# Patient Record
Sex: FEMALE | Race: WHITE | Employment: UNEMPLOYED | URBAN - METROPOLITAN AREA
[De-identification: names, ages, dates, MRNs, and addresses within clinical notes are randomized per-mention and may not be internally consistent; named-entity substitution may affect disease eponyms.]

---

## 2018-03-01 LAB
ALBUMIN SERPL-MCNC: NORMAL G/DL
ALP BLD-CCNC: NORMAL U/L
ALT SERPL-CCNC: NORMAL U/L
ANION GAP SERPL CALCULATED.3IONS-SCNC: NORMAL MMOL/L
AST SERPL-CCNC: NORMAL U/L
AVERAGE GLUCOSE: NORMAL
BILIRUB SERPL-MCNC: NORMAL MG/DL
BUN BLDV-MCNC: NORMAL MG/DL
CALCIUM SERPL-MCNC: NORMAL MG/DL
CHLORIDE BLD-SCNC: NORMAL MMOL/L
CHOLESTEROL, TOTAL: NORMAL
CHOLESTEROL/HDL RATIO: NORMAL
CO2: NORMAL
CREAT SERPL-MCNC: NORMAL MG/DL
GFR CALCULATED: NORMAL
GLUCOSE BLD-MCNC: NORMAL MG/DL
HBA1C MFR BLD: 5 %
HDLC SERPL-MCNC: NORMAL MG/DL
LDL CHOLESTEROL CALCULATED: NORMAL
POTASSIUM SERPL-SCNC: NORMAL MMOL/L
SODIUM BLD-SCNC: NORMAL MMOL/L
TOTAL PROTEIN: NORMAL
TRIGL SERPL-MCNC: NORMAL MG/DL
VLDLC SERPL CALC-MCNC: NORMAL MG/DL

## 2018-08-04 ENCOUNTER — HOSPITAL ENCOUNTER (EMERGENCY)
Age: 58
Discharge: HOME | End: 2018-08-04
Payer: COMMERCIAL

## 2018-08-04 VITALS
TEMPERATURE: 98 F | HEART RATE: 100 BPM | HEIGHT: 67 IN | WEIGHT: 140 LBS | OXYGEN SATURATION: 95 % | SYSTOLIC BLOOD PRESSURE: 182 MMHG | RESPIRATION RATE: 16 BRPM | DIASTOLIC BLOOD PRESSURE: 94 MMHG | BODY MASS INDEX: 21.97 KG/M2

## 2018-08-04 DIAGNOSIS — K13.70 ORAL MUCOSAL LESION: ICD-10-CM

## 2018-08-04 DIAGNOSIS — R21 RASH: Primary | ICD-10-CM

## 2018-08-04 PROCEDURE — 99282 EMERGENCY DEPT VISIT SF MDM: CPT

## 2018-08-04 PROCEDURE — 6370000000 HC RX 637 (ALT 250 FOR IP): Performed by: PHYSICIAN ASSISTANT

## 2018-08-04 RX ORDER — PERMETHRIN 50 MG/G
CREAM TOPICAL
Qty: 60 G | Refills: 1 | Status: SHIPPED | OUTPATIENT
Start: 2018-08-04 | End: 2021-09-03

## 2018-08-04 RX ORDER — LORATADINE 10 MG/1
10 TABLET ORAL DAILY
Qty: 10 TABLET | Refills: 0 | Status: SHIPPED | OUTPATIENT
Start: 2018-08-04 | End: 2018-08-14

## 2018-08-04 RX ORDER — PREDNISONE 10 MG/1
40 TABLET ORAL DAILY
Qty: 16 TABLET | Refills: 0 | Status: SHIPPED | OUTPATIENT
Start: 2018-08-05 | End: 2018-08-09

## 2018-08-04 RX ORDER — PREDNISONE 20 MG/1
60 TABLET ORAL ONCE
Status: COMPLETED | OUTPATIENT
Start: 2018-08-04 | End: 2018-08-04

## 2018-08-04 RX ORDER — DIPHENHYDRAMINE HCL 25 MG
25 TABLET ORAL ONCE
Status: COMPLETED | OUTPATIENT
Start: 2018-08-04 | End: 2018-08-04

## 2018-08-04 RX ADMIN — PREDNISONE 60 MG: 20 TABLET ORAL at 11:30

## 2018-08-04 RX ADMIN — DIPHENHYDRAMINE HCL 25 MG: 25 TABLET ORAL at 11:30

## 2018-08-04 NOTE — ED NOTES
Pt verbalizes understanding of discharge instructions and states she will follow up with her pcp-dr eduardo.      Adán Lopez RN  08/04/18 8036

## 2018-08-04 NOTE — ED PROVIDER NOTES
[facility]     Department of Emergency Medicine   ED  Provider Note  Admit Date/RoomTime: 8/4/2018 10:47 AM  ED Room: 10/10  Chief Complaint   Rash (abdomen, back and upper thighs onset a couple weeks ago and worse over past few days, dog recently diagnosed with mange-sarcoptic)    History of Present Illness   Source of history provided by:  patient. History/Exam Limitations: none. Reginaldo Amaya is a 59 y.o. old female with has a past medical history of:   Past Medical History:   Diagnosis Date    Anxiety     Depression     ETOH abuse     vodka    Hypertension     presents to the emergency department by private vehicle, for complaint of still present red and itchy area on  Back, abdomen, and legs which began several week(s) prior to arrival. States her dog was just diagnosed with scabies. Pt notes she lives at home alone and so there are no other people with similar symptoms. She denies any recent travel. The symptoms were caused by unknown cause. Since onset the symptoms have been persistent. Prior history of similar episodes: No.   Her symptoms are associated with rash and relieved by nothing. She denies any fever, chill, pain at rash site, change in laundry detergent or body wash. ROS    Pertinent positives and negatives are stated within HPI, all other systems reviewed and are negative. Past Surgical History:   Procedure Laterality Date    WRIST SURGERY     Social History:  reports that she has quit smoking. She has never used smokeless tobacco. She reports that she drinks alcohol. She reports that she does not use drugs. Family History: family history is not on file. Allergies: Patient has no known allergies.     Physical Exam           ED Triage Vitals   BP Temp Temp src Pulse Resp SpO2 Height Weight   08/04/18 1050 08/04/18 1050 -- 08/04/18 1050 08/04/18 1050 08/04/18 1050 08/04/18 1103 08/04/18 1103   (!) 182/94 98 °F (36.7 °C)  100 16 95 % 5' 7\" (1.702 m) 140 lb (63.5 kg)

## 2019-05-15 ENCOUNTER — TELEPHONE (OUTPATIENT)
Dept: PRIMARY CARE CLINIC | Age: 59
End: 2019-05-15

## 2019-05-15 RX ORDER — OMEPRAZOLE 40 MG/1
40 CAPSULE, DELAYED RELEASE ORAL DAILY
Qty: 30 CAPSULE | Refills: 0 | Status: SHIPPED | OUTPATIENT
Start: 2019-05-15 | End: 2019-07-17 | Stop reason: SDUPTHER

## 2019-05-17 RX ORDER — LISINOPRIL AND HYDROCHLOROTHIAZIDE 20; 12.5 MG/1; MG/1
1 TABLET ORAL DAILY
Qty: 30 TABLET | Refills: 0 | Status: SHIPPED | OUTPATIENT
Start: 2019-05-17 | End: 2019-07-17 | Stop reason: SDUPTHER

## 2019-07-17 RX ORDER — LISINOPRIL AND HYDROCHLOROTHIAZIDE 20; 12.5 MG/1; MG/1
1 TABLET ORAL DAILY
Qty: 30 TABLET | Refills: 1 | Status: SHIPPED | OUTPATIENT
Start: 2019-07-17 | End: 2019-11-19 | Stop reason: SDUPTHER

## 2019-07-17 RX ORDER — OMEPRAZOLE 40 MG/1
40 CAPSULE, DELAYED RELEASE ORAL DAILY
Qty: 30 CAPSULE | Refills: 1 | Status: SHIPPED | OUTPATIENT
Start: 2019-07-17 | End: 2019-11-19 | Stop reason: SDUPTHER

## 2019-11-19 RX ORDER — LISINOPRIL AND HYDROCHLOROTHIAZIDE 20; 12.5 MG/1; MG/1
1 TABLET ORAL DAILY
Qty: 30 TABLET | Refills: 0 | Status: SHIPPED | OUTPATIENT
Start: 2019-11-19 | End: 2020-01-06 | Stop reason: SDUPTHER

## 2019-11-19 RX ORDER — OMEPRAZOLE 40 MG/1
40 CAPSULE, DELAYED RELEASE ORAL DAILY
Qty: 30 CAPSULE | Refills: 0 | Status: SHIPPED
Start: 2019-11-19 | End: 2021-09-03

## 2020-01-06 RX ORDER — LISINOPRIL AND HYDROCHLOROTHIAZIDE 20; 12.5 MG/1; MG/1
1 TABLET ORAL DAILY
Qty: 30 TABLET | Refills: 0 | Status: SHIPPED
Start: 2020-01-06 | End: 2020-02-27 | Stop reason: SDUPTHER

## 2020-01-21 RX ORDER — TRAMADOL HYDROCHLORIDE 50 MG/1
TABLET ORAL
COMMUNITY
End: 2021-09-03

## 2020-01-21 RX ORDER — ESCITALOPRAM OXALATE 5 MG/1
TABLET ORAL
COMMUNITY
Start: 2018-03-01 | End: 2021-09-03

## 2020-02-27 RX ORDER — LISINOPRIL AND HYDROCHLOROTHIAZIDE 20; 12.5 MG/1; MG/1
1 TABLET ORAL DAILY
Qty: 30 TABLET | Refills: 1 | Status: SHIPPED
Start: 2020-02-27 | End: 2020-05-15 | Stop reason: SDUPTHER

## 2020-05-15 RX ORDER — LISINOPRIL AND HYDROCHLOROTHIAZIDE 20; 12.5 MG/1; MG/1
1 TABLET ORAL DAILY
Qty: 30 TABLET | Refills: 0 | Status: SHIPPED
Start: 2020-05-15 | End: 2020-06-30 | Stop reason: SDUPTHER

## 2020-05-15 RX ORDER — LISINOPRIL AND HYDROCHLOROTHIAZIDE 20; 12.5 MG/1; MG/1
1 TABLET ORAL DAILY
Qty: 30 TABLET | Refills: 1 | OUTPATIENT
Start: 2020-05-15

## 2020-06-26 RX ORDER — LISINOPRIL AND HYDROCHLOROTHIAZIDE 20; 12.5 MG/1; MG/1
1 TABLET ORAL DAILY
Qty: 30 TABLET | Refills: 0 | OUTPATIENT
Start: 2020-06-26

## 2020-06-30 RX ORDER — LISINOPRIL AND HYDROCHLOROTHIAZIDE 20; 12.5 MG/1; MG/1
1 TABLET ORAL DAILY
Qty: 30 TABLET | Refills: 5 | Status: SHIPPED
Start: 2020-06-30 | End: 2021-02-25 | Stop reason: SDUPTHER

## 2020-06-30 NOTE — TELEPHONE ENCOUNTER
Last Appointment:  Visit date not found  Future Appointments   Date Time Provider Irma Anne   7/2/2020  9:15 AM Coty Amaya  [address]

## 2021-02-25 RX ORDER — LISINOPRIL AND HYDROCHLOROTHIAZIDE 20; 12.5 MG/1; MG/1
1 TABLET ORAL DAILY
Qty: 30 TABLET | Refills: 1 | Status: SHIPPED
Start: 2021-02-25 | End: 2021-06-01 | Stop reason: SDUPTHER

## 2021-02-25 NOTE — TELEPHONE ENCOUNTER
Last Appointment:  5/26/2020  Future Appointments   Date Time Provider Irma Anne   4/9/2021  3:30 PM MD SAIRA Bosch [facility]      Pharmacy is requesting a refill. I called and scheduled a f/u for April in [place]. Stressed the importance of keeping the appt.

## 2021-06-01 RX ORDER — LISINOPRIL AND HYDROCHLOROTHIAZIDE 20; 12.5 MG/1; MG/1
1 TABLET ORAL DAILY
Qty: 30 TABLET | Refills: 1 | Status: SHIPPED
Start: 2021-06-01 | End: 2021-09-01 | Stop reason: SDUPTHER

## 2021-06-01 NOTE — TELEPHONE ENCOUNTER
El [facility] is requesting a refill on Lisinopril Hctz. Pt has not been seen in over two years. I called her in Feb and stressed the importance of keeping her April appt. However, she has cancelled her April appt and 2 appts that she made in May. I called and spoke w/ Tanvi Crow, she said that she relies on others for transportation and it is difficult for her to get a ride. I explained that we will send in a 2 month supply but if she fails to be seen before that supply runs out we will not be able to send more refills until she comes into the office.      Last Appointment:  Visit date not found  Future Appointments   Date Time Provider Irma Anne   7/16/2021  2:30 PM Ilsa Landau, MD [facility]

## 2021-09-01 RX ORDER — LISINOPRIL AND HYDROCHLOROTHIAZIDE 20; 12.5 MG/1; MG/1
1 TABLET ORAL DAILY
Qty: 30 TABLET | Refills: 1 | Status: SHIPPED
Start: 2021-09-01 | End: 2021-09-03 | Stop reason: SDUPTHER

## 2021-09-03 ENCOUNTER — OFFICE VISIT (OUTPATIENT)
Dept: PRIMARY CARE CLINIC | Age: 61
End: 2021-09-03
Payer: MEDICARE

## 2021-09-03 VITALS
SYSTOLIC BLOOD PRESSURE: 122 MMHG | BODY MASS INDEX: 21.56 KG/M2 | OXYGEN SATURATION: 96 % | HEART RATE: 108 BPM | DIASTOLIC BLOOD PRESSURE: 70 MMHG | TEMPERATURE: 97.3 F | WEIGHT: 137.4 LBS | HEIGHT: 67 IN

## 2021-09-03 DIAGNOSIS — K21.9 GASTROESOPHAGEAL REFLUX DISEASE WITHOUT ESOPHAGITIS: ICD-10-CM

## 2021-09-03 DIAGNOSIS — Z12.11 SCREENING FOR MALIGNANT NEOPLASM OF COLON: ICD-10-CM

## 2021-09-03 DIAGNOSIS — N87.9 CERVICAL DYSPLASIA: ICD-10-CM

## 2021-09-03 DIAGNOSIS — Z82.49 FAMILY HX OF ISCHEM HEART DIS AND OTH DIS OF THE CIRC SYS: ICD-10-CM

## 2021-09-03 DIAGNOSIS — R53.83 OTHER FATIGUE: ICD-10-CM

## 2021-09-03 DIAGNOSIS — F10.11 H/O ALCOHOL ABUSE: ICD-10-CM

## 2021-09-03 DIAGNOSIS — I10 ESSENTIAL HYPERTENSION: Primary | ICD-10-CM

## 2021-09-03 DIAGNOSIS — R73.9 HYPERGLYCEMIA: ICD-10-CM

## 2021-09-03 DIAGNOSIS — Z12.31 ENCOUNTER FOR SCREENING MAMMOGRAM FOR MALIGNANT NEOPLASM OF BREAST: ICD-10-CM

## 2021-09-03 DIAGNOSIS — F41.1 GENERALIZED ANXIETY DISORDER: ICD-10-CM

## 2021-09-03 PROCEDURE — G8420 CALC BMI NORM PARAMETERS: HCPCS | Performed by: INTERNAL MEDICINE

## 2021-09-03 PROCEDURE — 99214 OFFICE O/P EST MOD 30 MIN: CPT | Performed by: INTERNAL MEDICINE

## 2021-09-03 PROCEDURE — 3017F COLORECTAL CA SCREEN DOC REV: CPT | Performed by: INTERNAL MEDICINE

## 2021-09-03 PROCEDURE — G8427 DOCREV CUR MEDS BY ELIG CLIN: HCPCS | Performed by: INTERNAL MEDICINE

## 2021-09-03 PROCEDURE — 4004F PT TOBACCO SCREEN RCVD TLK: CPT | Performed by: INTERNAL MEDICINE

## 2021-09-03 RX ORDER — ESCITALOPRAM OXALATE 10 MG/1
10 TABLET ORAL DAILY
Qty: 30 TABLET | Refills: 5 | Status: SHIPPED
Start: 2021-09-03 | End: 2022-03-22 | Stop reason: SDUPTHER

## 2021-09-03 RX ORDER — LISINOPRIL AND HYDROCHLOROTHIAZIDE 20; 12.5 MG/1; MG/1
1 TABLET ORAL DAILY
Qty: 30 TABLET | Refills: 5 | Status: SHIPPED
Start: 2021-09-03 | End: 2022-03-24 | Stop reason: SDUPTHER

## 2021-09-03 SDOH — ECONOMIC STABILITY: FOOD INSECURITY: WITHIN THE PAST 12 MONTHS, THE FOOD YOU BOUGHT JUST DIDN'T LAST AND YOU DIDN'T HAVE MONEY TO GET MORE.: NEVER TRUE

## 2021-09-03 SDOH — ECONOMIC STABILITY: FOOD INSECURITY: WITHIN THE PAST 12 MONTHS, YOU WORRIED THAT YOUR FOOD WOULD RUN OUT BEFORE YOU GOT MONEY TO BUY MORE.: NEVER TRUE

## 2021-09-03 ASSESSMENT — ENCOUNTER SYMPTOMS
NAUSEA: 0
RHINORRHEA: 0
SORE THROAT: 0
VOMITING: 0
CHEST TIGHTNESS: 0
COUGH: 0
ABDOMINAL PAIN: 0
BLOOD IN STOOL: 0
CONSTIPATION: 0
EYE PAIN: 0
DIARRHEA: 0
SHORTNESS OF BREATH: 0

## 2021-09-03 ASSESSMENT — PATIENT HEALTH QUESTIONNAIRE - PHQ9
SUM OF ALL RESPONSES TO PHQ QUESTIONS 1-9: 0
2. FEELING DOWN, DEPRESSED OR HOPELESS: 0
1. LITTLE INTEREST OR PLEASURE IN DOING THINGS: 0
SUM OF ALL RESPONSES TO PHQ9 QUESTIONS 1 & 2: 0
SUM OF ALL RESPONSES TO PHQ QUESTIONS 1-9: 0
SUM OF ALL RESPONSES TO PHQ QUESTIONS 1-9: 0

## 2021-09-03 ASSESSMENT — SOCIAL DETERMINANTS OF HEALTH (SDOH): HOW HARD IS IT FOR YOU TO PAY FOR THE VERY BASICS LIKE FOOD, HOUSING, MEDICAL CARE, AND HEATING?: NOT HARD AT ALL

## 2021-09-03 NOTE — PROGRESS NOTES
[facility]) Physicians   Internal Medicine     9/3/2021  Mauricio Amaya : 1960 Sex: female  Age:59 y.o. Chief Complaint   Patient presents with    Medication Refill     re-establish        HPI:   Patient presents to office for evaluation of the following medical concerns. -  has anxiety and Depression. States has been worse recently. States having panic attacks. States was treated with fluoxetine, duloxetine and lexapro in the past. Discussed psychology and psychiatry, declines at present. No suicidal thoughts.      - States has Hypertension. Not checking blood pressure at home. On lisinopri/hctz and metoprolol. No reporte side effects.     - Gastroesophageal Reflux Disease (GERD). States tried to watch diet. Off PPI. States treating with Maalox prn.     - States has h/o alcohol abuse. States has been to recovery in the past. States drinking 1-2 glasses of wine per night. Advised to stop alcohol.     - h/o Cervical Dysplasia    Health Maintenance   - immunizations:   Influenza Vaccination - declines  Pneumonia Vaccination  Zoster/Shingles Vaccine  Tetanus Vaccination  covid - (3/6/2021) #1, (4/3/2021) #2 - moderna    - Screenings:   Bone Density Scan   Pelvic/Pap Exam  Mammogram     Colonoscopy - () - hemorrhoids, sessile polyp - repeat 5yrs    ROS:  Review of Systems   Constitutional: Negative for appetite change, chills, fever and unexpected weight change. HENT: Negative for congestion, rhinorrhea and sore throat. Eyes: Negative for pain and visual disturbance. Respiratory: Negative for cough, chest tightness and shortness of breath. Cardiovascular: Negative for chest pain and palpitations. Gastrointestinal: Negative for abdominal pain, blood in stool, constipation, diarrhea, nausea and vomiting. Genitourinary: Negative for difficulty urinating, dysuria, frequency, pelvic pain, urgency and vaginal bleeding. Musculoskeletal: Negative for arthralgias and myalgias.    Skin: Negative for rash. Neurological: Negative for dizziness, syncope, weakness, light-headedness, numbness and headaches. Hematological: Negative for adenopathy. Psychiatric/Behavioral: Negative for suicidal ideas. The patient is nervous/anxious. Current Outpatient Medications:     escitalopram (LEXAPRO) 10 MG tablet, Take 1 tablet by mouth daily, Disp: 30 tablet, Rfl: 5    lisinopril-hydroCHLOROthiazide (ZESTORETIC) 20-12.5 MG per tablet, Take 1 tablet by mouth daily, Disp: 30 tablet, Rfl: 5    metoprolol tartrate (LOPRESSOR) 25 MG tablet, Take 1 tablet by mouth 2 times daily, Disp: 60 tablet, Rfl: 5    No Known Allergies    Past Medical History:   Diagnosis Date    Anxiety     Cervical dysplasia     Depression     ETOH abuse     vodka    GERD (gastroesophageal reflux disease)     Hypertension        Past Surgical History:   Procedure Laterality Date    BREAST CYST EXCISION      COLONOSCOPY  10/17/2014    repeat 5 years [place] - hemorrhoids sessile polyp    WRIST SURGERY         Family History   Problem Relation Age of Onset    Arthritis Mother     Cancer Father         colon?     High Blood Pressure Father     Hypertension Brother        Social History     Socioeconomic History    Marital status:      Spouse name: None    Number of children: None    Years of education: None    Highest education level: None   Occupational History    None   Tobacco Use    Smoking status: Former Smoker    Smokeless tobacco: Never Used   Substance and Sexual Activity    Alcohol use: Not Currently     Comment: In recovery Since May - went to [facility]    Drug use: No    Sexual activity: None   Other Topics Concern    None   Social History Narrative    None     Social Determinants of Health     Financial Resource Strain: Low Risk     Difficulty of Paying Living Expenses: Not hard at all   Food Insecurity: No Food Insecurity    Worried About [address] in the Last Year: Never true    Jessica of Food in the Last Year: Never true   Transportation Needs:     Lack of Transportation (Medical):  Lack of Transportation (Non-Medical):    Physical Activity:     Days of Exercise per Week:     Minutes of Exercise per Session:    Stress:     Feeling of Stress :    Social Connections:     Frequency of Communication with Friends and Family:     Frequency of Social Gatherings with Friends and Family:     Attends Voodoo Services:     Active Member of Clubs or Organizations:     Attends Club or Organization Meetings:     Marital Status:    Intimate Partner Violence:     Fear of Current or Ex-Partner:     Emotionally Abused:     Physically Abused:     Sexually Abused:         Vitals:    09/03/21 1530   BP: 122/70   Site: Right Upper Arm   Position: Sitting   Pulse: 108   Temp: 97.3 °F (36.3 °C)   TempSrc: Temporal   SpO2: 96%   Weight: 137 lb 6.4 oz (62.3 kg)   Height: 5' 7\" (1.702 m)       Exam:  Physical Exam  Vitals reviewed. Constitutional:       Appearance: She is well-developed. HENT:      Head: Normocephalic and atraumatic. Right Ear: External ear normal.      Left Ear: External ear normal.   Eyes:      Pupils: Pupils are equal, round, and reactive to light. Neck:      Thyroid: No thyromegaly. Cardiovascular:      Rate and Rhythm: Normal rate and regular rhythm. Heart sounds: Normal heart sounds. No murmur heard. Pulmonary:      Effort: Pulmonary effort is normal.      Breath sounds: Normal breath sounds. No wheezing. Abdominal:      General: Bowel sounds are normal. There is no distension. Palpations: Abdomen is soft. Tenderness: There is no abdominal tenderness. There is no guarding or rebound. Musculoskeletal:         General: Normal range of motion. Cervical back: Normal range of motion and neck supple. Lymphadenopathy:      Cervical: No cervical adenopathy. Skin:     General: Skin is warm and dry.    Neurological:      Mental Status: She is alert and oriented to person, place, and time. Cranial Nerves: No cranial nerve deficit. Psychiatric:         Judgment: Judgment normal.         Assessment and Plan:    Diagnoses and all orders for this visit:    Essential hypertension  - watch diet   - monitor blood pressure at home   - on lisinopril/hctz   - on metrololol   - stable today (9/2021)     Generalized anxiety disorder  - was previously treated with duloxetine, fluoxetine and lexapro  - off meds at present   - worsening symptms   - no suiicdal thoughst   - declines psychology and psychiatry   - restart lexapro     Gastroesophageal reflux disease without esophagitis  - watch doet   - off mediations   - on prn maalox   - refer to GI     H/O alcohol abuse  - was previously [facility]ate [facility]   - advised to abstain from alcohol     Cervical dysplasia  - advise gyn eval     Screening for malignant neoplasm of colon  - refer for updated colonoscopy     Encounter for screening mammogram for malignant neoplasm of breast  - order mammogram        Return in about 3 months (around 12/3/2021) for check up and review.     Orders Placed This Encounter   Procedures    [facility]AN DIGITAL SCREEN BILATERAL     PER [address] PROTOCOL     Standing Status:   Future     Standing Expiration Date:   11/3/2022     Order Specific Question:   Reason for exam:     Answer:   screening mammogram    Comprehensive Metabolic Panel     Standing Status:   Future     Standing Expiration Date:   9/3/2022    Magnesium     Standing Status:   Future     Standing Expiration Date:   9/3/2022    Lipid, Fasting     Standing Status:   Future     Standing Expiration Date:   9/3/2022    Hemoglobin A1C     Standing Status:   Future     Standing Expiration Date:   9/3/2022    TSH without Reflex     Standing Status:   Future     Standing Expiration Date:   9/3/2022    Urinalysis     Standing Status:   Future     Standing Expiration Date:   9/3/2022    CBC Auto Differential     Standing Status:   Future     Standing Expiration Date:   9/3/2022   Jocelin Rankin MD, Gastroenterology, [place] [facility]     Referral Priority:   Routine     Referral Type:   Eval and Treat     Referral Reason:   Specialty Services Required     Referred to Provider:   Kathleen Paula MD     Requested Specialty:   Gastroenterology     Number of Visits Requested:   1       Requested Prescriptions     Signed Prescriptions Disp Refills    escitalopram (LEXAPRO) 10 MG tablet 30 tablet 5     Sig: Take 1 tablet by mouth daily    lisinopril-hydroCHLOROthiazide (ZESTORETIC) 20-12.5 MG per tablet 30 tablet 5     Sig: Take 1 tablet by mouth daily    metoprolol tartrate (LOPRESSOR) 25 MG tablet 60 tablet 5     Sig: Take 1 tablet by mouth 2 times daily        Sara Ahumada, MD  9/3/2021  4:20 PM

## 2021-09-08 ENCOUNTER — TELEPHONE (OUTPATIENT)
Dept: GASTROENTEROLOGY | Age: 61
End: 2021-09-08

## 2021-09-08 NOTE — TELEPHONE ENCOUNTER
Called and spoke with the patient on the phone. Scheduled her on 10/13/21 at [address] [place] [address] office. Bring ID, medication list, insurance card and co-pay. Gave the directions to the office. The patient verbalized understanding.   Electronically signed by Martin Bob on 9/8/2021 at 11:17 AM

## 2021-10-14 ENCOUNTER — TELEPHONE (OUTPATIENT)
Dept: GASTROENTEROLOGY | Age: 61
End: 2021-10-14

## 2021-10-14 NOTE — LETTER
[address] [address]  Phone: [phone]  Fax: [phone]    Arelis Watters MD        October 14, 2021     Niharika Amaya  [address]      Dear Justin Puri:    We are sorry that you missed your appointment with Dr. Ruben Perdue on 10/13/2021. Your health and follow-up medical care are important to us. Please call our office as soon as possible so that we may reschedule your appointment. If you have already rescheduled your appointment, please disregard this letter.     Sincerely,        An Terry CMA

## 2021-10-14 NOTE — TELEPHONE ENCOUNTER
Tried to reach patient regarding missed appt on 10/13/2021. LVM for patient to call back at their earliest convenience to be rescheduled. No show letter sent.   Electronically signed by Aldean Ahumada, MA on 10/14/2021 at 1:12 PM

## 2021-10-19 ENCOUNTER — HOSPITAL ENCOUNTER (OUTPATIENT)
Dept: MAMMOGRAPHY | Age: 61
Discharge: HOME | End: 2021-10-21
Payer: MEDICARE

## 2021-10-19 DIAGNOSIS — Z12.31 ENCOUNTER FOR SCREENING MAMMOGRAM FOR MALIGNANT NEOPLASM OF BREAST: ICD-10-CM

## 2022-01-25 ENCOUNTER — TELEPHONE (OUTPATIENT)
Dept: FAMILY MEDICINE CLINIC | Age: 62
End: 2022-01-25

## 2022-01-25 DIAGNOSIS — S42.212S: Primary | ICD-10-CM

## 2022-01-25 NOTE — TELEPHONE ENCOUNTER
Orders Placed This Encounter   Procedures    Sumanth Storey MD, Orthopaedics, [place] ([facility])     Referral Priority:   Routine     Referral Type:   Eval and Treat     Referral Reason:   Specialty Services Required     Referred to Provider:   Tutu Wright MD     Requested Specialty:   Orthopedic Surgery     Number of Visits Requested:   1     Referral placed

## 2022-01-31 DIAGNOSIS — S42.216S: Primary | ICD-10-CM

## 2022-01-31 RX ORDER — HYDROCODONE BITARTRATE AND ACETAMINOPHEN 5; 325 MG/1; MG/1
TABLET ORAL
COMMUNITY
Start: 2022-01-21 | End: 2022-01-31 | Stop reason: SDUPTHER

## 2022-01-31 RX ORDER — HYDROCODONE BITARTRATE AND ACETAMINOPHEN 5; 325 MG/1; MG/1
1 TABLET ORAL DAILY PRN
Qty: 8 TABLET | Refills: 0 | Status: SHIPPED
Start: 2022-01-31 | End: 2022-02-07 | Stop reason: SDUPTHER

## 2022-01-31 NOTE — TELEPHONE ENCOUNTER
Pt is taking 1 tab a day. She has not heard from Dr Lidia Pittman office yet. Phone number was provided so she can call to schedule an appt.

## 2022-01-31 NOTE — TELEPHONE ENCOUNTER
Please ask how often patient is taking medication    We will need to discuss treatment going forward

## 2022-02-07 ENCOUNTER — OFFICE VISIT (OUTPATIENT)
Dept: FAMILY MEDICINE CLINIC | Age: 62
End: 2022-02-07

## 2022-02-07 VITALS
HEIGHT: 67 IN | HEART RATE: 82 BPM | DIASTOLIC BLOOD PRESSURE: 70 MMHG | OXYGEN SATURATION: 96 % | BODY MASS INDEX: 23.07 KG/M2 | SYSTOLIC BLOOD PRESSURE: 110 MMHG | WEIGHT: 147 LBS | TEMPERATURE: 98.6 F

## 2022-02-07 DIAGNOSIS — R73.01 IMPAIRED FASTING GLUCOSE: ICD-10-CM

## 2022-02-07 DIAGNOSIS — F10.10 ALCOHOL ABUSE: ICD-10-CM

## 2022-02-07 DIAGNOSIS — R41.3 MEMORY DEFICITS: ICD-10-CM

## 2022-02-07 DIAGNOSIS — I10 ESSENTIAL HYPERTENSION: ICD-10-CM

## 2022-02-07 DIAGNOSIS — S42.292G CLOSED FRACTURE OF HEAD OF LEFT HUMERUS WITH DELAYED HEALING, SUBSEQUENT ENCOUNTER: Primary | ICD-10-CM

## 2022-02-07 DIAGNOSIS — Z79.899 LONG TERM CURRENT USE OF THERAPEUTIC DRUG: ICD-10-CM

## 2022-02-07 DIAGNOSIS — Z82.49 FAMILY HX OF ISCHEM HEART DIS AND OTH DIS OF THE CIRC SYS: ICD-10-CM

## 2022-02-07 DIAGNOSIS — R53.83 OTHER FATIGUE: ICD-10-CM

## 2022-02-07 DIAGNOSIS — K21.9 GASTROESOPHAGEAL REFLUX DISEASE WITHOUT ESOPHAGITIS: ICD-10-CM

## 2022-02-07 DIAGNOSIS — F41.1 GENERALIZED ANXIETY DISORDER: ICD-10-CM

## 2022-02-07 LAB
ANISOCYTOSIS: ABNORMAL
BASOPHILS ABSOLUTE: 0.02 E9/L (ref 0–0.2)
BASOPHILS RELATIVE PERCENT: 0.3 % (ref 0–2)
BILIRUBIN URINE: ABNORMAL
BLOOD, URINE: NEGATIVE
CLARITY: CLEAR
COLOR: YELLOW
EOSINOPHILS ABSOLUTE: 0.04 E9/L (ref 0.05–0.5)
EOSINOPHILS RELATIVE PERCENT: 0.6 % (ref 0–6)
FOLATE: 8.2 NG/ML (ref 4.8–24.2)
GLUCOSE URINE: NEGATIVE MG/DL
HBA1C MFR BLD: 4.6 % (ref 4–5.6)
HCT VFR BLD CALC: 37.6 % (ref 34–48)
HEMOGLOBIN: 12.9 G/DL (ref 11.5–15.5)
IMMATURE GRANULOCYTES #: 0.03 E9/L
IMMATURE GRANULOCYTES %: 0.5 % (ref 0–5)
KETONES, URINE: NEGATIVE MG/DL
LEUKOCYTE ESTERASE, URINE: NEGATIVE
LYMPHOCYTES ABSOLUTE: 0.84 E9/L (ref 1.5–4)
LYMPHOCYTES RELATIVE PERCENT: 13.4 % (ref 20–42)
MCH RBC QN AUTO: 37.8 PG (ref 26–35)
MCHC RBC AUTO-ENTMCNC: 34.3 % (ref 32–34.5)
MCV RBC AUTO: 110.3 FL (ref 80–99.9)
MONOCYTES ABSOLUTE: 0.54 E9/L (ref 0.1–0.95)
MONOCYTES RELATIVE PERCENT: 8.6 % (ref 2–12)
NEUTROPHILS ABSOLUTE: 4.82 E9/L (ref 1.8–7.3)
NEUTROPHILS RELATIVE PERCENT: 76.6 % (ref 43–80)
NITRITE, URINE: NEGATIVE
OVALOCYTES: ABNORMAL
PDW BLD-RTO: 14.6 FL (ref 11.5–15)
PH UA: 6 (ref 5–9)
PLATELET # BLD: 354 E9/L (ref 130–450)
PMV BLD AUTO: 9.1 FL (ref 7–12)
POIKILOCYTES: ABNORMAL
PROTEIN UA: NEGATIVE MG/DL
RBC # BLD: 3.41 E12/L (ref 3.5–5.5)
SPECIFIC GRAVITY UA: 1.02 (ref 1–1.03)
UROBILINOGEN, URINE: 0.2 E.U./DL
VITAMIN B-12: 180 PG/ML (ref 211–946)
WBC # BLD: 6.3 E9/L (ref 4.5–11.5)

## 2022-02-07 PROCEDURE — 99214 OFFICE O/P EST MOD 30 MIN: CPT | Performed by: INTERNAL MEDICINE

## 2022-02-07 RX ORDER — HYDROCODONE BITARTRATE AND ACETAMINOPHEN 5; 325 MG/1; MG/1
1 TABLET ORAL DAILY PRN
Qty: 30 TABLET | Refills: 0 | Status: SHIPPED | OUTPATIENT
Start: 2022-02-07 | End: 2022-03-09

## 2022-02-07 RX ORDER — LANOLIN ALCOHOL/MO/W.PET/CERES
100 CREAM (GRAM) TOPICAL DAILY
Qty: 30 TABLET | Refills: 3 | Status: SHIPPED | OUTPATIENT
Start: 2022-02-07 | End: 2022-03-24 | Stop reason: SDUPTHER

## 2022-02-07 ASSESSMENT — ENCOUNTER SYMPTOMS
BLOOD IN STOOL: 0
NAUSEA: 0
SHORTNESS OF BREATH: 0
VOMITING: 0
SORE THROAT: 0
COUGH: 0
DIARRHEA: 0
ABDOMINAL PAIN: 0
CONSTIPATION: 0
RHINORRHEA: 0
CHEST TIGHTNESS: 0
EYE PAIN: 0

## 2022-02-07 NOTE — PROGRESS NOTES
[facility]) Physicians   Internal Medicine     2022  Machelle Amaya : 1960 Sex: female  Age:56 y.o. Chief Complaint   Patient presents with    Fall     Neck/Shoulder injury when she fell  and went to [facility] and was told that she has a displaced left humeralhead Fx        HPI:   Patient presents to office for evaluation of the following medical concerns. - States was ER () - left displaced humeral neck fracture. States slipped on ice. States was given referral in urgent care, did not schedule. Out office provided referral to closer location - di not schedule. Has been asking for pain medication. Patient arrived without her sling    -  has anxiety and Depression. States has been worse recently. States having panic attacks. States was treated with fluoxetine, duloxetine and lexapro in the past. Discussed psychology and psychiatry, declines at present. No suicidal thoughts.      - Patient complains of having issues with memory. Discussed testing and referral and other work-up with potential MRI. Concern with alcohol use. Declined all testing and referrals    - States has Hypertension. Not checking blood pressure at home. On lisinopri/hctz and metoprolol. No reporte side effects.     - Gastroesophageal Reflux Disease (GERD). States tried to watch diet. Off PPI. States treating with Maalox prn.     - States has h/o alcohol abuse. States has been to recovery in the past. States drinking 1-2 glasses of wine per night. Advised to stop alcohol. Discussed with the patient at length referral to psychiatry and substance abuse.   Patient declined adamantly    - h/o Cervical Dysplasia    Health Maintenance   - immunizations:   Influenza Vaccination - declines  Pneumonia Vaccination  Zoster/Shingles Vaccine  Tetanus Vaccination  covid - (3/6/2021) #1, (4/3/2021) #2 - moderna    - Screenings:   Bone Density Scan   Pelvic/Pap Exam  Mammogram     Colonoscopy - () - hemorrhoids, sessile polyp - repeat 5yrs    ROS:  Review of Systems   Constitutional: Negative for appetite change, chills, fever and unexpected weight change. HENT: Negative for congestion, rhinorrhea and sore throat. Eyes: Negative for pain and visual disturbance. Respiratory: Negative for cough, chest tightness and shortness of breath. Cardiovascular: Negative for chest pain and palpitations. Gastrointestinal: Negative for abdominal pain, blood in stool, constipation, diarrhea, nausea and vomiting. Genitourinary: Negative for difficulty urinating, dysuria, frequency, pelvic pain, urgency and vaginal bleeding. Musculoskeletal: Negative for arthralgias and myalgias. Skin: Negative for rash. Neurological: Negative for dizziness, syncope, weakness, light-headedness, numbness and headaches. Hematological: Negative for adenopathy. Psychiatric/Behavioral: Negative for suicidal ideas. The patient is nervous/anxious. Current Outpatient Medications:     HYDROcodone-acetaminophen (NORCO) 5-325 MG per tablet, Take 1 tablet by mouth daily as needed for Pain for up to 30 days. , Disp: 30 tablet, Rfl: 0    thiamine 100 MG tablet, Take 1 tablet by mouth daily, Disp: 30 tablet, Rfl: 3    escitalopram (LEXAPRO) 10 MG tablet, Take 1 tablet by mouth daily, Disp: 30 tablet, Rfl: 5    lisinopril-hydroCHLOROthiazide (ZESTORETIC) 20-12.5 MG per tablet, Take 1 tablet by mouth daily, Disp: 30 tablet, Rfl: 5    metoprolol tartrate (LOPRESSOR) 25 MG tablet, Take 1 tablet by mouth 2 times daily, Disp: 60 tablet, Rfl: 5    No Known Allergies    Past Medical History:   Diagnosis Date    Anxiety     Cervical dysplasia     Depression     ETOH abuse     vodka    GERD (gastroesophageal reflux disease)     Hypertension        Past Surgical History:   Procedure Laterality Date    BREAST CYST EXCISION      COLONOSCOPY  10/17/2014    repeat 5 years [place] - hemorrhoids sessile polyp    WRIST SURGERY Family History   Problem Relation Age of Onset    Arthritis Mother     Cancer Father         colon?  High Blood Pressure Father     Hypertension Brother        Social History     Socioeconomic History    Marital status:      Spouse name: None    Number of children: None    Years of education: None    Highest education level: None   Occupational History    None   Tobacco Use    Smoking status: Former Smoker     Packs/day: 0.50     Years: 8.00     Pack years: 4.00     Quit date:      Years since quittin.1    Smokeless tobacco: Never Used    Tobacco comment: Smoked for 8-10 years in her 25s. Smoked less than 1/2 pk a day. Substance and Sexual Activity    Alcohol use: Not Currently     Comment: In recovery Since May - went to [facility]    Drug use: No    Sexual activity: None   Other Topics Concern    None   Social History Narrative    None     Social Determinants of Health     Financial Resource Strain: Low Risk     Difficulty of Paying Living Expenses: Not hard at all   Food Insecurity: No Food Insecurity    Worried About Running Out of Food in the Last Year: Never true    Jessica of Food in the Last Year: Never true   Transportation Needs:     Lack of Transportation (Medical): Not on file    Lack of Transportation (Non-Medical):  Not on file   Physical Activity:     Days of Exercise per Week: Not on file    Minutes of Exercise per Session: Not on file   Stress:     Feeling of Stress : Not on file   Social Connections:     Frequency of Communication with Friends and Family: Not on file    Frequency of Social Gatherings with Friends and Family: Not on file    Attends Mormon Services: Not on file    Active Member of Clubs or Organizations: Not on file    Attends Club or Organization Meetings: Not on file    Marital Status: Not on file   Intimate Partner Violence:     Fear of Current or Ex-Partner: Not on file    Emotionally Abused: Not on file   Lin new referral   - did discuss with NP and spoke to ortho - to stay in sling at all times  - home exercises -Ortho suggesting pendulum exercises  - needs follow up in 1 month for further recommendations with ortho   - states has been taking ibuprofen   - will order norco - advised against use with alcohol     Controlled Substance Monitoring:    Acute and Chronic Pain Monitoring:   RX Monitoring 2/7/2022   Periodic Controlled Substance Monitoring Possible medication side effects, risk of tolerance/dependence & alternative treatments discussed. ;No signs of potential drug abuse or diversion identified.;Obtaining appropriate analgesic effect of treatment. Essential hypertension  - watch diet   - monitor blood pressure at home   - on lisinopril/hctz   - on metrololol   - stable today (9/2021)     Generalized anxiety disorder  - was previously treated with duloxetine, fluoxetine and lexapro  - off meds at present   - worsening symptms   - no suicidal thoughst   - declines psychology and psychiatry   - restart lexapro - self stopped - did not feel was benefiting     Memory deficits  - possible due to alcohol   - check b vitamins   - declines work up or referral at present given shoulder issues (2/2022)   -Check [place] at next office visit    Gastroesophageal reflux disease without esophagitis  - watch doet   - off mediations   - on prn maalox   - refer to GI     Alcohol abuse  - was previously treated in recovery center   - advised to abstain from alcohol   - declines referral     Cervical dysplasia  - advise gyn eval     Screening for malignant neoplasm of colon  - refer for updated colonoscopy     Encounter for screening mammogram for malignant neoplasm of breast  - order mammogram        Return in about 1 month (around 3/7/2022) for check up and review.     Orders Placed This Encounter   Procedures    Comprehensive Metabolic Panel     Standing Status:   Future     Standing Expiration Date:   2/7/2023    Magnesium Standing Status:   Future     Standing Expiration Date:   2/7/2023    Lipid, Fasting     Standing Status:   Future     Standing Expiration Date:   2/7/2023    Hemoglobin A1C     Standing Status:   Future     Standing Expiration Date:   2/7/2023    TSH without Reflex     Standing Status:   Future     Standing Expiration Date:   2/7/2023    Urinalysis     Standing Status:   Future     Standing Expiration Date:   2/7/2023    CBC Auto Differential     Standing Status:   Future     Standing Expiration Date:   2/7/2023    Vitamin B12 & Folate     Standing Status:   Future     Standing Expiration Date:   2/7/2023    VITAMIN B1     Standing Status:   Future     Standing Expiration Date:   2/7/2023    Eugene Youngblood MD, Orthopaedics and Rehabilitation, [place]     Referral Priority:   Routine     Referral Type:   Eval and Treat     Referral Reason:   Specialty Services Required     Referred to Provider:   Georgiana Babinski, MD     Requested Specialty:   Orthopedic Surgery     Number of Visits Requested:   1       Requested Prescriptions     Signed Prescriptions Disp Refills    HYDROcodone-acetaminophen (NORCO) 5-325 MG per tablet 30 tablet 0     Sig: Take 1 tablet by mouth daily as needed for Pain for up to 30 days.     thiamine 100 MG tablet 30 tablet 3     Sig: Take 1 tablet by mouth daily        María Johnston MD  2/7/2022  2:47 PM

## 2022-02-08 ENCOUNTER — TELEPHONE (OUTPATIENT)
Dept: FAMILY MEDICINE CLINIC | Age: 62
End: 2022-02-08

## 2022-02-08 LAB
ALBUMIN SERPL-MCNC: 4.3 G/DL (ref 3.5–5.2)
ALP BLD-CCNC: 103 U/L (ref 35–104)
ALT SERPL-CCNC: 20 U/L (ref 0–32)
ANION GAP SERPL CALCULATED.3IONS-SCNC: 18 MMOL/L (ref 7–16)
AST SERPL-CCNC: 56 U/L (ref 0–31)
BILIRUB SERPL-MCNC: 2 MG/DL (ref 0–1.2)
BUN BLDV-MCNC: 12 MG/DL (ref 6–23)
CALCIUM SERPL-MCNC: 9.6 MG/DL (ref 8.6–10.2)
CHLORIDE BLD-SCNC: 94 MMOL/L (ref 98–107)
CHOLESTEROL, FASTING: 275 MG/DL (ref 0–199)
CO2: 24 MMOL/L (ref 22–29)
CREAT SERPL-MCNC: 0.6 MG/DL (ref 0.5–1)
GFR AFRICAN AMERICAN: >60
GFR NON-AFRICAN AMERICAN: >60 ML/MIN/1.73
GLUCOSE BLD-MCNC: 95 MG/DL (ref 74–99)
HDLC SERPL-MCNC: 83 MG/DL
LDL CHOLESTEROL CALCULATED: 163 MG/DL (ref 0–99)
MAGNESIUM: 1.3 MG/DL (ref 1.6–2.6)
POTASSIUM SERPL-SCNC: 3.1 MMOL/L (ref 3.5–5)
SODIUM BLD-SCNC: 136 MMOL/L (ref 132–146)
TOTAL PROTEIN: 7.3 G/DL (ref 6.4–8.3)
TRIGLYCERIDE, FASTING: 146 MG/DL (ref 0–149)
TSH SERPL DL<=0.05 MIU/L-ACNC: 0.33 UIU/ML (ref 0.27–4.2)
VLDLC SERPL CALC-MCNC: 29 MG/DL

## 2022-02-08 NOTE — TELEPHONE ENCOUNTER
Please let the patient know that blood work results showed    Vitamin B 1 level was pending at the time of this note    Vitamin B12 level was low and would recommend vitamin B12 1000 mcg supplement, would also have to consider monthly injections    Folic acid level was normal but borderline low I would recommend at least a multivitamin daily    Magnesium level was low, this could be related to blood pressure medicine hydrochlorothiazide and may need to adjust this medication and would recommend magnesium oxide 400 mg twice a day    Potassium level was low. This also may be related to hydrochlorothiazide medication and may need to adjust blood pressure medications would recommend potassium 10 mEq once a day if continue and recheck of electrolytes    Chloride level was also mildly low    Bilirubin, which is a byproduct of the liver was slightly elevated and would recommend ultrasound of the liver    1 liver function test was also mildly elevated and could be related to alcohol    Blood counts were normal however red cells were enlarged which could be related to both alcohol and vitamin B12    Cholesterol levels were very elevated, HDL or good cholesterol was elevated and beneficial range.   The 10-year ASCVD risk score (Mohan Escalante, et al., 2013) is: 4.4%, which is considered lower risk for heart vascular complications in the next 10 years however levels were significantly elevated and may need to consider medications specifically statins    Hemoglobin A1c is a measure 3-month sugar control was normal no evidence for prediabetes or diabetes    Urine analysis showed bilirubin otherwise appeared normal    Thyroid levels were normal    Other electrolytes and kidney function were normal    Thanks

## 2022-02-14 LAB — VITAMIN B1 WHOLE BLOOD: 125 NMOL/L (ref 70–180)

## 2022-02-18 ENCOUNTER — OFFICE VISIT (OUTPATIENT)
Dept: ORTHOPEDIC SURGERY | Age: 62
End: 2022-02-18
Payer: COMMERCIAL

## 2022-02-18 VITALS — WEIGHT: 145 LBS | BODY MASS INDEX: 22.76 KG/M2 | HEIGHT: 67 IN

## 2022-02-18 DIAGNOSIS — S42.202A CLOSED FRACTURE OF PROXIMAL END OF LEFT HUMERUS, UNSPECIFIED FRACTURE MORPHOLOGY, INITIAL ENCOUNTER: Primary | ICD-10-CM

## 2022-02-18 PROCEDURE — 99204 OFFICE O/P NEW MOD 45 MIN: CPT | Performed by: ORTHOPAEDIC SURGERY

## 2022-02-18 RX ORDER — HYDROCODONE BITARTRATE AND ACETAMINOPHEN 5; 325 MG/1; MG/1
1 TABLET ORAL EVERY 6 HOURS PRN
Qty: 28 TABLET | Refills: 0 | Status: SHIPPED
Start: 2022-02-18 | End: 2022-03-24 | Stop reason: SDUPTHER

## 2022-02-18 NOTE — PROGRESS NOTES
New Shoulder Patient Visit     Referring Provider:   MD Arthur Meier,  [id number]    CHIEF COMPLAINT:   Chief Complaint   Patient presents with    Shoulder Pain     DOI 1/16/2021 went to get checked on 1/21/2021: slipped on ice landing straight onto the shoulder , patient went to [facility] facility for treatment, placed her in a sling     Results     brought disc        HPI:      Selina Amaya is a 59y.o. year old female who is seen today  for evaluation of left shoulder pain. Patient reports onset of symptoms 1 month ago after she slipped on ice shoveling her driveway. She was initially seen at [address] [place] [address] urgent care in [facility] and was told after having x-rays taken that she had a broken shoulder. She was recommended to follow-up with an orthopedic provider at that time. Patient states however that after a few weeks she reached out to her primary care provider who placed a referral to us. She reports that she has been wearing the sling most of the time she follows up today 1 month out from injury. She reports pain at night. Denies feelings of instability. Denies previous treatments. She is not currently working. PAST MEDICAL HISTORY  Past Medical History:   Diagnosis Date    Anxiety     Cervical dysplasia     Depression     ETOH abuse     vodka    GERD (gastroesophageal reflux disease)     Hypertension        PAST SURGICAL HISTORY  Past Surgical History:   Procedure Laterality Date    BREAST CYST EXCISION      COLONOSCOPY  10/17/2014    repeat 5 years [place] - hemorrhoids sessile polyp    WRIST SURGERY         FAMILY HISTORY   Family History   Problem Relation Age of Onset    Arthritis Mother     Cancer Father         colon?     High Blood Pressure Father     Hypertension Brother        SOCIAL HISTORY  Social History     Occupational History    Not on file   Tobacco Use    Smoking status: Former Smoker     Packs/day: 0.50     Years: 8.00 Pack years: 4.00     Quit date: 46     Years since quittin.1    Smokeless tobacco: Never Used    Tobacco comment: Smoked for 8-10 years in her 25s. Smoked less than 1/2 pk a day. 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 , Disp: 30 tablet, Rfl: 0    thiamine 100 MG tablet, Take 1 tablet by mouth daily, Disp: 30 tablet, Rfl: 3    lisinopril-hydroCHLOROthiazide (ZESTORETIC) 20-12.5 MG per tablet, Take 1 tablet by mouth daily, Disp: 30 tablet, Rfl: 5    metoprolol tartrate (LOPRESSOR) 25 MG tablet, Take 1 tablet by mouth 2 times daily, Disp: 60 tablet, Rfl: 5    escitalopram (LEXAPRO) 10 MG tablet, Take 1 tablet by mouth daily (Patient not taking: Reported on 2022), Disp: 30 tablet, Rfl: 5    ALLERGIES  No Known Allergies    Controlled Substances Monitoring:        REVIEW OF SYSTEMS:     Constitutional:  Negative for weight loss, fevers, chills, fatigue  Cardiovascular: Negative for chest pain, palpitations  Pulmonary: Negative for shortness of breath, labored breathing, cough  GI: negative for abdominal pain, nausea, vomitting   MSK: per HPI  Skin: negative for rash, open wounds    All other systems reviewed and are negative           PHYSICAL EXAM     Vitals:    22 0943   Weight: 145 lb (65.8 kg)   Height: 5' 7\" (1.702 m)       Height: 5' 7\" (1.702 m)  Weight: [unfilled]  BMI:  Body mass index is 22.71 kg/m². General: The patient is alert and oriented x 3, appears to be stated age and in no distress. HEENT: head is normocephalic, atraumatic. EOMI. Neck: supple, trachea midline, no thyromegaly   Cardiovascular: peripheral pulses palpable.   Normal Capillary refill Respiratory: breathing unlabored, chest expansion symmetric   Skin: no rash, no open wounds, no erythema  Psych: normal affect; mood stable  Neurologic: gait normal, sensation grossly intact in extremities  MSK:    Cervical Spine: There is no tenderness to palpation along the cervical spine. Range of motion is normal.  Spurling's is negative    Shoulder Exam:   Left shoulder exam patient's arm remained at side during exam today. There is no obvious deformity or swelling on inspection. Mild generalized tenderness with palpation. No pain in the hand wrist or elbow. Neurovascular sensation grossly intact. IMAGING:     Previous x-rays from [facility] were reviewed on physical disc that showed minimally displaced left proximal humerus fracture. X-ray including two views of the left shoulder obtained today shows worsening alignment with posterior shift of the humeral head off of the humeral neck. There is evidence of new bone callus formation present. Radiographic findings reviewed with patient    ASSESSMENT   Displaced comminuted left proximal humerus fracture x1 month      PLAN  Today we discussed her left shoulder. Patient is 1 month out from injury resulting in a minimally displaced fracture of her left proximal humerus. New imaging obtained today showed loss of alignment with humeral head falling off posterior to the humeral neck. There is evidence of new callus formation present. We discussed continuing with nonoperative management at this time. However did discuss potential need down the road for surgical intervention likely in the form of a reverse total shoulder arthroplasty. She is to remain nonweightbearing avoiding lifting pushing or pulling to the left upper extremity. She can wear sling for comfort purposes. Recommended obtaining a CT scan to further evaluate extent of fracture. Patient was agreeable with plan of care. She will follow-up after CT scan is obtained.       Rodrigo Sauer NANCY Solorio  Orthopedic Surgery   02/18/22  12:21 PM    Staff Addendum    I have seen and evaluated the patient and agree with the assessment and plan as documented by Bill Jenkins CNP. I have performed the key components of the history and physical examination and concur with the findings and plan, and have made changes where appropriate/necessary. Agree with above. She is a month out from her injury. Already noted to have callus on x-ray, significant comminution and displacement with apex anterior position of the shaft. She can continue the sling for no 2 weeks coming out to do passive motion exercises. I would like to attain a CT scan to better assess. We discussed that if she fails conservative treatment, arthroplasty would be the best surgical option.   Follow-up after CT scan to review      Estefani Wild MD  [address] [place] [address]

## 2022-02-22 RX ORDER — MAGNESIUM OXIDE 400 MG/1
400 TABLET ORAL DAILY
Qty: 30 TABLET | Refills: 5 | Status: SHIPPED | OUTPATIENT
Start: 2022-02-22

## 2022-02-22 RX ORDER — POTASSIUM CHLORIDE 750 MG/1
10 TABLET, EXTENDED RELEASE ORAL DAILY
Qty: 30 TABLET | Refills: 5 | Status: SHIPPED | OUTPATIENT
Start: 2022-02-22

## 2022-02-22 RX ORDER — ATORVASTATIN CALCIUM 20 MG/1
20 TABLET, FILM COATED ORAL DAILY
Qty: 30 TABLET | Refills: 5 | Status: SHIPPED
Start: 2022-02-22 | End: 2022-03-24 | Stop reason: SDUPTHER

## 2022-02-22 NOTE — TELEPHONE ENCOUNTER
Requested Prescriptions     Signed Prescriptions Disp Refills    potassium chloride (KLOR-CON M) 10 MEQ extended release tablet 30 tablet 5     Sig: Take 1 tablet by mouth daily     Authorizing Provider: Geovany MICHELLE    atorvastatin (LIPITOR) 20 MG tablet 30 tablet 5     Sig: Take 1 tablet by mouth daily     Authorizing Provider: Geovany MICHELLE    magnesium oxide (MAG-OX) 400 MG tablet 30 tablet 5     Sig: Take 1 tablet by mouth daily     Authorizing Provider: Cindy Lee     Medications sent

## 2022-03-10 ENCOUNTER — HOSPITAL ENCOUNTER (OUTPATIENT)
Dept: CT IMAGING | Age: 62
Discharge: HOME | End: 2022-03-12
Payer: COMMERCIAL

## 2022-03-10 DIAGNOSIS — S42.202A CLOSED FRACTURE OF PROXIMAL END OF LEFT HUMERUS, UNSPECIFIED FRACTURE MORPHOLOGY, INITIAL ENCOUNTER: ICD-10-CM

## 2022-03-10 PROCEDURE — 76376 3D RENDER W/INTRP POSTPROCES: CPT

## 2022-03-10 PROCEDURE — 73200 CT UPPER EXTREMITY W/O DYE: CPT

## 2022-03-11 ENCOUNTER — TELEPHONE (OUTPATIENT)
Dept: ORTHOPEDIC SURGERY | Age: 62
End: 2022-03-11

## 2022-03-11 NOTE — TELEPHONE ENCOUNTER
I called Renita Milesbryant to give her her CT scan results, which show comminuted fracture of the humerus. I would like to see her in the office the week of 3/21 to go over findings and determine treatment moving forward. I left this on a voicemail as she was not available.   I instructed her to call the office to schedule follow-up    Susanne Guevara MD  [address] [place] [address]

## 2022-03-11 NOTE — RESULT ENCOUNTER NOTE
I called and left a voicemail to discuss results with Tri Angulo. I would like to see her in the office week of 3/21 to go over findings and determine treatment moving forward.   She should remain in the sling for the time being

## 2022-03-21 DIAGNOSIS — S42.202A CLOSED FRACTURE OF PROXIMAL END OF LEFT HUMERUS, UNSPECIFIED FRACTURE MORPHOLOGY, INITIAL ENCOUNTER: ICD-10-CM

## 2022-03-21 DIAGNOSIS — F41.1 GENERALIZED ANXIETY DISORDER: ICD-10-CM

## 2022-03-21 RX ORDER — ESCITALOPRAM OXALATE 10 MG/1
10 TABLET ORAL DAILY
Qty: 30 TABLET | Refills: 5 | OUTPATIENT
Start: 2022-03-21

## 2022-03-21 RX ORDER — HYDROCODONE BITARTRATE AND ACETAMINOPHEN 5; 325 MG/1; MG/1
1 TABLET ORAL EVERY 6 HOURS PRN
Qty: 28 TABLET | Refills: 0 | OUTPATIENT
Start: 2022-03-21 | End: 2022-03-28

## 2022-03-21 NOTE — TELEPHONE ENCOUNTER
LM for patient to return call.  Will need to schedule appointment for [place] refill- will also need to send back to [facility] for refill on Lexapro when patient calls back to schedule follow up

## 2022-03-21 NOTE — TELEPHONE ENCOUNTER
I am okay with refilling the Lexapro, I have not done so as of this note    However I am not okay with refilling Norco over the phone without any appointments and especially with history of alcohol use

## 2022-03-22 RX ORDER — ESCITALOPRAM OXALATE 10 MG/1
10 TABLET ORAL DAILY
Qty: 30 TABLET | Refills: 5 | Status: SHIPPED | OUTPATIENT
Start: 2022-03-22

## 2022-03-22 NOTE — TELEPHONE ENCOUNTER
Scheduled for Thursday for pain med refill. Will queue the Lexapro.  Pt aware the appointment is in [place]

## 2022-03-24 ENCOUNTER — OFFICE VISIT (OUTPATIENT)
Dept: FAMILY MEDICINE CLINIC | Age: 62
End: 2022-03-24
Payer: COMMERCIAL

## 2022-03-24 VITALS
WEIGHT: 139 LBS | TEMPERATURE: 98 F | HEART RATE: 83 BPM | DIASTOLIC BLOOD PRESSURE: 80 MMHG | SYSTOLIC BLOOD PRESSURE: 120 MMHG | OXYGEN SATURATION: 95 % | BODY MASS INDEX: 21.77 KG/M2

## 2022-03-24 DIAGNOSIS — R17 ELEVATED BILIRUBIN: ICD-10-CM

## 2022-03-24 DIAGNOSIS — F41.1 GENERALIZED ANXIETY DISORDER: ICD-10-CM

## 2022-03-24 DIAGNOSIS — R41.3 MEMORY DEFICITS: ICD-10-CM

## 2022-03-24 DIAGNOSIS — R73.01 IMPAIRED FASTING GLUCOSE: ICD-10-CM

## 2022-03-24 DIAGNOSIS — S42.202A CLOSED FRACTURE OF PROXIMAL END OF LEFT HUMERUS, UNSPECIFIED FRACTURE MORPHOLOGY, INITIAL ENCOUNTER: ICD-10-CM

## 2022-03-24 DIAGNOSIS — E87.6 HYPOKALEMIA: ICD-10-CM

## 2022-03-24 DIAGNOSIS — F10.10 ALCOHOL ABUSE: ICD-10-CM

## 2022-03-24 DIAGNOSIS — S42.292G CLOSED FRACTURE OF HEAD OF LEFT HUMERUS WITH DELAYED HEALING, SUBSEQUENT ENCOUNTER: Primary | ICD-10-CM

## 2022-03-24 DIAGNOSIS — I10 ESSENTIAL HYPERTENSION: ICD-10-CM

## 2022-03-24 DIAGNOSIS — K21.9 GASTROESOPHAGEAL REFLUX DISEASE WITHOUT ESOPHAGITIS: ICD-10-CM

## 2022-03-24 DIAGNOSIS — S42.292G CLOSED FRACTURE OF HEAD OF LEFT HUMERUS WITH DELAYED HEALING, SUBSEQUENT ENCOUNTER: ICD-10-CM

## 2022-03-24 DIAGNOSIS — E83.42 HYPOMAGNESEMIA: ICD-10-CM

## 2022-03-24 DIAGNOSIS — E53.8 VITAMIN B12 DEFICIENCY: ICD-10-CM

## 2022-03-24 PROCEDURE — 96372 THER/PROPH/DIAG INJ SC/IM: CPT | Performed by: INTERNAL MEDICINE

## 2022-03-24 PROCEDURE — 99214 OFFICE O/P EST MOD 30 MIN: CPT | Performed by: INTERNAL MEDICINE

## 2022-03-24 RX ORDER — HYDROCODONE BITARTRATE AND ACETAMINOPHEN 5; 325 MG/1; MG/1
1 TABLET ORAL DAILY PRN
Qty: 30 TABLET | Refills: 0 | OUTPATIENT
Start: 2022-03-24 | End: 2022-04-23

## 2022-03-24 RX ORDER — CYANOCOBALAMIN 1000 UG/ML
1000 INJECTION INTRAMUSCULAR; SUBCUTANEOUS ONCE
Status: COMPLETED | OUTPATIENT
Start: 2022-03-24 | End: 2022-03-24

## 2022-03-24 RX ORDER — LANOLIN ALCOHOL/MO/W.PET/CERES
100 CREAM (GRAM) TOPICAL DAILY
Qty: 30 TABLET | Refills: 5 | Status: SHIPPED | OUTPATIENT
Start: 2022-03-24

## 2022-03-24 RX ORDER — HYDROCODONE BITARTRATE AND ACETAMINOPHEN 5; 325 MG/1; MG/1
1 TABLET ORAL DAILY PRN
Qty: 7 TABLET | Refills: 0 | Status: SHIPPED | OUTPATIENT
Start: 2022-03-24 | End: 2022-03-31

## 2022-03-24 RX ORDER — LISINOPRIL AND HYDROCHLOROTHIAZIDE 20; 12.5 MG/1; MG/1
1 TABLET ORAL DAILY
Qty: 30 TABLET | Refills: 5 | Status: SHIPPED
Start: 2022-03-24 | End: 2022-09-30 | Stop reason: SDUPTHER

## 2022-03-24 RX ORDER — ATORVASTATIN CALCIUM 20 MG/1
20 TABLET, FILM COATED ORAL DAILY
Qty: 30 TABLET | Refills: 5 | Status: SHIPPED | OUTPATIENT
Start: 2022-03-24

## 2022-03-24 RX ADMIN — CYANOCOBALAMIN 1000 MCG: 1000 INJECTION INTRAMUSCULAR; SUBCUTANEOUS at 12:10

## 2022-03-24 ASSESSMENT — ENCOUNTER SYMPTOMS
SHORTNESS OF BREATH: 0
DIARRHEA: 0
BLOOD IN STOOL: 0
NAUSEA: 0
CHEST TIGHTNESS: 0
SORE THROAT: 0
VOMITING: 0
EYE PAIN: 0
COUGH: 0
CONSTIPATION: 0
RHINORRHEA: 0
ABDOMINAL PAIN: 0

## 2022-03-24 NOTE — PROGRESS NOTES
[facility]) Physicians   Internal Medicine     3/24/2022  Ross Dose Monique : 1960 Sex: female  Age:56 y.o. Chief Complaint   Patient presents with    Pain    Discuss Labs    Anxiety    Hypertension        HPI:   Patient presents to office for evaluation of the following medical concerns. - States left displaced humeral neck fracture. States slipped on ice.  was given referral in urgent care, did not schedule. Ouroffice provided referral to closer location - did not schedule. Has been asking for pain medication. Patient arrived again without her sling.  does wear sling at home most of the time. States pain in manageable. Has seen ortho. Approaching non operative at present. To see in 1 week for follow up. Asking for refill of pain medication. [place] reviewed showed 2 fills in February by me and ortho. - labs showed slight hypokalemia. Started potassium supplement. Also showed low magnesium. Started on magnesium. Possible related to hctz and alcohol.     - labs showed elevated bilirubin. Has chronic elevated lft - h/o alcohol abuse     - lasb showed hyperlipidemia. The 10-year ASCVD risk score (Cindy Davila., et al., 2013) is: 5.2% (3/2022)     - States has vitamin b12 def. On otc vitamin b12     - States has anxiety and Depression. States has been worse recently. States having panic attacks. States was treated with fluoxetine, duloxetine and lexapro in the past. Discussed psychology and psychiatry, declines at present. No suicidal thoughts.      - Patient complains of having issues with memory. Discussed testing and referral and other work-up with potential MRI. Concern with alcohol use. Declined all testing and referrals    - States has Hypertension. Not checking blood pressure at home. On lisinopri/hctz and metoprolol. No reporte side effects.     - Gastroesophageal Reflux Disease (GERD). States tried to watch diet. Off PPI.  States treating with Maalox prn.     - States has h/o alcohol abuse. States has been to recovery in the past. States drinking 1-2 glasses of wine per night. Advised to stop alcohol. Discussed with the patient at length referral to psychiatry and substance abuse. Patient declined adamantly    - h/o Cervical Dysplasia    - labs from 2/7/2022 reviewed with patient 3/24/2022    Health Maintenance   - immunizations:   Influenza Vaccination - declines  Pneumonia Vaccination  Zoster/Shingles Vaccine  Tetanus Vaccination  covid - (3/6/2021) #1, (4/3/2021) #2 - moderna    - Screenings:   Bone Density Scan   Pelvic/Pap Exam  Mammogram     Colonoscopy - (2014) - hemorrhoids, sessile polyp - repeat 5yrs    ROS:  Review of Systems   Constitutional: Negative for appetite change, chills, fever and unexpected weight change. HENT: Negative for congestion, rhinorrhea and sore throat. Eyes: Negative for pain and visual disturbance. Respiratory: Negative for cough, chest tightness and shortness of breath. Cardiovascular: Negative for chest pain and palpitations. Gastrointestinal: Negative for abdominal pain, blood in stool, constipation, diarrhea, nausea and vomiting. Genitourinary: Negative for difficulty urinating, dysuria, frequency, pelvic pain, urgency and vaginal bleeding. Musculoskeletal: Negative for arthralgias and myalgias. Skin: Negative for rash. Neurological: Negative for dizziness, syncope, weakness, light-headedness, numbness and headaches. Hematological: Negative for adenopathy. Psychiatric/Behavioral: Negative for suicidal ideas. The patient is nervous/anxious.           Current Outpatient Medications:     lisinopril-hydroCHLOROthiazide (ZESTORETIC) 20-12.5 MG per tablet, Take 1 tablet by mouth daily, Disp: 30 tablet, Rfl: 5    metoprolol tartrate (LOPRESSOR) 25 MG tablet, Take 1 tablet by mouth 2 times daily, Disp: 60 tablet, Rfl: 5    HYDROcodone-acetaminophen (NORCO) 5-325 MG per tablet, Take 1 tablet by mouth daily as needed for Pain for up Determinants of Health     Financial Resource Strain: Low Risk     Difficulty of Paying Living Expenses: Not hard at all   Food Insecurity: No Food Insecurity    Worried About Running Out of Food in the Last Year: Never true    Jessica of Food in the Last Year: Never true   Transportation Needs:     Lack of Transportation (Medical): Not on file    Lack of Transportation (Non-Medical): Not on file   Physical Activity:     Days of Exercise per Week: Not on file    Minutes of Exercise per Session: Not on file   Stress:     Feeling of Stress : Not on file   Social Connections:     Frequency of Communication with Friends and Family: Not on file    Frequency of Social Gatherings with Friends and Family: Not on file    Attends Anglican Services: Not on file    Active Member of [address] [place] [address] or Organizations: Not on file    Attends Club or Organization Meetings: Not on file    Marital Status: Not on file   Intimate Partner Violence:     Fear of Current or Ex-Partner: Not on file    Emotionally Abused: Not on file    Physically Abused: Not on file    Sexually Abused: Not on file   Housing Stability:     Unable to Pay for Housing in the Last Year: Not on file    Number of [place] in the Last Year: Not on file    Unstable Housing in the Last Year: Not on file        Vitals:    03/24/22 1135   BP: 120/80   Pulse: 83   Temp: 98 °F (36.7 °C)   SpO2: 95%   Weight: 139 lb (63 kg)       Exam:  Physical Exam  Vitals reviewed. Constitutional:       Appearance: She is well-developed. HENT:      Head: Normocephalic and atraumatic. Right Ear: External ear normal.      Left Ear: External ear normal.   Eyes:      Pupils: Pupils are equal, round, and reactive to light. Neck:      Thyroid: No thyromegaly. Cardiovascular:      Rate and Rhythm: Normal rate and regular rhythm. Heart sounds: Normal heart sounds. No murmur heard.       Pulmonary:      Effort: Pulmonary effort is normal.      Breath sounds: Normal breath sounds. No wheezing. Abdominal:      General: Bowel sounds are normal. There is no distension. Palpations: Abdomen is soft. Tenderness: There is no abdominal tenderness. There is no guarding or rebound. Musculoskeletal:         General: Normal range of motion. Left upper arm: Deformity present. Cervical back: Normal range of motion and neck supple. Comments: Swelling to left arm, limited use, was not wearing sling    Lymphadenopathy:      Cervical: No cervical adenopathy. Skin:     General: Skin is warm and dry. Neurological:      Mental Status: She is alert and oriented to person, place, and time. Cranial Nerves: No cranial nerve deficit. Psychiatric:         Judgment: Judgment normal.         Assessment and Plan:    Diagnoses and all orders for this visit:    Closed fracture of head of left humerus with delayed healing, subsequent encounter  - has been noncompliant   - has had 2 referrals to ortho - has not followed through   - will place new referral   - did discuss with NP and spoke to ortho - to stay in sling at all times  - home exercises -Ortho suggesting pendulum exercises  - needs follow up in 1 month for further recommendations with ortho   - states has been taking ibuprofen   - will order norco - advised against use with alcohol     Controlled Substance Monitoring:    Acute and Chronic Pain Monitoring:   RX Monitoring 3/24/2022   Periodic Controlled Substance Monitoring Possible medication side effects, risk of tolerance/dependence & alternative treatments discussed. ;No signs of potential drug abuse or diversion identified.      Essential hypertension  - watch diet   - monitor blood pressure at home   - on lisinopril/hctz   - on metrololol   - stable today 3/24/2022    Hypomagnesemia  - possible multifactorial - alcohol and meds (HCTZ   - started on supplement   - follow labs   - may need to change blood pressure meds if persists     Hypokalemia  - possible multifactorial - alcohol and meds (HCTZ   - started on supplement   - follow labs   - may need to consier changing blood pressure meds if persists     Vitamin B12 deficiency  - uncerain as to cause - poss alcohol and nutrition   - started on oral vitamin b12 otc   - will give IM injection today     Generalized anxiety disorder  - was previously treated with duloxetine, fluoxetine and lexapro  - off meds at present   - worsening symptms   - no suicidal thoughst   - declines psychology and psychiatry   - restart lexapro - self stopped - did not feel was benefiting     Memory deficits  - possible due to alcohol   - check b vitamins - b12 low (2/2022)   - declines work up or referral at present given shoulder issues (2/2022)   -Check [place] or [facility]     Gastroesophageal reflux disease without esophagitis  - watch doet   - off mediations   - on prn maalox   - refer to GI     Alcohol abuse  - was previously treated in recovery center   - advised to abstain from alcohol   - declines referral     Cervical dysplasia  - advise gyn eval     Screening for malignant neoplasm of colon  - refer for updated colonoscopy     Return in about 3 months (around 6/24/2022) for check up and review. Orders Placed This Encounter   Procedures    US LIVER     Standing Status:   Future     Standing Expiration Date:   3/24/2023     Order Specific Question:   Reason for exam:     Answer:   elevated lft and bilirubin, h/o alcohol abuse       Requested Prescriptions     Signed Prescriptions Disp Refills    lisinopril-hydroCHLOROthiazide (ZESTORETIC) 20-12.5 MG per tablet 30 tablet 5     Sig: Take 1 tablet by mouth daily    metoprolol tartrate (LOPRESSOR) 25 MG tablet 60 tablet 5     Sig: Take 1 tablet by mouth 2 times daily    HYDROcodone-acetaminophen (NORCO) 5-325 MG per tablet 7 tablet 0     Sig: Take 1 tablet by mouth daily as needed for Pain for up to 7 days. Intended supply: 7 days.  Take lowest dose possible to manage pain    atorvastatin (LIPITOR) 20 MG tablet 30 tablet 5     Sig: Take 1 tablet by mouth daily    thiamine 100 MG tablet 30 tablet 5     Sig: Take 1 tablet by mouth daily        Promise Clark MD  3/24/2022  2:12 PM

## 2022-04-01 ENCOUNTER — OFFICE VISIT (OUTPATIENT)
Dept: ORTHOPEDIC SURGERY | Age: 62
End: 2022-04-01
Payer: COMMERCIAL

## 2022-04-01 VITALS — BODY MASS INDEX: 21.82 KG/M2 | WEIGHT: 139 LBS | HEIGHT: 67 IN

## 2022-04-01 DIAGNOSIS — S42.202A CLOSED FRACTURE OF PROXIMAL END OF LEFT HUMERUS, UNSPECIFIED FRACTURE MORPHOLOGY, INITIAL ENCOUNTER: Primary | ICD-10-CM

## 2022-04-01 PROCEDURE — 99213 OFFICE O/P EST LOW 20 MIN: CPT | Performed by: ORTHOPAEDIC SURGERY

## 2022-04-01 NOTE — PROGRESS NOTES
Follow Up Visit     Mabel Amaya returns today for follow up visit regarding Displaced comminuted left proximal humerus fracture DOI 1/16/2022. Treatment thus far has included remain nonweightbearing avoiding lifting pushing or pulling to the left upper extremity and can wear sling for comfort purposes. Ct scan was ordered and is here today to review results . She reports symptoms are improving. She is improving in terms of pain. Still some issues with function    Physical Exam:     Height: 5' 7\" (1.702 m), Weight: 139 lb (63 kg)    General: Alert and oriented x3, no acute distress  Cardiovascular/pulmonary: No labored breathing, peripheral perfusion intact  Musculoskeletal:    Exam of the shoulder today shows I can passively elevate her about 90 to 100 degrees with minimal discomfort. She does display some weakness but is able to elevate the arm, does not display pseudoparalysis. Internal and external rotation with the arm at the side do not display any crepitus, shoulder moves as a single unit. Controlled Substances Monitoring:      Imaging:  X-rays obtained today 2 views show no change in alignment with interval callus formation, correlating with findings on CT scan    Impression: Interval healing of proximal humerus fracture      Assessment: Left proximal humerus fracture now about 3 months out treated conservatively      Plan:   Overall she is doing okay. She continues to make improvements in terms of pain and function. We will continue conservative treatment at this time.   She will follow-up in 6 weeks    Sammie Baer MD  Orthopaedic Surgery   4/1/22  8:04 AM

## 2022-04-12 ENCOUNTER — EVALUATION (OUTPATIENT)
Dept: PHYSICAL THERAPY | Age: 62
End: 2022-04-12
Payer: COMMERCIAL

## 2022-04-12 DIAGNOSIS — S42.412D CLOSED SUPRACONDYLAR FRACTURE OF LEFT HUMERUS WITH ROUTINE HEALING, SUBSEQUENT ENCOUNTER: Primary | ICD-10-CM

## 2022-04-12 PROCEDURE — 97110 THERAPEUTIC EXERCISES: CPT | Performed by: PHYSICAL THERAPIST

## 2022-04-12 PROCEDURE — 97161 PT EVAL LOW COMPLEX 20 MIN: CPT | Performed by: PHYSICAL THERAPIST

## 2022-04-12 NOTE — PROGRESS NOTES
[address] [facility]   Phone: [phone]   Fax: [phone]           Date:  2022   Patient: Igor Amaya  : 1960  MRN: [id number]  Referring Provider: Dewayne Sainz MD  [address]  [facility]     Medical Diagnosis:     V25.770X (ICD-10-CM) - Closed fracture of proximal end of left humerus, unspecified fracture morphology, initial encounter      The patient can be scheduled with any member of the group, including the provider with the first available appointments.      Left proximal humerus fracture. Eval/treat. OK for passive, active ROM DOI 2022       SUBJECTIVE:     Onset date: 2022    Onset[de-identified] Sudden onset    Mechanism of Injury / History: The pt fell onto her left side on  & sustained Fx proximal left humerus. Patient is right handed. Previous PT: none    Medical Management for Current Problem: left sh sling. pt referred to outpt PT    Chief complaint: pain, decreased motion, weakness, inability / limited ability to use arm, limited ability to lift/carry/handle material, limited ability to complete home/outdoor chores/tasks    Behavior: condition is staying the same    Pain: pt c/o soreness, stiffness & pain at left sh. Pt reports that left sh pain is \"a little painful\". Symptom Type/Quality: dull, aching, throbbing  Location[de-identified] gross & diffuse left sh pain        Imaging results: XR SHOULDER LEFT (MIN 2 VIEWS)    Result Date: 2022  Radiology exam is complete. No Radiologist dictation. Please follow up with ordering provider.        Past Medical History:  Past Medical History:   Diagnosis Date    Anxiety     Cervical dysplasia     Depression     ETOH abuse     vodka    GERD (gastroesophageal reflux disease)     Hypertension      Past Surgical History:   Procedure Laterality Date    BREAST CYST EXCISION      COLONOSCOPY  10/17/2014    repeat 5 years [place] - hemorrhoids sessile polyp    WRIST SURGERY Medications:   Current Outpatient Medications   Medication Sig Dispense Refill    lisinopril-hydroCHLOROthiazide (ZESTORETIC) 20-12.5 MG per tablet Take 1 tablet by mouth daily 30 tablet 5    metoprolol tartrate (LOPRESSOR) 25 MG tablet Take 1 tablet by mouth 2 times daily 60 tablet 5    atorvastatin (LIPITOR) 20 MG tablet Take 1 tablet by mouth daily 30 tablet 5    thiamine 100 MG tablet Take 1 tablet by mouth daily 30 tablet 5    escitalopram (LEXAPRO) 10 MG tablet Take 1 tablet by mouth daily 30 tablet 5    potassium chloride (KLOR-CON M) 10 MEQ extended release tablet Take 1 tablet by mouth daily 30 tablet 5    magnesium oxide (MAG-OX) 400 MG tablet Take 1 tablet by mouth daily 30 tablet 5     No current facility-administered medications for this visit. Occupation: pt is not employed. Exercise regimen: none    Patient Goals: return to prior activity, full use of arm, get back to normal    Precautions/Contraindications: none    OBJECTIVE:     Observations: well nourished female    Inspection: pt presents with forward/rounded sh. Pt presents with guarding of left sh.                 Palpation: pt presents with no abnormal tenderness     Joint/Motion: left sh hiking with flex & abd    Left Shoulder:  AROM: 70 Forward elevation, 50° abduction,  30° ER,  IR to 35  PROM: 90° Forward elevation, 90° abduction,  40 ER , 45° IR      Strength:    Left Shoulder: 2-/5    Special Tests:  NA    ASSESSMENT     Outcome Measure: QuickDASH (Disorders of the Arm, Shoulder, and Hand) % disability    Problems:    Pain reported 5/10   ROM decreased   Strength decreased   Decreased functional ability with use of left upper extremity, reaching, lifting, carrying    Reason for Skilled Care: The pt presents with limited rom left sh, weakness left sh, postural abnormalities, c/o pain, & functional limitations due to pt's diagnosis.   Therefore, I recommend that the pt requires the skilled services of outpt PT Rehab to achieve LTGs, restore & resolve her deficits & limitations, & facilitate return to prior level of function/activity. [x] There are no barriers affecting plan of care or recovery    [] Barriers to this patient's plan of care or recovery include. Domestic Concerns:  [x] No  [] Yes:    Short Term goals (3 weeks)   Decrease reported pain to 2/10   Increase ROM to 130 flex & abd arom   Increase Strength to 4/5    Able to perform/complete the following functions/tasks: no diffiuclty with light household activities & ADLs   QuickDASH (Disorders of the Arm, Shoulder, and Hand) 30% disability    Long Term goals (6 weeks)   Decrease reported pain to 0/10   Increase ROM to [facility] to WNL arom   Increase Strength to 5/5    Able to perform/complete the following functions/tasks: pt resumes prior level of function/activity, & no difficulty with heavy household chores   QuickDASH 10% disability   Independent with Home Exercise Programs    Rehab Potential: [x] Good  [] Fair  [] Poor    PLAN       Treatment Plan: 2x/wk x 6wks  [x] Therapeutic Exercise  [x] Therapeutic Activity  [x] Neuromuscular Re-education   [] Gait Training  [] Balance Training  [] Aerobic conditioning  [x] Manual Therapy  [] Massage/Fascial release   [] Work/Sport specific activities    [] Pain Neuroscience [x] Cold/hotpack  [x] Vasocompression  [x] Electrical Stimulation  [] Lumbar/Cervical Traction  [x] Ultrasound   [] Iontophoresis: 4 mg/mL Dexamethasone Sodium Phosphate 40-80 mAmin  [x] Dry Needling      [x] Instruction in HEP      []  Medication allergies reviewed for use of Dexamethasone Sodium Phosphate 4mg/ml  with iontophoresis treatments. Patient is not allergic.       The following CPT codes are likely to be used in the care of this patient: 90121 PT Evaluation: Low Complexity   09608 Therapeutic Exercise   90275 Neuromuscular Re-Education   72658 Therapeutic Activities   01881 Manual Therapy   [address] [place] [address] Electrical Stimulation      Suggested Professional Referral: [x] No  [] Yes:     Patient Education:  [x] Plans/Goals, Risks/Benefits discussed  [x] Home exercise program  Method of Education: [x] Verbal  [x] Demo  [x] Written  Comprehension of Education:  [x] Verbalizes understanding. [x] Demonstrates understanding. [] Needs Review. [] Demonstrates/verbalizes understanding of HEP/Ed previously given. Frequency:  2 days per week for 6 weeks    Patient understands diagnosis/prognosis and consents to treatment, plan and goals: [x] Yes    [] No     Thank you for the opportunity to work with your patient. If you have questions or comments, please contact me at numbers listed above. Electronically signed by: Livia Gould, [address] Medicare Patients Only     Please sign Physician's Certification and return to: [address] [place] [address]  Dept: [phone]  Dept Fax: [phone]  Loc: [phone] Certification / Comments     Frequency/Duration 2 days per week for 6 weeks. Certification period from 4/12/2022  to 6/12/2022. I have reviewed the Plan of Care established for skilled therapy services and certify that the services are required and that they will be provided while the patient is under my care.     Physician's Comments/Revisions:               Physician's Printed Name:                                           [de-identified] Signature:                                                               Date:

## 2022-04-12 NOTE — PROGRESS NOTES
[address] [facility]   Phone: [phone]   Fax: [phone]      Physical Therapy Daily Treatment Note    Date: 2022  Patient Name: Lala Amaya  : 1960   MRN: [id number]  DOInjury: 2022  DOSx: NA   Referring Provider: Daniel Darden MD  [address] [facility]     Medical Diagnosis:     L98.598N (ICD-10-CM) - Closed fracture of proximal end of left humerus, unspecified fracture morphology, initial encounter    Left proximal humerus fracture. Eval/treat. OK for passive, active ROM DOI 2022    Outcome Measure: QuickDASH    S: Pt c/o pain, soreness, weakness, & stiffness at left sh.    O: Please refer to PT Eval    Time 0236-5061     Visit  Repeat outcome measure at mid point and end. Pain      Strength      Palpation      ROM      Modalities            Manual                                                                  Prom left sh abd, flex, scaption, ER, & horizontal abd & add 2022  TE   OHP sh flex, abd, & scaption x30 each  TE   PhysioBall BUE roll-out sh flex x30  TE   Towel table slides sh flex, abd, & scaption x30 each  TE   Wall climbs with towel sh flex x30  BUE  Used pole TE   Supine wand sh flex, ER, abd, & scaption x10 each  TE                                                         ROWS: M  \"    ROWS: L  \"    ER  \"    IR  \"                A:  Tolerated well. Above added to written HEP.     P: Continue with rehab plan    Laron Webster, PT [address]    Treatment Charges: Mins Units   Initial Evaluation 17 1   Re-Evaluation     Ther Exercise         TE 23 2   Manual Therapy     MT     Ther Activities        TA     Gait Training          GT     Neuro Re-education NR     Modalities     Non-Billable Service Time     Other     Total Time/Units 40 3

## 2022-05-03 ENCOUNTER — TELEPHONE (OUTPATIENT)
Dept: ORTHOPEDIC SURGERY | Age: 62
End: 2022-05-03

## 2022-05-03 NOTE — TELEPHONE ENCOUNTER
----- Message from Con Abhi sent at 4/29/2022 10:03 AM EDT -----  Regarding: Swelling  Patient phoned complaining of swelling of shoulder 4 days now. Has been taking ibuprofin and icing. Requests a return call. Verified number. Was seen 4/1/22 Closed supracondylar fracture of left humerus.

## 2022-05-11 ENCOUNTER — TREATMENT (OUTPATIENT)
Dept: PHYSICAL THERAPY | Age: 62
End: 2022-05-11

## 2022-08-11 ENCOUNTER — TELEPHONE (OUTPATIENT)
Dept: FAMILY MEDICINE CLINIC | Age: 62
End: 2022-08-11

## 2022-09-01 NOTE — PROGRESS NOTES
[address] [facility]   Phone: [phone]   Fax: [phone]    Discharge Summary        REASON FOR DISCHARGE: Pt elected to self d/c on 4/22/2022    PATIENT EDUCATION/INSTRUCTIONS: continue HEP as instructed    RECOMMENDATIONS: call c questions or if additional services are warranted. Thank you for the opportunity to work with your patient. If you have questions or comments, please contact me at numbers listed above.       Luis F Manzano 89 , DPT PT [id number] 9/1/2022

## 2022-09-30 DIAGNOSIS — I10 ESSENTIAL HYPERTENSION: ICD-10-CM

## 2022-09-30 RX ORDER — LISINOPRIL AND HYDROCHLOROTHIAZIDE 20; 12.5 MG/1; MG/1
1 TABLET ORAL DAILY
Qty: 90 TABLET | Refills: 3 | Status: SHIPPED | OUTPATIENT
Start: 2022-09-30

## 2022-09-30 NOTE — TELEPHONE ENCOUNTER
Last Appointment:  3/24/2022  No future appointments. Patient did not get this filled in time. The script . Please send over.

## 2022-12-08 DIAGNOSIS — F41.1 GENERALIZED ANXIETY DISORDER: ICD-10-CM

## 2022-12-08 RX ORDER — ESCITALOPRAM OXALATE 10 MG/1
10 TABLET ORAL DAILY
Qty: 30 TABLET | Refills: 5 | Status: SHIPPED | OUTPATIENT
Start: 2022-12-08

## 2023-04-18 RX ORDER — ATORVASTATIN CALCIUM 20 MG/1
20 TABLET, FILM COATED ORAL DAILY
Qty: 30 TABLET | Refills: 1 | Status: SHIPPED | OUTPATIENT
Start: 2023-04-18

## 2023-04-18 RX ORDER — POTASSIUM CHLORIDE 750 MG/1
10 TABLET, EXTENDED RELEASE ORAL DAILY
Qty: 30 TABLET | Refills: 1 | Status: SHIPPED | OUTPATIENT
Start: 2023-04-18

## 2023-04-19 DIAGNOSIS — I10 ESSENTIAL HYPERTENSION: ICD-10-CM

## 2023-04-19 NOTE — TELEPHONE ENCOUNTER
Medication appointment however due to to schedule the patient for a follow-up as it has been more than 1 year since her last visit here    Further refills need Follow up

## 2023-05-23 DIAGNOSIS — F41.1 GENERALIZED ANXIETY DISORDER: ICD-10-CM

## 2023-05-23 RX ORDER — ESCITALOPRAM OXALATE 10 MG/1
10 TABLET ORAL DAILY
Qty: 30 TABLET | Refills: 0 | Status: SHIPPED | OUTPATIENT
Start: 2023-05-23

## 2023-05-23 NOTE — TELEPHONE ENCOUNTER
Last Appointment:  3/24/2022  No future appointments. Noted on the rx that patient needs an appt for any further refills. I tried to call her to schedule, but got no answer and her vm is full.

## 2023-06-15 DIAGNOSIS — F41.1 GENERALIZED ANXIETY DISORDER: ICD-10-CM

## 2023-06-15 RX ORDER — ESCITALOPRAM OXALATE 10 MG/1
10 TABLET ORAL DAILY
Qty: 30 TABLET | Refills: 0 | OUTPATIENT
Start: 2023-06-15

## 2023-06-21 DIAGNOSIS — F41.1 GENERALIZED ANXIETY DISORDER: ICD-10-CM

## 2023-06-21 RX ORDER — POTASSIUM CHLORIDE 750 MG/1
10 TABLET, EXTENDED RELEASE ORAL DAILY
Qty: 90 TABLET | Refills: 1 | OUTPATIENT
Start: 2023-06-21

## 2023-06-21 RX ORDER — ESCITALOPRAM OXALATE 10 MG/1
10 TABLET ORAL DAILY
Qty: 90 TABLET | Refills: 1 | OUTPATIENT
Start: 2023-06-21

## 2023-06-28 DIAGNOSIS — F41.1 GENERALIZED ANXIETY DISORDER: ICD-10-CM

## 2023-06-28 RX ORDER — ESCITALOPRAM OXALATE 10 MG/1
10 TABLET ORAL DAILY
Qty: 30 TABLET | Refills: 1 | Status: SHIPPED | OUTPATIENT
Start: 2023-06-28

## 2023-08-22 DIAGNOSIS — F41.1 GENERALIZED ANXIETY DISORDER: ICD-10-CM

## 2023-08-22 RX ORDER — ESCITALOPRAM OXALATE 10 MG/1
10 TABLET ORAL DAILY
Qty: 30 TABLET | Refills: 3 | Status: SHIPPED | OUTPATIENT
Start: 2023-08-22

## 2023-08-22 NOTE — TELEPHONE ENCOUNTER
Last Appointment:  3/24/2022  Future Appointments   Date Time Provider [address]   8/29/2023  8:15 AM Sydna Fabry,  [facility]

## 2023-09-20 ENCOUNTER — OFFICE VISIT (OUTPATIENT)
Dept: PRIMARY CARE CLINIC | Age: 63
End: 2023-09-20

## 2023-09-20 VITALS
DIASTOLIC BLOOD PRESSURE: 86 MMHG | SYSTOLIC BLOOD PRESSURE: 130 MMHG | HEART RATE: 82 BPM | OXYGEN SATURATION: 95 % | HEIGHT: 67 IN | BODY MASS INDEX: 23.07 KG/M2 | WEIGHT: 147 LBS | TEMPERATURE: 98.6 F

## 2023-09-20 DIAGNOSIS — E78.2 MIXED HYPERLIPIDEMIA: ICD-10-CM

## 2023-09-20 DIAGNOSIS — E83.42 HYPOMAGNESEMIA: ICD-10-CM

## 2023-09-20 DIAGNOSIS — F10.11 H/O ALCOHOL ABUSE: ICD-10-CM

## 2023-09-20 DIAGNOSIS — F41.1 GENERALIZED ANXIETY DISORDER: ICD-10-CM

## 2023-09-20 DIAGNOSIS — F10.10 ALCOHOL ABUSE: ICD-10-CM

## 2023-09-20 DIAGNOSIS — R73.9 HYPERGLYCEMIA: ICD-10-CM

## 2023-09-20 DIAGNOSIS — E53.8 VITAMIN B12 DEFICIENCY: ICD-10-CM

## 2023-09-20 DIAGNOSIS — R53.83 OTHER FATIGUE: ICD-10-CM

## 2023-09-20 DIAGNOSIS — E55.9 VITAMIN D DEFICIENCY: ICD-10-CM

## 2023-09-20 DIAGNOSIS — Z12.11 SCREENING FOR MALIGNANT NEOPLASM OF COLON: ICD-10-CM

## 2023-09-20 DIAGNOSIS — Z12.31 ENCOUNTER FOR SCREENING MAMMOGRAM FOR MALIGNANT NEOPLASM OF BREAST: ICD-10-CM

## 2023-09-20 DIAGNOSIS — R41.3 MEMORY CHANGES: ICD-10-CM

## 2023-09-20 DIAGNOSIS — E87.6 HYPOKALEMIA: ICD-10-CM

## 2023-09-20 DIAGNOSIS — K21.9 GASTROESOPHAGEAL REFLUX DISEASE WITHOUT ESOPHAGITIS: ICD-10-CM

## 2023-09-20 DIAGNOSIS — I10 ESSENTIAL HYPERTENSION: Primary | ICD-10-CM

## 2023-09-20 LAB
ABSOLUTE IMMATURE GRANULOCYTE: <0.03 K/UL (ref 0–0.58)
ALBUMIN SERPL-MCNC: 4.8 G/DL (ref 3.5–5.2)
ALP BLD-CCNC: 108 U/L (ref 35–104)
ALT SERPL-CCNC: 41 U/L (ref 0–32)
ANION GAP SERPL CALCULATED.3IONS-SCNC: 21 MMOL/L (ref 7–16)
AST SERPL-CCNC: 112 U/L (ref 0–31)
BASOPHILS ABSOLUTE: 0.04 K/UL (ref 0–0.2)
BASOPHILS RELATIVE PERCENT: 1 % (ref 0–2)
BILIRUB SERPL-MCNC: 3.4 MG/DL (ref 0–1.2)
BUN BLDV-MCNC: 9 MG/DL (ref 6–23)
CALCIUM SERPL-MCNC: 9.7 MG/DL (ref 8.6–10.2)
CHLORIDE BLD-SCNC: 97 MMOL/L (ref 98–107)
CHOLESTEROL, FASTING: 237 MG/DL
CO2: 22 MMOL/L (ref 22–29)
CREAT SERPL-MCNC: 0.6 MG/DL (ref 0.5–1)
CREATININE URINE: 84.8 MG/DL (ref 29–226)
EOSINOPHILS ABSOLUTE: 0.03 K/UL (ref 0.05–0.5)
EOSINOPHILS RELATIVE PERCENT: 1 % (ref 0–6)
FOLATE: 6.5 NG/ML (ref 4.8–24.2)
GFR SERPL CREATININE-BSD FRML MDRD: >60 ML/MIN/1.73M2
GLUCOSE BLD-MCNC: 86 MG/DL (ref 74–99)
HBA1C MFR BLD: 5.3 % (ref 4–5.6)
HCT VFR BLD CALC: 46.9 % (ref 34–48)
HDLC SERPL-MCNC: 115 MG/DL
HEMOGLOBIN: 15.6 G/DL (ref 11.5–15.5)
IMMATURE GRANULOCYTES: 0 % (ref 0–5)
LDL CHOLESTEROL: 108 MG/DL
LYMPHOCYTES ABSOLUTE: 0.9 K/UL (ref 1.5–4)
LYMPHOCYTES RELATIVE PERCENT: 15 % (ref 20–42)
MAGNESIUM: 1.5 MG/DL (ref 1.6–2.6)
MCH RBC QN AUTO: 34.4 PG (ref 26–35)
MCHC RBC AUTO-ENTMCNC: 33.3 G/DL (ref 32–34.5)
MCV RBC AUTO: 103.3 FL (ref 80–99.9)
MICROALBUMIN/CREAT 24H UR: <12 MG/L (ref 0–19)
MICROALBUMIN/CREAT UR-RTO: NORMAL MCG/MG CREAT (ref 0–30)
MONOCYTES ABSOLUTE: 0.35 K/UL (ref 0.1–0.95)
MONOCYTES RELATIVE PERCENT: 6 % (ref 2–12)
NEUTROPHILS ABSOLUTE: 4.65 K/UL (ref 1.8–7.3)
NEUTROPHILS RELATIVE PERCENT: 78 % (ref 43–80)
PDW BLD-RTO: 12.2 % (ref 11.5–15)
PLATELET # BLD: 217 K/UL (ref 130–450)
PMV BLD AUTO: 9.8 FL (ref 7–12)
POTASSIUM SERPL-SCNC: 4 MMOL/L (ref 3.5–5)
RBC # BLD: 4.54 M/UL (ref 3.5–5.5)
SODIUM BLD-SCNC: 140 MMOL/L (ref 132–146)
TOTAL PROTEIN: 7.8 G/DL (ref 6.4–8.3)
TRIGLYCERIDE, FASTING: 72 MG/DL
TSH SERPL DL<=0.05 MIU/L-ACNC: 0.32 UIU/ML (ref 0.27–4.2)
VITAMIN B-12: 311 PG/ML (ref 211–946)
VITAMIN D 25-HYDROXY: 32 NG/ML (ref 30–100)
VLDLC SERPL CALC-MCNC: 14 MG/DL
WBC # BLD: 6 K/UL (ref 4.5–11.5)

## 2023-09-20 PROCEDURE — 90674 CCIIV4 VAC NO PRSV 0.5 ML IM: CPT | Performed by: INTERNAL MEDICINE

## 2023-09-20 PROCEDURE — 3075F SYST BP GE 130 - 139MM HG: CPT | Performed by: INTERNAL MEDICINE

## 2023-09-20 PROCEDURE — 3079F DIAST BP 80-89 MM HG: CPT | Performed by: INTERNAL MEDICINE

## 2023-09-20 PROCEDURE — 90471 IMMUNIZATION ADMIN: CPT | Performed by: INTERNAL MEDICINE

## 2023-09-20 PROCEDURE — 99214 OFFICE O/P EST MOD 30 MIN: CPT | Performed by: INTERNAL MEDICINE

## 2023-09-20 RX ORDER — MAGNESIUM OXIDE 400 MG/1
400 TABLET ORAL DAILY
Qty: 30 TABLET | Refills: 5 | Status: SHIPPED | OUTPATIENT
Start: 2023-09-20

## 2023-09-20 RX ORDER — POTASSIUM CHLORIDE 750 MG/1
10 TABLET, EXTENDED RELEASE ORAL DAILY
Qty: 30 TABLET | Refills: 1 | Status: SHIPPED | OUTPATIENT
Start: 2023-09-20

## 2023-09-20 RX ORDER — LISINOPRIL AND HYDROCHLOROTHIAZIDE 20; 12.5 MG/1; MG/1
1 TABLET ORAL DAILY
Qty: 90 TABLET | Refills: 3 | Status: SHIPPED | OUTPATIENT
Start: 2023-09-20

## 2023-09-20 RX ORDER — LANOLIN ALCOHOL/MO/W.PET/CERES
100 CREAM (GRAM) TOPICAL DAILY
Qty: 30 TABLET | Refills: 5 | Status: SHIPPED | OUTPATIENT
Start: 2023-09-20

## 2023-09-20 RX ORDER — ATORVASTATIN CALCIUM 20 MG/1
20 TABLET, FILM COATED ORAL DAILY
Qty: 30 TABLET | Refills: 1 | Status: SHIPPED | OUTPATIENT
Start: 2023-09-20

## 2023-09-20 RX ORDER — ESCITALOPRAM OXALATE 10 MG/1
10 TABLET ORAL DAILY
Qty: 30 TABLET | Refills: 3 | Status: SHIPPED | OUTPATIENT
Start: 2023-09-20

## 2023-09-20 SDOH — ECONOMIC STABILITY: FOOD INSECURITY: WITHIN THE PAST 12 MONTHS, THE FOOD YOU BOUGHT JUST DIDN'T LAST AND YOU DIDN'T HAVE MONEY TO GET MORE.: NEVER TRUE

## 2023-09-20 SDOH — ECONOMIC STABILITY: INCOME INSECURITY: HOW HARD IS IT FOR YOU TO PAY FOR THE VERY BASICS LIKE FOOD, HOUSING, MEDICAL CARE, AND HEATING?: NOT HARD AT ALL

## 2023-09-20 SDOH — ECONOMIC STABILITY: FOOD INSECURITY: WITHIN THE PAST 12 MONTHS, YOU WORRIED THAT YOUR FOOD WOULD RUN OUT BEFORE YOU GOT MONEY TO BUY MORE.: NEVER TRUE

## 2023-09-20 SDOH — ECONOMIC STABILITY: HOUSING INSECURITY
IN THE LAST 12 MONTHS, WAS THERE A TIME WHEN YOU DID NOT HAVE A STEADY PLACE TO SLEEP OR SLEPT IN A SHELTER (INCLUDING NOW)?: NO

## 2023-09-20 ASSESSMENT — ENCOUNTER SYMPTOMS
DIARRHEA: 0
VOMITING: 0
CHEST TIGHTNESS: 0
RHINORRHEA: 0
SORE THROAT: 0
ABDOMINAL PAIN: 0
COUGH: 0
CONSTIPATION: 0
BLOOD IN STOOL: 0
EYE PAIN: 0
SHORTNESS OF BREATH: 0
NAUSEA: 0

## 2023-09-20 ASSESSMENT — PATIENT HEALTH QUESTIONNAIRE - PHQ9
SUM OF ALL RESPONSES TO PHQ QUESTIONS 1-9: 0
2. FEELING DOWN, DEPRESSED OR HOPELESS: 0
SUM OF ALL RESPONSES TO PHQ QUESTIONS 1-9: 0
SUM OF ALL RESPONSES TO PHQ9 QUESTIONS 1 & 2: 0
1. LITTLE INTEREST OR PLEASURE IN DOING THINGS: 0

## 2023-09-20 NOTE — PROGRESS NOTES
Lipid, Fasting     Standing Status:   Future     Standing Expiration Date:   9/20/2024    Hemoglobin A1C     Standing Status:   Future     Standing Expiration Date:   9/20/2024    Vitamin D 25 Hydroxy     Standing Status:   Future     Standing Expiration Date:   9/20/2024    TSH     Standing Status:   Future     Standing Expiration Date:   9/20/2024    Urinalysis     Standing Status:   Future     Standing Expiration Date:   9/20/2024    Microalbumin, Ur     Standing Status:   Future     Standing Expiration Date:   9/20/2024    Vitamin B12 & Folate     Standing Status:   Future     Standing Expiration Date:   9/20/2024    CBC with Auto Differential     Standing Status:   Future     Standing Expiration Date:   9/20/2024    External Referral To Gastroenterology     Referral Priority:   Routine     Referral Type:   Eval and Treat     Referral Reason:   Specialty Services Required     Referred to Provider:   Triston Scales MD     Requested Specialty:   Gastroenterology     Number of Visits Requested:   1       Requested Prescriptions     Signed Prescriptions Disp Refills    lisinopril-hydroCHLOROthiazide (ZESTORETIC) 20-12.5 MG per tablet 90 tablet 3     Sig: Take 1 tablet by mouth daily    magnesium oxide (MAG-OX) 400 MG tablet 30 tablet 5     Sig: Take 1 tablet by mouth daily    metoprolol tartrate (LOPRESSOR) 25 MG tablet 60 tablet 5     Sig: Take 1 tablet by mouth 2 times daily    potassium chloride (KLOR-CON M) 10 MEQ extended release tablet 30 tablet 1     Sig: Take 1 tablet by mouth daily    thiamine 100 MG tablet 30 tablet 5     Sig: Take 1 tablet by mouth daily    escitalopram (LEXAPRO) 10 MG tablet 30 tablet 3     Sig: Take 1 tablet by mouth daily    atorvastatin (LIPITOR) 20 MG tablet 30 tablet 1     Sig: Take 1 tablet by mouth daily        Halie Espinosa MD  9/20/2023  11:38 AM

## 2023-09-21 ENCOUNTER — TELEPHONE (OUTPATIENT)
Dept: FAMILY MEDICINE CLINIC | Age: 63
End: 2023-09-21

## 2023-09-21 DIAGNOSIS — R31.29 MICROHEMATURIA: Primary | ICD-10-CM

## 2023-09-21 DIAGNOSIS — F10.10 ALCOHOL ABUSE: ICD-10-CM

## 2023-09-21 DIAGNOSIS — R79.89 ELEVATED LFTS: ICD-10-CM

## 2023-09-21 DIAGNOSIS — R17 ELEVATED BILIRUBIN: ICD-10-CM

## 2023-09-22 NOTE — TELEPHONE ENCOUNTER
Let the patient know that blood work results showed    Cholesterol levels were elevated. HDL or good cholesterol was very elevated, LDL levels were elevated    Magnesium level was low and would recommend to continue magnesium supplement and would consider increasing to twice a day    Liver functions were elevated and bilirubin as a liver byproduct was also much more elevated when compared to previous would recommend ultrasound of the liver would recommend alcohol cessation would consider referral to GI    Hemoglobin level was borderline increased of uncertain cause red blood cell size was also enlarged which could be related to alcohol and liver disease    Hemoglobin A1c is a measure of 3-month sugar control was normal at 5.3.   No prediabetes or diabetes at present    Thyroid levels were normal    Vitamin U13 and folic acid levels were normal, vitamin B12 however was on the lower end of normal and would recommend continuing vitamin B complex vitamin daily    Vitamin D level was normal but on the low end of normal    Urine was negative for microscopic protein    Thanks

## 2023-09-23 LAB
BACTERIA: ABNORMAL
BILIRUBIN URINE: NEGATIVE
COLOR: YELLOW
GLUCOSE URINE: NEGATIVE MG/DL
KETONES, URINE: ABNORMAL MG/DL
LEUKOCYTE ESTERASE, URINE: ABNORMAL
NITRITE, URINE: NEGATIVE
PH UA: 6.5 (ref 5–9)
PROTEIN UA: NEGATIVE MG/DL
RBC UA: ABNORMAL /HPF
SPECIFIC GRAVITY UA: 1.01 (ref 1–1.03)
TURBIDITY: CLEAR
URINE HGB: ABNORMAL
UROBILINOGEN, URINE: 0.2 EU/DL (ref 0–1)
WBC UA: ABNORMAL /HPF

## 2023-09-24 NOTE — TELEPHONE ENCOUNTER
When the patient does call back for results please also let her know that in addition to the previous notes other remaining results have completed    These included the urine analysis which showed trace bacteria, ketones which could be related to dehydration and/or malnutrition, white blood cells, microscopic blood.      Would recommend increasing fluids such as water avoidance of alcohol increasing protein with supplements such as Ensure or boost and would consider urology evaluation for microscopic blood    Thanks

## 2023-09-26 ENCOUNTER — TELEPHONE (OUTPATIENT)
Dept: FAMILY MEDICINE CLINIC | Age: 63
End: 2023-09-26

## 2023-09-27 NOTE — TELEPHONE ENCOUNTER
After three calls to patient with no return call, I contacted patient's brother, Eduardo Cordova and advised him of patient's lab results. He is requesting recommendation and referral to both GI and Urology and order placed for U/S in [place] or [place]. I mentioned the alcohol cessation recommendation to Eduardo Cordova, he states patient probably won't be able to abstain from alcohol, but he will mention that to her when he calls her to discuss your recommendations.

## 2023-09-27 NOTE — TELEPHONE ENCOUNTER
Orders Placed This Encounter   Procedures    External Referral To Gastroenterology     Referral Priority:   Routine     Referral Type:   Eval and Treat     Referral Reason:   Specialty Services Required     Referred to Provider:   Stefania Kirby MD     Requested Specialty:   Gastroenterology     Number of Visits Requested:   1    MALIHA - Piedad Calderon MD, Urology, [place]     Referral Priority:   Routine     Referral Type:   Eval and Treat     Referral Reason:   Specialty Services Required     Referred to Provider:   Jhonny Mayfield MD     Requested Specialty:   Urology     Number of Visits Requested:   1     Referrals placed

## 2023-10-11 ENCOUNTER — TELEPHONE (OUTPATIENT)
Dept: FAMILY MEDICINE CLINIC | Age: 63
End: 2023-10-11

## 2023-10-11 NOTE — TELEPHONE ENCOUNTER
Left message asking patient to call the office back. We had referred her to [facility], but they have been unable to reach her to schedule. I was calling to see if she was still interested in the referral and if so, give her the number so she can call them. [phone]. Albendazole Pregnancy And Lactation Text: This medication is Pregnancy Category C and it isn't known if it is safe during pregnancy. It is also excreted in breast milk.

## 2023-11-09 NOTE — TELEPHONE ENCOUNTER
I spoke to her brother Emma Rivera who stated that there is no need to continue to call her for this because she does not want to go. She does not even want to see Dr Milly Mcgregor when she needs to. He feels like we are waisting our time trying to get her to back. How do you want to proceed with this?

## 2023-11-09 NOTE — TELEPHONE ENCOUNTER
My apologies that we have not been able to rectify the situation.   Unfortunately I am not certain how to proceed as I cannot force the patient to follow-up with me or do work-up if the patient does not want to and is of sound mind to make her own decisions

## 2023-11-20 RX ORDER — POTASSIUM CHLORIDE 750 MG/1
10 TABLET, EXTENDED RELEASE ORAL DAILY
Qty: 30 TABLET | Refills: 4 | Status: SHIPPED | OUTPATIENT
Start: 2023-11-20

## 2023-11-20 NOTE — TELEPHONE ENCOUNTER
Last Appointment:  9/20/2023  Future Appointments   Date Time Provider [address]   3/20/2024  9:30 AM MD SAIRA [facility]

## 2024-02-02 ENCOUNTER — APPOINTMENT (OUTPATIENT)
Dept: CT IMAGING | Age: 64
End: 2024-02-02
Payer: MEDICARE

## 2024-02-02 ENCOUNTER — HOSPITAL ENCOUNTER (EMERGENCY)
Age: 64
Discharge: HOME | End: 2024-02-02
Attending: EMERGENCY MEDICINE
Payer: MEDICARE

## 2024-02-02 VITALS
TEMPERATURE: 98.3 F | OXYGEN SATURATION: 95 % | RESPIRATION RATE: 16 BRPM | SYSTOLIC BLOOD PRESSURE: 153 MMHG | DIASTOLIC BLOOD PRESSURE: 88 MMHG | HEART RATE: 83 BPM

## 2024-02-02 DIAGNOSIS — S22.42XA CLOSED FRACTURE OF MULTIPLE RIBS OF LEFT SIDE, INITIAL ENCOUNTER: Primary | ICD-10-CM

## 2024-02-02 PROCEDURE — 71260 CT THORAX DX C+: CPT

## 2024-02-02 PROCEDURE — 6360000002 HC RX W HCPCS: Performed by: EMERGENCY MEDICINE

## 2024-02-02 PROCEDURE — 6360000004 HC RX CONTRAST MEDICATION: Performed by: RADIOLOGY

## 2024-02-02 PROCEDURE — 96374 THER/PROPH/DIAG INJ IV PUSH: CPT

## 2024-02-02 PROCEDURE — 99285 EMERGENCY DEPT VISIT HI MDM: CPT

## 2024-02-02 RX ORDER — KETOROLAC TROMETHAMINE 30 MG/ML
15 INJECTION, SOLUTION INTRAMUSCULAR; INTRAVENOUS ONCE
Status: COMPLETED | OUTPATIENT
Start: 2024-02-02 | End: 2024-02-02

## 2024-02-02 RX ORDER — HYDROCODONE BITARTRATE AND ACETAMINOPHEN 5; 325 MG/1; MG/1
1 TABLET ORAL EVERY 6 HOURS PRN
Qty: 12 TABLET | Refills: 0 | Status: SHIPPED | OUTPATIENT
Start: 2024-02-02 | End: 2024-02-05

## 2024-02-02 RX ADMIN — IOPAMIDOL 75 ML: 755 INJECTION, SOLUTION INTRAVENOUS at 13:16

## 2024-02-02 RX ADMIN — KETOROLAC TROMETHAMINE 15 MG: 30 INJECTION, SOLUTION INTRAMUSCULAR; INTRAVENOUS at 12:31

## 2024-02-02 ASSESSMENT — PAIN DESCRIPTION - DESCRIPTORS
DESCRIPTORS: ACHING;DISCOMFORT;THROBBING
DESCRIPTORS: ACHING;DISCOMFORT;THROBBING

## 2024-02-02 ASSESSMENT — PAIN DESCRIPTION - LOCATION
LOCATION: SHOULDER
LOCATION: SHOULDER
LOCATION: SHOULDER;RIB CAGE

## 2024-02-02 ASSESSMENT — PAIN - FUNCTIONAL ASSESSMENT: PAIN_FUNCTIONAL_ASSESSMENT: 0-10

## 2024-02-02 ASSESSMENT — ENCOUNTER SYMPTOMS
CHEST TIGHTNESS: 0
SHORTNESS OF BREATH: 0

## 2024-02-02 ASSESSMENT — PAIN DESCRIPTION - ONSET: ONSET: SUDDEN

## 2024-02-02 ASSESSMENT — PAIN DESCRIPTION - ORIENTATION
ORIENTATION: LEFT

## 2024-02-02 ASSESSMENT — PAIN DESCRIPTION - PAIN TYPE: TYPE: ACUTE PAIN

## 2024-02-02 ASSESSMENT — PAIN SCALES - GENERAL: PAINLEVEL_OUTOF10: 8

## 2024-02-02 ASSESSMENT — PAIN DESCRIPTION - FREQUENCY: FREQUENCY: CONTINUOUS

## 2024-02-02 NOTE — ED PROVIDER NOTES
63-year-old female presenting with pain to the left ribs and shoulder.  She fell 2 days ago.  Is mechanical fall.  Did not hit her head or neck.  Is awake and alert and oriented.  Has no abdominal or back pain.  Is also seated with her ribs on the left side.  The shoulder.         Family History   Problem Relation Age of Onset    Arthritis Mother     Cancer Father         colon?    High Blood Pressure Father     Hypertension Brother      Past Surgical History:   Procedure Laterality Date    BREAST CYST EXCISION      COLONOSCOPY  10/17/2014    repeat 5 years [place] - hemorrhoids sessile polyp    WRIST SURGERY         Review of Systems   Constitutional:  Negative for chills and fever.   Respiratory:  Negative for chest tightness and shortness of breath.    Cardiovascular:  Negative for chest pain.   Musculoskeletal:         Left-sided rib pain        Physical Exam  Constitutional:       General: She is not in acute distress.     Appearance: She is well-developed.   HENT:      Head: Normocephalic and atraumatic.   Eyes:      Conjunctiva/sclera: Conjunctivae normal.      Pupils: Pupils are equal, round, and reactive to light.   Neck:      Thyroid: No thyromegaly.   Cardiovascular:      Rate and Rhythm: Normal rate and regular rhythm.   Pulmonary:      Effort: Pulmonary effort is normal. No respiratory distress.      Breath sounds: Normal breath sounds.      Comments: Tenderness to palpation of the left-sided ribs  Chest:      Chest wall: Tenderness present.   Abdominal:      General: There is no distension.      Palpations: Abdomen is soft.      Tenderness: There is no abdominal tenderness. There is no guarding or rebound.      Comments: No abdominal discomfort nor tenderness   Musculoskeletal:         General: No tenderness. Normal range of motion.      Cervical back: Normal range of motion.   Skin:     General: Skin is warm and dry.      Findings: No erythema.   Neurological:      Mental Status: She is alert and

## 2024-02-07 ENCOUNTER — TELEPHONE (OUTPATIENT)
Dept: PRIMARY CARE CLINIC | Age: 64
End: 2024-02-07

## 2024-02-07 ENCOUNTER — OFFICE VISIT (OUTPATIENT)
Dept: PRIMARY CARE CLINIC | Age: 64
End: 2024-02-07
Payer: MEDICARE

## 2024-02-07 VITALS
OXYGEN SATURATION: 92 % | DIASTOLIC BLOOD PRESSURE: 62 MMHG | TEMPERATURE: 97.9 F | BODY MASS INDEX: 22.44 KG/M2 | SYSTOLIC BLOOD PRESSURE: 98 MMHG | WEIGHT: 143 LBS | HEART RATE: 81 BPM | HEIGHT: 67 IN

## 2024-02-07 DIAGNOSIS — R73.9 HYPERGLYCEMIA: ICD-10-CM

## 2024-02-07 DIAGNOSIS — E83.42 HYPOMAGNESEMIA: ICD-10-CM

## 2024-02-07 DIAGNOSIS — F10.10 ALCOHOL ABUSE: ICD-10-CM

## 2024-02-07 DIAGNOSIS — I10 ESSENTIAL HYPERTENSION: ICD-10-CM

## 2024-02-07 DIAGNOSIS — E87.6 HYPOKALEMIA: ICD-10-CM

## 2024-02-07 DIAGNOSIS — S22.42XS CLOSED FRACTURE OF MULTIPLE RIBS OF LEFT SIDE, SEQUELA: Primary | ICD-10-CM

## 2024-02-07 DIAGNOSIS — E78.2 MIXED HYPERLIPIDEMIA: ICD-10-CM

## 2024-02-07 DIAGNOSIS — R53.83 OTHER FATIGUE: ICD-10-CM

## 2024-02-07 DIAGNOSIS — K21.9 GASTROESOPHAGEAL REFLUX DISEASE WITHOUT ESOPHAGITIS: ICD-10-CM

## 2024-02-07 DIAGNOSIS — E55.9 VITAMIN D DEFICIENCY: ICD-10-CM

## 2024-02-07 DIAGNOSIS — R41.3 MEMORY CHANGES: ICD-10-CM

## 2024-02-07 DIAGNOSIS — F41.1 GENERALIZED ANXIETY DISORDER: ICD-10-CM

## 2024-02-07 DIAGNOSIS — E53.8 VITAMIN B12 DEFICIENCY: ICD-10-CM

## 2024-02-07 PROCEDURE — 1123F ACP DISCUSS/DSCN MKR DOCD: CPT | Performed by: INTERNAL MEDICINE

## 2024-02-07 PROCEDURE — 3074F SYST BP LT 130 MM HG: CPT | Performed by: INTERNAL MEDICINE

## 2024-02-07 PROCEDURE — 3078F DIAST BP <80 MM HG: CPT | Performed by: INTERNAL MEDICINE

## 2024-02-07 PROCEDURE — 99214 OFFICE O/P EST MOD 30 MIN: CPT | Performed by: INTERNAL MEDICINE

## 2024-02-07 RX ORDER — LANOLIN ALCOHOL/MO/W.PET/CERES
1000 CREAM (GRAM) TOPICAL DAILY
Qty: 30 TABLET | Refills: 3 | Status: SHIPPED | OUTPATIENT
Start: 2024-02-07

## 2024-02-07 RX ORDER — TRAMADOL HYDROCHLORIDE 50 MG/1
50 TABLET ORAL DAILY PRN
Qty: 14 TABLET | Refills: 0 | Status: SHIPPED | OUTPATIENT
Start: 2024-02-07 | End: 2024-02-21

## 2024-02-07 RX ORDER — THIAMINE MONONITRATE (VIT B1) 100 MG
100 TABLET ORAL DAILY
Qty: 30 TABLET | Refills: 3 | Status: SHIPPED | OUTPATIENT
Start: 2024-02-07

## 2024-02-07 RX ORDER — LIDOCAINE 50 MG/G
1 PATCH TOPICAL DAILY
Qty: 10 PATCH | Refills: 0 | Status: SHIPPED | OUTPATIENT
Start: 2024-02-07 | End: 2024-02-17

## 2024-02-07 ASSESSMENT — ENCOUNTER SYMPTOMS
CONSTIPATION: 0
ABDOMINAL PAIN: 0
DIARRHEA: 0
RHINORRHEA: 0
BLOOD IN STOOL: 0
EYE PAIN: 0
VOMITING: 0
COUGH: 0
SHORTNESS OF BREATH: 0
NAUSEA: 0
SORE THROAT: 0
CHEST TIGHTNESS: 0

## 2024-02-07 ASSESSMENT — PATIENT HEALTH QUESTIONNAIRE - PHQ9
SUM OF ALL RESPONSES TO PHQ9 QUESTIONS 1 & 2: 0
SUM OF ALL RESPONSES TO PHQ QUESTIONS 1-9: 0
SUM OF ALL RESPONSES TO PHQ QUESTIONS 1-9: 0
2. FEELING DOWN, DEPRESSED OR HOPELESS: 0
1. LITTLE INTEREST OR PLEASURE IN DOING THINGS: 0
SUM OF ALL RESPONSES TO PHQ QUESTIONS 1-9: 0
SUM OF ALL RESPONSES TO PHQ QUESTIONS 1-9: 0

## 2024-02-07 NOTE — TELEPHONE ENCOUNTER
Patient just left office and noticed that there were labs orders on discharge summary. When did you want patient to complete these? Patient already at home so she cannot come back today, but they just need to know if you want done before next visit

## 2024-02-07 NOTE — TELEPHONE ENCOUNTER
Advised patient of insurance denial for Lidocaine Patch, Dr. Olea recommends OTC Salon Pas Lidocaine Patch, patient acknowledged understanding.

## 2024-02-07 NOTE — PROGRESS NOTES
complains of having issues with memory.  Discussed testing and referral and other work-up with potential MRI.  Concern with alcohol use.  Declined all testing and referrals    - States has Hypertension. Not checking blood pressure at home. On lisinopri/hctz and metoprolol. No reporte side effects.     - Gastroesophageal Reflux Disease (GERD). States tried to watch diet. Off PPI. States treating with Maalox prn.     - States left displaced humeral neck fracture. States slipped on ice. States was given referral in urgent care, did not schedule. Our office provided referral to closer location - did not schedule. Has been asking for pain medication.  Patient arrived again without her sling. States does wear sling at home most of the time. States pain in manageable. Has seen ortho. Approaching non operative at present. Never sought treatment. Has decreased ROM. No pain.      - h/o Cervical Dysplasia    Health Maintenance   - immunizations:   Influenza Vaccination - declines  Pneumonia Vaccination  Zoster/Shingles Vaccine  Tetanus Vaccination  covid - (3/6/2021) #1, (4/3/2021) #2 - moderna    - Screenings:   Bone Density Scan   Pelvic/Pap Exam  Mammogram     Colonoscopy - (2014) - hemorrhoids, sessile polyp - repeat 5yrs    ROS:  Review of Systems   Constitutional:  Negative for appetite change, chills, fever and unexpected weight change.   HENT:  Negative for congestion, rhinorrhea and sore throat.    Eyes:  Negative for pain and visual disturbance.   Respiratory:  Negative for cough, chest tightness and shortness of breath.    Cardiovascular:  Negative for chest pain and palpitations.   Gastrointestinal:  Negative for abdominal pain, blood in stool, constipation, diarrhea, nausea and vomiting.   Genitourinary:  Negative for difficulty urinating, dysuria, frequency, pelvic pain, urgency and vaginal bleeding.   Musculoskeletal:  Negative for arthralgias and myalgias.   Skin:  Negative for rash.   Neurological:  Negative

## 2024-02-07 NOTE — TELEPHONE ENCOUNTER
I was under the understanding that the patient was going to go and do the blood work after our appointment    Since she did not I would recommend that she do the blood work whenever she is able

## 2024-02-08 LAB
EKG ATRIAL RATE: 66 BPM
EKG P AXIS: 23 DEGREES
EKG P-R INTERVAL: 144 MS
EKG Q-T INTERVAL: 420 MS
EKG QRS DURATION: 88 MS
EKG QTC CALCULATION (BAZETT): 440 MS
EKG R AXIS: 13 DEGREES
EKG T AXIS: 34 DEGREES
EKG VENTRICULAR RATE: 66 BPM

## 2024-02-15 ENCOUNTER — HOSPITAL ENCOUNTER (INPATIENT)
Age: 64
LOS: 5 days | Discharge: NURSING, REHAB OR LONG-TERM CARE | DRG: 897 | End: 2024-02-21
Attending: EMERGENCY MEDICINE | Admitting: INTERNAL MEDICINE
Payer: MEDICARE

## 2024-02-15 ENCOUNTER — APPOINTMENT (OUTPATIENT)
Dept: GENERAL RADIOLOGY | Age: 64
DRG: 897 | End: 2024-02-15
Payer: MEDICARE

## 2024-02-15 DIAGNOSIS — F10.10 ALCOHOL ABUSE: ICD-10-CM

## 2024-02-15 DIAGNOSIS — S22.32XA CLOSED FRACTURE OF ONE RIB OF LEFT SIDE, INITIAL ENCOUNTER: ICD-10-CM

## 2024-02-15 DIAGNOSIS — E83.42 HYPOMAGNESEMIA: ICD-10-CM

## 2024-02-15 DIAGNOSIS — E87.6 HYPOKALEMIA: ICD-10-CM

## 2024-02-15 DIAGNOSIS — F10.929 ACUTE ALCOHOLIC INTOXICATION WITH COMPLICATION (HCC): Primary | ICD-10-CM

## 2024-02-15 LAB
ABSOLUTE BANDS: NORMAL K/UL (ref 0–1)
ABSOLUTE PLASMA CELLS: NORMAL K/UL
ALBUMIN SERPL-MCNC: 4.3 G/DL (ref 3.5–5.2)
ALP SERPL-CCNC: 115 U/L (ref 35–104)
ALT SERPL-CCNC: 36 U/L (ref 0–32)
AMPHET UR QL SCN: NEGATIVE
ANION GAP SERPL CALCULATED.3IONS-SCNC: 19 MMOL/L (ref 7–16)
APAP SERPL-MCNC: 8 UG/ML (ref 10–30)
AST SERPL-CCNC: 105 U/L (ref 0–31)
ATYPICAL LYMPHOCYTE ABSOLUTE COUNT: NORMAL K/UL
ATYPICAL LYMPHOCYTES: NORMAL %
BANDS: NORMAL %
BARBITURATES UR QL SCN: NEGATIVE
BASOPHILS # BLD: 0.05 K/UL (ref 0–0.2)
BASOPHILS # BLD: NORMAL K/UL (ref 0–0.2)
BASOPHILS NFR BLD: 1 % (ref 0–2)
BASOPHILS NFR BLD: NORMAL % (ref 0–2)
BENZODIAZ UR QL: NEGATIVE
BILIRUB SERPL-MCNC: 1.2 MG/DL (ref 0–1.2)
BLASTS ABSOLUTE COUNT: NORMAL K/UL
BLASTS: NORMAL %
BUN SERPL-MCNC: 12 MG/DL (ref 6–23)
BUPRENORPHINE UR QL: NEGATIVE
CALCIUM SERPL-MCNC: 8.8 MG/DL (ref 8.6–10.2)
CANNABINOIDS UR QL SCN: NEGATIVE
CHLORIDE SERPL-SCNC: 97 MMOL/L (ref 98–107)
CO2 SERPL-SCNC: 25 MMOL/L (ref 22–29)
COCAINE UR QL SCN: NEGATIVE
CREAT SERPL-MCNC: 0.5 MG/DL (ref 0.5–1)
EOSINOPHIL # BLD: 0.02 K/UL (ref 0.05–0.5)
EOSINOPHIL # BLD: NORMAL K/UL (ref 0–0.4)
EOSINOPHILS RELATIVE PERCENT: 1 % (ref 0–6)
EOSINOPHILS RELATIVE PERCENT: NORMAL % (ref 1–4)
ERYTHROCYTE [DISTWIDTH] IN BLOOD BY AUTOMATED COUNT: 13.2 % (ref 11.5–15)
ERYTHROCYTE [DISTWIDTH] IN BLOOD BY AUTOMATED COUNT: NORMAL % (ref 11.8–14.4)
ETHANOLAMINE SERPL-MCNC: 272 MG/DL
FENTANYL UR QL: NEGATIVE
GFR SERPL CREATININE-BSD FRML MDRD: >60 ML/MIN/1.73M2
GLUCOSE SERPL-MCNC: 80 MG/DL (ref 74–99)
HCT VFR BLD AUTO: 42.7 % (ref 34–48)
HCT VFR BLD AUTO: NORMAL % (ref 36.3–47.1)
HGB BLD-MCNC: 15.3 G/DL (ref 11.5–15.5)
HGB BLD-MCNC: NORMAL G/DL (ref 11.9–15.1)
IMM GRANULOCYTES # BLD AUTO: <0.03 K/UL (ref 0–0.58)
IMM GRANULOCYTES # BLD AUTO: NORMAL K/UL (ref 0–0.3)
IMM GRANULOCYTES NFR BLD: 1 % (ref 0–5)
IMM GRANULOCYTES NFR BLD: NORMAL %
LIPASE SERPL-CCNC: 53 U/L (ref 13–60)
LYMPHOCYTES NFR BLD: 0.75 K/UL (ref 1.5–4)
LYMPHOCYTES NFR BLD: NORMAL K/UL (ref 1–4.8)
LYMPHOCYTES RELATIVE PERCENT: 18 % (ref 20–42)
LYMPHOCYTES RELATIVE PERCENT: NORMAL % (ref 24–44)
MAGNESIUM SERPL-MCNC: 1.3 MG/DL (ref 1.6–2.6)
MCH RBC QN AUTO: 35.6 PG (ref 26–35)
MCH RBC QN AUTO: NORMAL PG (ref 25.2–33.5)
MCHC RBC AUTO-ENTMCNC: 35.8 G/DL (ref 32–34.5)
MCHC RBC AUTO-ENTMCNC: NORMAL G/DL (ref 28.4–34.8)
MCV RBC AUTO: 99.3 FL (ref 80–99.9)
MCV RBC AUTO: NORMAL FL (ref 82.6–102.9)
METAMYELOCYTES ABSOLUTE COUNT: NORMAL K/UL
METAMYELOCYTES: NORMAL %
METHADONE UR QL: NEGATIVE
MONOCYTES NFR BLD: 0.21 K/UL (ref 0.1–0.95)
MONOCYTES NFR BLD: 5 % (ref 2–12)
MONOCYTES NFR BLD: NORMAL % (ref 1–7)
MONOCYTES NFR BLD: NORMAL K/UL (ref 0.1–0.8)
MYELOCYTES ABSOLUTE COUNT: NORMAL K/UL
MYELOCYTES: NORMAL %
NEUTROPHILS NFR BLD: 75 % (ref 43–80)
NEUTROPHILS NFR BLD: NORMAL % (ref 36–66)
NEUTS SEG NFR BLD: 3.13 K/UL (ref 1.8–7.3)
NEUTS SEG NFR BLD: NORMAL K/UL (ref 1.8–7.7)
NRBC BLD-RTO: NORMAL PER 100 WBC
NUCLEATED RED BLOOD CELLS: NORMAL PER 100 WBC
OPIATES UR QL SCN: NEGATIVE
OXYCODONE UR QL SCN: NEGATIVE
PCP UR QL SCN: NEGATIVE
PLASMA CELLS: NORMAL %
PLATELET # BLD AUTO: 215 K/UL (ref 130–450)
PLATELET # BLD AUTO: NORMAL K/UL (ref 138–453)
PLATELET ESTIMATE: NORMAL
PLATELET, FLUORESCENCE: NORMAL K/UL (ref 138–453)
PLATELETS.RETICULATED NFR BLD AUTO: NORMAL % (ref 1.1–10.3)
PMV BLD AUTO: 9 FL (ref 7–12)
PMV BLD AUTO: NORMAL FL (ref 8.1–13.5)
POTASSIUM SERPL-SCNC: 3 MMOL/L (ref 3.5–5)
PROMYELOCYTES ABSOLUTE COUNT: NORMAL K/UL
PROMYELOCYTES: NORMAL %
PROT SERPL-MCNC: 7.2 G/DL (ref 6.4–8.3)
RBC # BLD AUTO: 4.3 M/UL (ref 3.5–5.5)
RBC # BLD AUTO: NORMAL M/UL (ref 3.95–5.11)
RBC # BLD: NORMAL 10*6/UL
SALICYLATES SERPL-MCNC: <0.3 MG/DL (ref 0–30)
SODIUM SERPL-SCNC: 141 MMOL/L (ref 132–146)
TEST INFORMATION: NORMAL
TOXIC TRICYCLIC SC,BLOOD: NEGATIVE
WBC # BLD: NORMAL 10*3/UL
WBC OTHER # BLD: 4.2 K/UL (ref 4.5–11.5)
WBC OTHER # BLD: NORMAL K/UL (ref 3.5–11.3)

## 2024-02-15 PROCEDURE — 2580000003 HC RX 258: Performed by: EMERGENCY MEDICINE

## 2024-02-15 PROCEDURE — 80179 DRUG ASSAY SALICYLATE: CPT

## 2024-02-15 PROCEDURE — 6370000000 HC RX 637 (ALT 250 FOR IP): Performed by: PHYSICIAN ASSISTANT

## 2024-02-15 PROCEDURE — 99285 EMERGENCY DEPT VISIT HI MDM: CPT

## 2024-02-15 PROCEDURE — 96374 THER/PROPH/DIAG INJ IV PUSH: CPT

## 2024-02-15 PROCEDURE — 83735 ASSAY OF MAGNESIUM: CPT

## 2024-02-15 PROCEDURE — 71101 X-RAY EXAM UNILAT RIBS/CHEST: CPT

## 2024-02-15 PROCEDURE — 96375 TX/PRO/DX INJ NEW DRUG ADDON: CPT

## 2024-02-15 PROCEDURE — 6360000002 HC RX W HCPCS: Performed by: PHYSICIAN ASSISTANT

## 2024-02-15 PROCEDURE — G0480 DRUG TEST DEF 1-7 CLASSES: HCPCS

## 2024-02-15 PROCEDURE — 85025 COMPLETE CBC W/AUTO DIFF WBC: CPT

## 2024-02-15 PROCEDURE — 80307 DRUG TEST PRSMV CHEM ANLYZR: CPT

## 2024-02-15 PROCEDURE — 83690 ASSAY OF LIPASE: CPT

## 2024-02-15 PROCEDURE — 6370000000 HC RX 637 (ALT 250 FOR IP): Performed by: EMERGENCY MEDICINE

## 2024-02-15 PROCEDURE — 93005 ELECTROCARDIOGRAM TRACING: CPT | Performed by: PHYSICIAN ASSISTANT

## 2024-02-15 PROCEDURE — 80143 DRUG ASSAY ACETAMINOPHEN: CPT

## 2024-02-15 PROCEDURE — 80053 COMPREHEN METABOLIC PANEL: CPT

## 2024-02-15 RX ORDER — LANOLIN ALCOHOL/MO/W.PET/CERES
400 CREAM (GRAM) TOPICAL ONCE
Status: COMPLETED | OUTPATIENT
Start: 2024-02-15 | End: 2024-02-15

## 2024-02-15 RX ORDER — OXYCODONE HYDROCHLORIDE 5 MG/1
5 TABLET ORAL EVERY 4 HOURS PRN
Status: DISCONTINUED | OUTPATIENT
Start: 2024-02-15 | End: 2024-02-16

## 2024-02-15 RX ORDER — SODIUM CHLORIDE 0.9 % (FLUSH) 0.9 %
5-40 SYRINGE (ML) INJECTION PRN
Status: DISCONTINUED | OUTPATIENT
Start: 2024-02-15 | End: 2024-02-21 | Stop reason: HOSPADM

## 2024-02-15 RX ORDER — LANOLIN ALCOHOL/MO/W.PET/CERES
100 CREAM (GRAM) TOPICAL DAILY
Status: DISCONTINUED | OUTPATIENT
Start: 2024-02-16 | End: 2024-02-16 | Stop reason: SDUPTHER

## 2024-02-15 RX ORDER — LORAZEPAM 1 MG/1
3 TABLET ORAL
Status: DISCONTINUED | OUTPATIENT
Start: 2024-02-15 | End: 2024-02-21 | Stop reason: HOSPADM

## 2024-02-15 RX ORDER — LORAZEPAM 1 MG/1
4 TABLET ORAL
Status: DISCONTINUED | OUTPATIENT
Start: 2024-02-15 | End: 2024-02-21 | Stop reason: HOSPADM

## 2024-02-15 RX ORDER — LORAZEPAM 2 MG/ML
3 INJECTION INTRAMUSCULAR
Status: DISCONTINUED | OUTPATIENT
Start: 2024-02-15 | End: 2024-02-21 | Stop reason: HOSPADM

## 2024-02-15 RX ORDER — SODIUM CHLORIDE 0.9 % (FLUSH) 0.9 %
5-40 SYRINGE (ML) INJECTION EVERY 12 HOURS SCHEDULED
Status: DISCONTINUED | OUTPATIENT
Start: 2024-02-15 | End: 2024-02-21 | Stop reason: HOSPADM

## 2024-02-15 RX ORDER — SODIUM CHLORIDE 9 MG/ML
INJECTION, SOLUTION INTRAVENOUS PRN
Status: DISCONTINUED | OUTPATIENT
Start: 2024-02-15 | End: 2024-02-16 | Stop reason: SDUPTHER

## 2024-02-15 RX ORDER — ACETAMINOPHEN 325 MG/1
650 TABLET ORAL EVERY 6 HOURS PRN
Status: DISCONTINUED | OUTPATIENT
Start: 2024-02-15 | End: 2024-02-16 | Stop reason: SDUPTHER

## 2024-02-15 RX ORDER — LORAZEPAM 1 MG/1
2 TABLET ORAL
Status: DISCONTINUED | OUTPATIENT
Start: 2024-02-15 | End: 2024-02-21 | Stop reason: HOSPADM

## 2024-02-15 RX ORDER — LORAZEPAM 2 MG/ML
4 INJECTION INTRAMUSCULAR
Status: DISCONTINUED | OUTPATIENT
Start: 2024-02-15 | End: 2024-02-21 | Stop reason: HOSPADM

## 2024-02-15 RX ORDER — LORAZEPAM 1 MG/1
1 TABLET ORAL
Status: DISCONTINUED | OUTPATIENT
Start: 2024-02-15 | End: 2024-02-21 | Stop reason: HOSPADM

## 2024-02-15 RX ORDER — LORAZEPAM 2 MG/ML
2 INJECTION INTRAMUSCULAR
Status: DISCONTINUED | OUTPATIENT
Start: 2024-02-15 | End: 2024-02-21 | Stop reason: HOSPADM

## 2024-02-15 RX ORDER — LORAZEPAM 2 MG/ML
1 INJECTION INTRAMUSCULAR
Status: DISCONTINUED | OUTPATIENT
Start: 2024-02-15 | End: 2024-02-21 | Stop reason: HOSPADM

## 2024-02-15 RX ORDER — THIAMINE HYDROCHLORIDE 100 MG/ML
100 INJECTION, SOLUTION INTRAMUSCULAR; INTRAVENOUS ONCE
Status: COMPLETED | OUTPATIENT
Start: 2024-02-15 | End: 2024-02-15

## 2024-02-15 RX ADMIN — OXYCODONE 5 MG: 5 TABLET ORAL at 23:54

## 2024-02-15 RX ADMIN — ACETAMINOPHEN 650 MG: 325 TABLET ORAL at 23:54

## 2024-02-15 RX ADMIN — Medication 400 MG: at 23:52

## 2024-02-15 RX ADMIN — LORAZEPAM 2 MG: 1 TABLET ORAL at 23:53

## 2024-02-15 RX ADMIN — SODIUM CHLORIDE, PRESERVATIVE FREE 5 ML: 5 INJECTION INTRAVENOUS at 23:50

## 2024-02-15 RX ADMIN — THIAMINE HYDROCHLORIDE 100 MG: 100 INJECTION, SOLUTION INTRAMUSCULAR; INTRAVENOUS at 13:43

## 2024-02-15 RX ADMIN — POTASSIUM BICARBONATE 40 MEQ: 782 TABLET, EFFERVESCENT ORAL at 17:53

## 2024-02-15 ASSESSMENT — LIFESTYLE VARIABLES
HOW OFTEN DO YOU HAVE A DRINK CONTAINING ALCOHOL: 4 OR MORE TIMES A WEEK
HOW MANY STANDARD DRINKS CONTAINING ALCOHOL DO YOU HAVE ON A TYPICAL DAY: 10 OR MORE

## 2024-02-15 ASSESSMENT — PAIN SCALES - GENERAL
PAINLEVEL_OUTOF10: 8
PAINLEVEL_OUTOF10: 9

## 2024-02-15 ASSESSMENT — PAIN DESCRIPTION - LOCATION
LOCATION: RIB CAGE
LOCATION: RIB CAGE

## 2024-02-15 ASSESSMENT — PAIN DESCRIPTION - FREQUENCY: FREQUENCY: CONTINUOUS

## 2024-02-15 ASSESSMENT — PAIN - FUNCTIONAL ASSESSMENT: PAIN_FUNCTIONAL_ASSESSMENT: 0-10

## 2024-02-15 ASSESSMENT — PAIN DESCRIPTION - DESCRIPTORS: DESCRIPTORS: SORE

## 2024-02-15 ASSESSMENT — PAIN DESCRIPTION - PAIN TYPE: TYPE: ACUTE PAIN

## 2024-02-15 ASSESSMENT — PAIN DESCRIPTION - ORIENTATION: ORIENTATION: LEFT

## 2024-02-15 ASSESSMENT — PAIN DESCRIPTION - ONSET: ONSET: ON-GOING

## 2024-02-15 NOTE — CARE COORDINATION
Social Work /Transition of Care:    Pt presents to the ED secondary to altered mental status from home.  SW received call from [place],  with [facility], who states pt fell approximately one month ago and has been complaining of rib pain.  Pt also drinks daily, reporting a gallon of vodka daily.  Pt lives alone and neighbors assist at times.

## 2024-02-15 NOTE — ED PROVIDER NOTES
no administration in time range)   magnesium sulfate 2000 mg in 50 mL IVPB premix (has no administration in time range)   enoxaparin (LOVENOX) injection 40 mg (40 mg SubCUTAneous Given 2/16/24 1224)   ondansetron (ZOFRAN-ODT) disintegrating tablet 4 mg (has no administration in time range)     Or   ondansetron (ZOFRAN) injection 4 mg (has no administration in time range)   polyethylene glycol (GLYCOLAX) packet 17 g (has no administration in time range)   acetaminophen (TYLENOL) tablet 650 mg (has no administration in time range)     Or   acetaminophen (TYLENOL) suppository 650 mg (has no administration in time range)   lisinopril (PRINIVIL;ZESTRIL) tablet 20 mg (20 mg Oral Given 2/16/24 1221)     And   hydroCHLOROthiazide (HYDRODIURIL) tablet 12.5 mg (12.5 mg Oral Given 2/16/24 1221)   oxyCODONE (ROXICODONE) immediate release tablet 5 mg (5 mg Oral Given 2/17/24 0533)   hydrALAZINE (APRESOLINE) injection 5 mg (5 mg IntraVENous Given 2/16/24 2115)   thiamine (B-1) injection 100 mg (100 mg IntraVENous Given 2/15/24 1343)   potassium bicarb-citric acid (EFFER-K) effervescent tablet 40 mEq (40 mEq Oral Given 2/15/24 1753)   magnesium oxide (MAG-OX) tablet 400 mg (400 mg Oral Given 2/15/24 2352)   magnesium sulfate 2000 mg in 50 mL IVPB premix (0 mg IntraVENous Stopped 2/16/24 1230)       Abnormal Labs Reviewed   COMPREHENSIVE METABOLIC PANEL - Abnormal; Notable for the following components:       Result Value    Potassium 3.0 (*)     Chloride 97 (*)     Anion Gap 19 (*)     Alkaline Phosphatase 115 (*)     ALT 36 (*)      (*)     All other components within normal limits   SERUM DRUG SCREEN - Abnormal; Notable for the following components:    Acetaminophen Level 8 (*)     Ethanol 272 (*)     All other components within normal limits   CBC WITH AUTO DIFFERENTIAL - Abnormal; Notable for the following components:    WBC 4.2 (*)     MCH 35.6 (*)     MCHC 35.8 (*)     Lymphocytes % 18 (*)     Lymphocytes Absolute 0.75

## 2024-02-16 ENCOUNTER — APPOINTMENT (OUTPATIENT)
Dept: GENERAL RADIOLOGY | Age: 64
DRG: 897 | End: 2024-02-16
Payer: MEDICARE

## 2024-02-16 PROBLEM — F10.931 ALCOHOL WITHDRAWAL DELIRIUM (HCC): Status: ACTIVE | Noted: 2024-02-16

## 2024-02-16 LAB
ANION GAP SERPL CALCULATED.3IONS-SCNC: 22 MMOL/L (ref 7–16)
BUN SERPL-MCNC: 12 MG/DL (ref 6–23)
CALCIUM SERPL-MCNC: 8.5 MG/DL (ref 8.6–10.2)
CHLORIDE SERPL-SCNC: 91 MMOL/L (ref 98–107)
CO2 SERPL-SCNC: 21 MMOL/L (ref 22–29)
CREAT SERPL-MCNC: 0.5 MG/DL (ref 0.5–1)
ETHANOLAMINE SERPL-MCNC: 89 MG/DL
GFR SERPL CREATININE-BSD FRML MDRD: >60 ML/MIN/1.73M2
GLUCOSE SERPL-MCNC: 76 MG/DL (ref 74–99)
MAGNESIUM SERPL-MCNC: 1.1 MG/DL (ref 1.6–2.6)
POTASSIUM SERPL-SCNC: 3.4 MMOL/L (ref 3.5–5)
SODIUM SERPL-SCNC: 134 MMOL/L (ref 132–146)

## 2024-02-16 PROCEDURE — 1200000000 HC SEMI PRIVATE

## 2024-02-16 PROCEDURE — 83735 ASSAY OF MAGNESIUM: CPT

## 2024-02-16 PROCEDURE — 6370000000 HC RX 637 (ALT 250 FOR IP): Performed by: EMERGENCY MEDICINE

## 2024-02-16 PROCEDURE — 2580000003 HC RX 258: Performed by: FAMILY MEDICINE

## 2024-02-16 PROCEDURE — 73030 X-RAY EXAM OF SHOULDER: CPT

## 2024-02-16 PROCEDURE — 6370000000 HC RX 637 (ALT 250 FOR IP): Performed by: FAMILY MEDICINE

## 2024-02-16 PROCEDURE — 6360000002 HC RX W HCPCS: Performed by: EMERGENCY MEDICINE

## 2024-02-16 PROCEDURE — 6360000002 HC RX W HCPCS: Performed by: FAMILY MEDICINE

## 2024-02-16 PROCEDURE — 80048 BASIC METABOLIC PNL TOTAL CA: CPT

## 2024-02-16 PROCEDURE — 6360000002 HC RX W HCPCS: Performed by: NURSE PRACTITIONER

## 2024-02-16 RX ORDER — ONDANSETRON 4 MG/1
4 TABLET, ORALLY DISINTEGRATING ORAL EVERY 8 HOURS PRN
Status: DISCONTINUED | OUTPATIENT
Start: 2024-02-16 | End: 2024-02-21 | Stop reason: HOSPADM

## 2024-02-16 RX ORDER — SODIUM CHLORIDE 0.9 % (FLUSH) 0.9 %
5-40 SYRINGE (ML) INJECTION EVERY 12 HOURS SCHEDULED
Status: DISCONTINUED | OUTPATIENT
Start: 2024-02-16 | End: 2024-02-21 | Stop reason: HOSPADM

## 2024-02-16 RX ORDER — ACETAMINOPHEN 650 MG/1
650 SUPPOSITORY RECTAL EVERY 6 HOURS PRN
Status: DISCONTINUED | OUTPATIENT
Start: 2024-02-16 | End: 2024-02-21 | Stop reason: HOSPADM

## 2024-02-16 RX ORDER — HYDROCHLOROTHIAZIDE 25 MG/1
12.5 TABLET ORAL DAILY
Status: DISCONTINUED | OUTPATIENT
Start: 2024-02-16 | End: 2024-02-21 | Stop reason: HOSPADM

## 2024-02-16 RX ORDER — SODIUM CHLORIDE 9 MG/ML
INJECTION, SOLUTION INTRAVENOUS CONTINUOUS
Status: DISCONTINUED | OUTPATIENT
Start: 2024-02-16 | End: 2024-02-21 | Stop reason: HOSPADM

## 2024-02-16 RX ORDER — HYDRALAZINE HYDROCHLORIDE 20 MG/ML
5 INJECTION INTRAMUSCULAR; INTRAVENOUS EVERY 6 HOURS PRN
Status: DISCONTINUED | OUTPATIENT
Start: 2024-02-16 | End: 2024-02-21 | Stop reason: HOSPADM

## 2024-02-16 RX ORDER — ACETAMINOPHEN 325 MG/1
650 TABLET ORAL EVERY 6 HOURS PRN
Status: DISCONTINUED | OUTPATIENT
Start: 2024-02-16 | End: 2024-02-21 | Stop reason: HOSPADM

## 2024-02-16 RX ORDER — OXYCODONE HYDROCHLORIDE 5 MG/1
5 TABLET ORAL EVERY 4 HOURS PRN
Status: DISCONTINUED | OUTPATIENT
Start: 2024-02-16 | End: 2024-02-21 | Stop reason: HOSPADM

## 2024-02-16 RX ORDER — SODIUM CHLORIDE 9 MG/ML
INJECTION, SOLUTION INTRAVENOUS PRN
Status: DISCONTINUED | OUTPATIENT
Start: 2024-02-16 | End: 2024-02-21 | Stop reason: HOSPADM

## 2024-02-16 RX ORDER — MAGNESIUM SULFATE IN WATER 40 MG/ML
2000 INJECTION, SOLUTION INTRAVENOUS PRN
Status: DISCONTINUED | OUTPATIENT
Start: 2024-02-16 | End: 2024-02-21 | Stop reason: HOSPADM

## 2024-02-16 RX ORDER — POLYETHYLENE GLYCOL 3350 17 G/17G
17 POWDER, FOR SOLUTION ORAL DAILY PRN
Status: DISCONTINUED | OUTPATIENT
Start: 2024-02-16 | End: 2024-02-21 | Stop reason: HOSPADM

## 2024-02-16 RX ORDER — LISINOPRIL 20 MG/1
20 TABLET ORAL DAILY
Status: DISCONTINUED | OUTPATIENT
Start: 2024-02-16 | End: 2024-02-21 | Stop reason: HOSPADM

## 2024-02-16 RX ORDER — SODIUM CHLORIDE 0.9 % (FLUSH) 0.9 %
10 SYRINGE (ML) INJECTION PRN
Status: DISCONTINUED | OUTPATIENT
Start: 2024-02-16 | End: 2024-02-21 | Stop reason: HOSPADM

## 2024-02-16 RX ORDER — POTASSIUM CHLORIDE 20 MEQ/1
40 TABLET, EXTENDED RELEASE ORAL PRN
Status: DISCONTINUED | OUTPATIENT
Start: 2024-02-16 | End: 2024-02-21 | Stop reason: HOSPADM

## 2024-02-16 RX ORDER — LISINOPRIL AND HYDROCHLOROTHIAZIDE 20; 12.5 MG/1; MG/1
1 TABLET ORAL DAILY
Status: DISCONTINUED | OUTPATIENT
Start: 2024-02-16 | End: 2024-02-16 | Stop reason: RX

## 2024-02-16 RX ORDER — ONDANSETRON 2 MG/ML
4 INJECTION INTRAMUSCULAR; INTRAVENOUS EVERY 6 HOURS PRN
Status: DISCONTINUED | OUTPATIENT
Start: 2024-02-16 | End: 2024-02-21 | Stop reason: HOSPADM

## 2024-02-16 RX ORDER — ENOXAPARIN SODIUM 100 MG/ML
40 INJECTION SUBCUTANEOUS DAILY
Status: DISCONTINUED | OUTPATIENT
Start: 2024-02-16 | End: 2024-02-21 | Stop reason: HOSPADM

## 2024-02-16 RX ORDER — MAGNESIUM SULFATE IN WATER 40 MG/ML
2000 INJECTION, SOLUTION INTRAVENOUS ONCE
Status: COMPLETED | OUTPATIENT
Start: 2024-02-16 | End: 2024-02-16

## 2024-02-16 RX ORDER — POTASSIUM CHLORIDE 7.45 MG/ML
10 INJECTION INTRAVENOUS PRN
Status: DISCONTINUED | OUTPATIENT
Start: 2024-02-16 | End: 2024-02-21 | Stop reason: HOSPADM

## 2024-02-16 RX ORDER — LANOLIN ALCOHOL/MO/W.PET/CERES
100 CREAM (GRAM) TOPICAL DAILY
Status: DISCONTINUED | OUTPATIENT
Start: 2024-02-17 | End: 2024-02-21 | Stop reason: HOSPADM

## 2024-02-16 RX ADMIN — ENOXAPARIN SODIUM 40 MG: 100 INJECTION SUBCUTANEOUS at 12:24

## 2024-02-16 RX ADMIN — LORAZEPAM 2 MG: 2 INJECTION INTRAMUSCULAR; INTRAVENOUS at 07:50

## 2024-02-16 RX ADMIN — LORAZEPAM 2 MG: 2 INJECTION INTRAMUSCULAR; INTRAVENOUS at 18:15

## 2024-02-16 RX ADMIN — SODIUM CHLORIDE: 9 INJECTION, SOLUTION INTRAVENOUS at 12:32

## 2024-02-16 RX ADMIN — LORAZEPAM 2 MG: 1 TABLET ORAL at 06:17

## 2024-02-16 RX ADMIN — SODIUM CHLORIDE, PRESERVATIVE FREE 10 ML: 5 INJECTION INTRAVENOUS at 12:23

## 2024-02-16 RX ADMIN — METOPROLOL TARTRATE 25 MG: 25 TABLET, FILM COATED ORAL at 12:21

## 2024-02-16 RX ADMIN — OXYCODONE HYDROCHLORIDE 5 MG: 5 TABLET ORAL at 12:21

## 2024-02-16 RX ADMIN — HYDRALAZINE HYDROCHLORIDE 5 MG: 20 INJECTION, SOLUTION INTRAMUSCULAR; INTRAVENOUS at 21:15

## 2024-02-16 RX ADMIN — OXYCODONE 5 MG: 5 TABLET ORAL at 06:20

## 2024-02-16 RX ADMIN — Medication 100 MG: at 09:45

## 2024-02-16 RX ADMIN — LORAZEPAM 3 MG: 1 TABLET ORAL at 01:32

## 2024-02-16 RX ADMIN — METOPROLOL TARTRATE 25 MG: 25 TABLET, FILM COATED ORAL at 19:53

## 2024-02-16 RX ADMIN — MAGNESIUM SULFATE HEPTAHYDRATE 2000 MG: 40 INJECTION, SOLUTION INTRAVENOUS at 10:50

## 2024-02-16 RX ADMIN — LORAZEPAM 2 MG: 2 INJECTION INTRAMUSCULAR; INTRAVENOUS at 14:34

## 2024-02-16 RX ADMIN — HYDROCHLOROTHIAZIDE 12.5 MG: 25 TABLET ORAL at 12:21

## 2024-02-16 RX ADMIN — LORAZEPAM 2 MG: 2 INJECTION INTRAMUSCULAR; INTRAVENOUS at 19:54

## 2024-02-16 RX ADMIN — LISINOPRIL 20 MG: 20 TABLET ORAL at 12:21

## 2024-02-16 RX ADMIN — ACETAMINOPHEN 650 MG: 325 TABLET ORAL at 06:19

## 2024-02-16 ASSESSMENT — PAIN DESCRIPTION - ORIENTATION
ORIENTATION: LEFT
ORIENTATION: LEFT

## 2024-02-16 ASSESSMENT — PAIN DESCRIPTION - DESCRIPTORS: DESCRIPTORS: ACHING;DISCOMFORT;SORE

## 2024-02-16 ASSESSMENT — PAIN DESCRIPTION - LOCATION
LOCATION: RIB CAGE
LOCATION: RIB CAGE

## 2024-02-16 ASSESSMENT — PAIN SCALES - GENERAL
PAINLEVEL_OUTOF10: 6
PAINLEVEL_OUTOF10: 8
PAINLEVEL_OUTOF10: 9
PAINLEVEL_OUTOF10: 8

## 2024-02-16 ASSESSMENT — PAIN DESCRIPTION - FREQUENCY: FREQUENCY: CONTINUOUS

## 2024-02-16 ASSESSMENT — PAIN DESCRIPTION - PAIN TYPE: TYPE: ACUTE PAIN

## 2024-02-16 ASSESSMENT — LIFESTYLE VARIABLES: HOW OFTEN DO YOU HAVE A DRINK CONTAINING ALCOHOL: 4 OR MORE TIMES A WEEK

## 2024-02-16 ASSESSMENT — PAIN - FUNCTIONAL ASSESSMENT
PAIN_FUNCTIONAL_ASSESSMENT: 0-10
PAIN_FUNCTIONAL_ASSESSMENT: 0-10

## 2024-02-16 NOTE — CARE COORDINATION
Peer Recovery Support Note    Name: Abbie Amaya  Date: 2/16/2024    Chief Complaint   Patient presents with    Altered Mental Status     + ETOH abuse daily, fell 3 weeks ago rib pain left worse today, denies head trauma - thinners   APS on scene due to poor living conditions        Peer Support met with patient.  [] Support and education provided  [] Resources provided   [] Treatment referral:   [x] Other:   [] Patient declined peer recovery services     Referred By: Tri (DAX)    Notes: Patient was just given comfort meds when peer arrived. She will be medically admitted and Peer will follow up.     Signed: Shana Hyatt, 2/16/2024

## 2024-02-16 NOTE — ED NOTES
2/16/24  9:19 AM EST      Patient presently admitted and boarded in the department pending bed availability.  Intervention required by emergency physician.     Interval HPI: Patient is currently in the ER awaiting disposition for alcohol intoxication had a fall 3 weeks ago had complained of left-sided rib pain has not nondisplaced rib fracture.  During the ED stay patient became more agitated after evaluation by social work.  Patient is currently in CIWA protocol in the ED.  Repeat BMP and mag are pending patient initially had a potassium of 3 and a mag of 1.3. Bedside CIWA:    CIWA Assessment  BP: (!) 152/98  Pulse: 92  Nausea and Vomiting: no nausea and no vomiting  Tactile Disturbances: none  Tremor: 3  Auditory Disturbances: not present  Paroxysmal Sweats: 2  Visual Disturbances: not present  Anxiety: 3  Headache, Fullness in Head: none present  Agitation: normal activity  Orientation and Clouding of Sensorium: disoriented for date by more than 2 calendar days  CIWA-Ar Total: (!) 11     Interval physical exam:     Constitutional/General: Alert and oriented x 2   Head: Normocephalic and atraumatic  Eyes: PERRL, EOMI, sclera non icteric  Mouth: Oropharynx clear, handling secretions, smells of acetone  Neck: Supple, full ROM, no stridor, no meningeal signs  Respiratory: Lungs clear to auscultation bilaterally,Not in respiratory distress    Cardiovascular:  Regular rate. Regular rhythm.  2+ distal pulses. Equal extremity pulses.    GI:  Abdomen Soft, Non tender, Non distended. No rebound, guarding, or rigidity.   Musculoskeletal: Moves all extremities x 4. Warm and well perfused,  Capillary refill <3 seconds  Integument: skin warm and dry. No rashes.   Neurologic: Glascow Coma Scale  Best Eye Response 4 - Opens eyes on own   Best Verbal Response 4 - Seems confused, disoriented   Best Motor Response 6 - Follows simple motor commands   Total 14         Medications   sodium chloride flush 0.9 % injection 5-40 mL 
 consulted peer support.  
Dr. Finney notified of ETOH level 89, okay to discontinue repeat ETOH levels per physician.   
Dr. Finney notified of vitals, CIWA 15, patient's report of left rib pain and request for pain medication, and patient's c/o rectal bleeding during bowel movements and that patient stated she has hemorrhoids.   
Informed by unit clerk patient's brother has been calling. Gave patient message and patient stated she was willing to talk to her brother. Telephone call transferred to telephone in hallway and patient spoke to her brother.   
Nurse to nurse given to Rubina GRANT   
Patient assisted to restroom and back to bed.   
Patient currently alert, oriented to self, place, and situation but confused to time, stating year is 2022. Respirations non-labored, skin warm/dry. Patient c/o ongoing left rib pain after recent fall, requesting pain medication. Patient anxious but cooperative at present. Patient denies SI, HI, or any hallucinations at present. Patient reports rectal bleeding during bowel movements but states she has hemorrhoids. Patient cooperative for vitals.   
Patient fearful over the noises she is hearing in Section G. Patient does not appear to be hallucinating as this RN hears noises as well and explained to patient that the noise she is hearing is coming from the hospital tube system which is very loud right above her room. Patient asked if doors are locked and this RN assured patient doors are locked and she is safe. Patient asked who is taking care of her dogs and this RN explained that I do not know but would assume whoever sent her in to the hospital would have made arrangements for her dogs to be cared for.   
Patient getting of bed, asking for pain pill. Redirected patient back to bed and reminded patient that I already gave her pain pills just over an hour ago.   
Patient is not pink slipped, per charge RN patient moved out to main ED Diamond Bed E.   
Patient reports she lives next to 2 best friends Kana     [phone]    Patient's brother Richie who lives in [place]    [phone]  
Patient was previously brought back to Section G by ED staff, no report received at this time.   
Patient woke up, states she needs to use restroom. Reminded patient that staff will assist her to restroom due to fall risk.   
Pt belongings labeled and stored into Section G locker #29. 2 bags.  
Report given to RUDY Hester.   
Social work was given verbal consent to speak to patient's brother.     received a call from patient's brother inquiring an update.  provided brother an update on patient care.    Patient denies any history of MH and does admit knowingly patient having access to a gun at home for safety.   
This nurse helped ambulate patient to the restroom. Tolerated fairly well.   
protection taken out against you? []  Yes [x]  No  3. Have you ever been arrested due to violence? []  Yes [x]  No  4. Have you ever been cruel to animals? []  Yes [x]  No    After consideration of C-SSRS screening results, C-SSRS assessments, and this professional's assessment the patient's overall suicide risk assessed to be:  [x] No Risk  [] Low   [] Moderate   [] High     [x] Discussed current suicide risk, protective and risk factors with RN and ED Physician.    Consulted with ED Physician. Disposition/level of care recommended at this time:  [] Home:   [] Outpatient Provider:   [] Crisis Unit:   [] Inpatient Psychiatric Unit:  [x] Other:     Patient has no complaints of MH such as SI/HI, acute psychosis or dangerous behaviors. Patient does not meet criteria for inpatient admission at this time.  consulted peer support.

## 2024-02-17 LAB
ALBUMIN SERPL-MCNC: 3.8 G/DL (ref 3.5–5.2)
ALP SERPL-CCNC: 105 U/L (ref 35–104)
ALT SERPL-CCNC: 23 U/L (ref 0–32)
ANION GAP SERPL CALCULATED.3IONS-SCNC: 20 MMOL/L (ref 7–16)
AST SERPL-CCNC: 56 U/L (ref 0–31)
BASOPHILS # BLD: 0.02 K/UL (ref 0–0.2)
BASOPHILS NFR BLD: 0 % (ref 0–2)
BILIRUB SERPL-MCNC: 2.2 MG/DL (ref 0–1.2)
BUN SERPL-MCNC: 6 MG/DL (ref 6–23)
CALCIUM SERPL-MCNC: 9 MG/DL (ref 8.6–10.2)
CHLORIDE SERPL-SCNC: 88 MMOL/L (ref 98–107)
CO2 SERPL-SCNC: 23 MMOL/L (ref 22–29)
CREAT SERPL-MCNC: 0.6 MG/DL (ref 0.5–1)
EOSINOPHIL # BLD: 0.03 K/UL (ref 0.05–0.5)
EOSINOPHILS RELATIVE PERCENT: 1 % (ref 0–6)
ERYTHROCYTE [DISTWIDTH] IN BLOOD BY AUTOMATED COUNT: 12.8 % (ref 11.5–15)
GFR SERPL CREATININE-BSD FRML MDRD: >60 ML/MIN/1.73M2
GLUCOSE SERPL-MCNC: 98 MG/DL (ref 74–99)
HCT VFR BLD AUTO: 41.3 % (ref 34–48)
HGB BLD-MCNC: 14.2 G/DL (ref 11.5–15.5)
IMM GRANULOCYTES # BLD AUTO: 0.03 K/UL (ref 0–0.58)
IMM GRANULOCYTES NFR BLD: 1 % (ref 0–5)
LYMPHOCYTES NFR BLD: 0.41 K/UL (ref 1.5–4)
LYMPHOCYTES RELATIVE PERCENT: 9 % (ref 20–42)
MAGNESIUM SERPL-MCNC: 1.4 MG/DL (ref 1.6–2.6)
MCH RBC QN AUTO: 34 PG (ref 26–35)
MCHC RBC AUTO-ENTMCNC: 34.4 G/DL (ref 32–34.5)
MCV RBC AUTO: 98.8 FL (ref 80–99.9)
MONOCYTES NFR BLD: 0.47 K/UL (ref 0.1–0.95)
MONOCYTES NFR BLD: 10 % (ref 2–12)
NEUTROPHILS NFR BLD: 79 % (ref 43–80)
NEUTS SEG NFR BLD: 3.61 K/UL (ref 1.8–7.3)
PLATELET # BLD AUTO: 156 K/UL (ref 130–450)
PMV BLD AUTO: 9.7 FL (ref 7–12)
POTASSIUM SERPL-SCNC: 3.3 MMOL/L (ref 3.5–5)
PROT SERPL-MCNC: 6.4 G/DL (ref 6.4–8.3)
RBC # BLD AUTO: 4.18 M/UL (ref 3.5–5.5)
RBC # BLD: ABNORMAL 10*6/UL
SODIUM SERPL-SCNC: 131 MMOL/L (ref 132–146)
WBC OTHER # BLD: 4.6 K/UL (ref 4.5–11.5)

## 2024-02-17 PROCEDURE — 36415 COLL VENOUS BLD VENIPUNCTURE: CPT

## 2024-02-17 PROCEDURE — 80053 COMPREHEN METABOLIC PANEL: CPT

## 2024-02-17 PROCEDURE — 6370000000 HC RX 637 (ALT 250 FOR IP): Performed by: EMERGENCY MEDICINE

## 2024-02-17 PROCEDURE — 85025 COMPLETE CBC W/AUTO DIFF WBC: CPT

## 2024-02-17 PROCEDURE — 6370000000 HC RX 637 (ALT 250 FOR IP): Performed by: FAMILY MEDICINE

## 2024-02-17 PROCEDURE — 6360000002 HC RX W HCPCS: Performed by: FAMILY MEDICINE

## 2024-02-17 PROCEDURE — 83735 ASSAY OF MAGNESIUM: CPT

## 2024-02-17 PROCEDURE — 2580000003 HC RX 258: Performed by: EMERGENCY MEDICINE

## 2024-02-17 PROCEDURE — 2580000003 HC RX 258: Performed by: FAMILY MEDICINE

## 2024-02-17 PROCEDURE — 6360000002 HC RX W HCPCS: Performed by: EMERGENCY MEDICINE

## 2024-02-17 PROCEDURE — 1200000000 HC SEMI PRIVATE

## 2024-02-17 RX ADMIN — SODIUM CHLORIDE, PRESERVATIVE FREE 10 ML: 5 INJECTION INTRAVENOUS at 21:07

## 2024-02-17 RX ADMIN — SODIUM CHLORIDE, PRESERVATIVE FREE 10 ML: 5 INJECTION INTRAVENOUS at 21:04

## 2024-02-17 RX ADMIN — LORAZEPAM 4 MG: 2 INJECTION INTRAMUSCULAR; INTRAVENOUS at 02:44

## 2024-02-17 RX ADMIN — LORAZEPAM 3 MG: 2 INJECTION INTRAMUSCULAR; INTRAVENOUS at 15:55

## 2024-02-17 RX ADMIN — LORAZEPAM 3 MG: 2 INJECTION INTRAMUSCULAR; INTRAVENOUS at 12:31

## 2024-02-17 RX ADMIN — OXYCODONE HYDROCHLORIDE 5 MG: 5 TABLET ORAL at 05:33

## 2024-02-17 RX ADMIN — LORAZEPAM 3 MG: 2 INJECTION INTRAMUSCULAR; INTRAVENOUS at 09:34

## 2024-02-17 RX ADMIN — METOPROLOL TARTRATE 25 MG: 25 TABLET, FILM COATED ORAL at 21:03

## 2024-02-17 RX ADMIN — LORAZEPAM 3 MG: 2 INJECTION INTRAMUSCULAR; INTRAVENOUS at 13:34

## 2024-02-17 RX ADMIN — SODIUM CHLORIDE, PRESERVATIVE FREE 10 ML: 5 INJECTION INTRAVENOUS at 09:33

## 2024-02-17 RX ADMIN — SODIUM CHLORIDE: 9 INJECTION, SOLUTION INTRAVENOUS at 20:06

## 2024-02-17 RX ADMIN — ENOXAPARIN SODIUM 40 MG: 100 INJECTION SUBCUTANEOUS at 09:33

## 2024-02-17 RX ADMIN — LORAZEPAM 3 MG: 2 INJECTION INTRAMUSCULAR; INTRAVENOUS at 20:02

## 2024-02-17 RX ADMIN — Medication 100 MG: at 09:33

## 2024-02-17 RX ADMIN — SODIUM CHLORIDE: 9 INJECTION, SOLUTION INTRAVENOUS at 02:35

## 2024-02-17 RX ADMIN — LISINOPRIL 20 MG: 20 TABLET ORAL at 09:33

## 2024-02-17 RX ADMIN — OXYCODONE HYDROCHLORIDE 5 MG: 5 TABLET ORAL at 09:50

## 2024-02-17 RX ADMIN — OXYCODONE HYDROCHLORIDE 5 MG: 5 TABLET ORAL at 14:15

## 2024-02-17 RX ADMIN — METOPROLOL TARTRATE 25 MG: 25 TABLET, FILM COATED ORAL at 09:33

## 2024-02-17 RX ADMIN — HYDROCHLOROTHIAZIDE 12.5 MG: 25 TABLET ORAL at 09:33

## 2024-02-17 RX ADMIN — POTASSIUM BICARBONATE 40 MEQ: 782 TABLET, EFFERVESCENT ORAL at 12:31

## 2024-02-17 ASSESSMENT — PAIN SCALES - GENERAL
PAINLEVEL_OUTOF10: 9
PAINLEVEL_OUTOF10: 8

## 2024-02-17 ASSESSMENT — PAIN DESCRIPTION - LOCATION: LOCATION: RIB CAGE

## 2024-02-17 ASSESSMENT — PAIN DESCRIPTION - ORIENTATION: ORIENTATION: LEFT

## 2024-02-17 ASSESSMENT — PAIN DESCRIPTION - DESCRIPTORS: DESCRIPTORS: THROBBING;SHARP

## 2024-02-17 NOTE — PLAN OF CARE
Problem: Safety - Adult  Goal: Free from fall injury  2/17/2024 1259 by Karen Bennett RN  Outcome: Progressing  2/16/2024 2347 by Gato Olivas RN  Outcome: Progressing  2/16/2024 2347 by Gato Olivas RN  Outcome: Progressing  2/16/2024 2347 by Gato Olivas RN  Outcome: Progressing     Problem: Pain  Goal: Verbalizes/displays adequate comfort level or baseline comfort level  2/17/2024 1259 by Karen Bennett RN  Outcome: Progressing  2/16/2024 2347 by Gato Olivas RN  Outcome: Progressing

## 2024-02-17 NOTE — H&P
Admission:    Medications Prior to Admission: vitamin B-1 (THIAMINE) 100 MG tablet, Take 1 tablet by mouth daily  lidocaine (LIDODERM) 5 %, Place 1 patch onto the skin daily for 10 days 12 hours on, 12 hours off.  traMADol (ULTRAM) 50 MG tablet, Take 1 tablet by mouth daily as needed for Pain for up to 14 days. Intended supply: 3 days. Take lowest dose possible to manage pain Max Daily Amount: 50 mg  vitamin B-12 (CYANOCOBALAMIN) 1000 MCG tablet, Take 1 tablet by mouth daily  lisinopril-hydroCHLOROthiazide (ZESTORETIC) 20-12.5 MG per tablet, Take 1 tablet by mouth daily  metoprolol tartrate (LOPRESSOR) 25 MG tablet, Take 1 tablet by mouth 2 times daily    Allergies:  Patient has no known allergies.    Social History:   TOBACCO:   reports that she quit smoking about 40 years ago. Her smoking use included cigarettes. She started smoking about 48 years ago. She has a 4.0 pack-year smoking history. She has never used smokeless tobacco.  ETOH:   reports that she does not currently use alcohol.  MARITAL STATUS:    OCCUPATION:      Family History:       Problem Relation Age of Onset    Arthritis Mother     Cancer Father         colon?    High Blood Pressure Father     Hypertension Brother        REVIEW OF SYSTEMS:    General ROS: negative  Hematological and Lymphatic ROS: negative  Endocrine ROS: negative  Respiratory ROS: no cough, shortness of breath, or wheezing  Cardiovascular ROS: no chest pain or dyspnea on exertion  Gastrointestinal ROS: no abdominal pain, change in bowel habits, or black or bloody stools  Genito-Urinary ROS: no dysuria, trouble voiding, or hematuria  Neurological ROS: no TIA or stroke symptoms  negative    Vitals:  BP (!) 176/101   Pulse 84   Temp (!) 96.2 °F (35.7 °C) (Temporal)   Resp 18   Ht 1.702 m (5' 7\")   Wt 65.3 kg (144 lb)   SpO2 96%   BMI 22.55 kg/m²     PHYSICAL EXAM:  General:  Awake, alert, oriented X 3.  Well developed, well nourished, well groomed.  No apparent

## 2024-02-17 NOTE — PLAN OF CARE
Problem: Safety - Adult  Goal: Free from fall injury  2/16/2024 2347 by Gato Olivas RN  Outcome: Progressing  2/16/2024 2347 by Gato Olivas RN  Outcome: Progressing  2/16/2024 2347 by Gato Olivas RN  Outcome: Progressing  2/16/2024 1920 by Karen Bennett RN  Outcome: Progressing     Problem: Pain  Goal: Verbalizes/displays adequate comfort level or baseline comfort level  2/16/2024 2347 by Gato Olivas RN  Outcome: Progressing  2/16/2024 1920 by Karen Bennett RN  Outcome: Progressing

## 2024-02-18 LAB
ALBUMIN SERPL-MCNC: 3.5 G/DL (ref 3.5–5.2)
ALP SERPL-CCNC: 96 U/L (ref 35–104)
ALT SERPL-CCNC: 17 U/L (ref 0–32)
ANION GAP SERPL CALCULATED.3IONS-SCNC: 20 MMOL/L (ref 7–16)
AST SERPL-CCNC: 38 U/L (ref 0–31)
BASOPHILS # BLD: 0.02 K/UL (ref 0–0.2)
BASOPHILS NFR BLD: 0 % (ref 0–2)
BILIRUB SERPL-MCNC: 2 MG/DL (ref 0–1.2)
BUN SERPL-MCNC: 6 MG/DL (ref 6–23)
CALCIUM SERPL-MCNC: 8.6 MG/DL (ref 8.6–10.2)
CHLORIDE SERPL-SCNC: 93 MMOL/L (ref 98–107)
CO2 SERPL-SCNC: 20 MMOL/L (ref 22–29)
CREAT SERPL-MCNC: 0.5 MG/DL (ref 0.5–1)
EKG ATRIAL RATE: 79 BPM
EKG P AXIS: 21 DEGREES
EKG P-R INTERVAL: 156 MS
EKG Q-T INTERVAL: 386 MS
EKG QRS DURATION: 84 MS
EKG QTC CALCULATION (BAZETT): 442 MS
EKG R AXIS: 8 DEGREES
EKG T AXIS: 60 DEGREES
EKG VENTRICULAR RATE: 79 BPM
EOSINOPHIL # BLD: 0.06 K/UL (ref 0.05–0.5)
EOSINOPHILS RELATIVE PERCENT: 1 % (ref 0–6)
ERYTHROCYTE [DISTWIDTH] IN BLOOD BY AUTOMATED COUNT: 12.9 % (ref 11.5–15)
GFR SERPL CREATININE-BSD FRML MDRD: >60 ML/MIN/1.73M2
GLUCOSE SERPL-MCNC: 90 MG/DL (ref 74–99)
HCT VFR BLD AUTO: 40.5 % (ref 34–48)
HGB BLD-MCNC: 14.5 G/DL (ref 11.5–15.5)
IMM GRANULOCYTES # BLD AUTO: <0.03 K/UL (ref 0–0.58)
IMM GRANULOCYTES NFR BLD: 0 % (ref 0–5)
LYMPHOCYTES NFR BLD: 0.52 K/UL (ref 1.5–4)
LYMPHOCYTES RELATIVE PERCENT: 10 % (ref 20–42)
MAGNESIUM SERPL-MCNC: 1.1 MG/DL (ref 1.6–2.6)
MCH RBC QN AUTO: 35.5 PG (ref 26–35)
MCHC RBC AUTO-ENTMCNC: 35.8 G/DL (ref 32–34.5)
MCV RBC AUTO: 99 FL (ref 80–99.9)
MONOCYTES NFR BLD: 0.48 K/UL (ref 0.1–0.95)
MONOCYTES NFR BLD: 10 % (ref 2–12)
NEUTROPHILS NFR BLD: 78 % (ref 43–80)
NEUTS SEG NFR BLD: 3.95 K/UL (ref 1.8–7.3)
PLATELET # BLD AUTO: 144 K/UL (ref 130–450)
PMV BLD AUTO: 9.7 FL (ref 7–12)
POTASSIUM SERPL-SCNC: 2.8 MMOL/L (ref 3.5–5)
PROT SERPL-MCNC: 6.1 G/DL (ref 6.4–8.3)
RBC # BLD AUTO: 4.09 M/UL (ref 3.5–5.5)
RBC # BLD: ABNORMAL 10*6/UL
RBC # BLD: ABNORMAL 10*6/UL
SODIUM SERPL-SCNC: 133 MMOL/L (ref 132–146)
WBC OTHER # BLD: 5.1 K/UL (ref 4.5–11.5)

## 2024-02-18 PROCEDURE — 6370000000 HC RX 637 (ALT 250 FOR IP): Performed by: FAMILY MEDICINE

## 2024-02-18 PROCEDURE — 6360000002 HC RX W HCPCS: Performed by: NURSE PRACTITIONER

## 2024-02-18 PROCEDURE — 85025 COMPLETE CBC W/AUTO DIFF WBC: CPT

## 2024-02-18 PROCEDURE — 6360000002 HC RX W HCPCS: Performed by: EMERGENCY MEDICINE

## 2024-02-18 PROCEDURE — 1200000000 HC SEMI PRIVATE

## 2024-02-18 PROCEDURE — 6360000002 HC RX W HCPCS: Performed by: FAMILY MEDICINE

## 2024-02-18 PROCEDURE — 36415 COLL VENOUS BLD VENIPUNCTURE: CPT

## 2024-02-18 PROCEDURE — 2580000003 HC RX 258: Performed by: EMERGENCY MEDICINE

## 2024-02-18 PROCEDURE — 83735 ASSAY OF MAGNESIUM: CPT

## 2024-02-18 PROCEDURE — 93010 ELECTROCARDIOGRAM REPORT: CPT | Performed by: INTERNAL MEDICINE

## 2024-02-18 PROCEDURE — 2580000003 HC RX 258: Performed by: FAMILY MEDICINE

## 2024-02-18 PROCEDURE — 80053 COMPREHEN METABOLIC PANEL: CPT

## 2024-02-18 RX ORDER — MAGNESIUM SULFATE IN WATER 40 MG/ML
2000 INJECTION, SOLUTION INTRAVENOUS ONCE
Status: COMPLETED | OUTPATIENT
Start: 2024-02-18 | End: 2024-02-18

## 2024-02-18 RX ADMIN — LORAZEPAM 3 MG: 2 INJECTION INTRAMUSCULAR; INTRAVENOUS at 10:17

## 2024-02-18 RX ADMIN — ENOXAPARIN SODIUM 40 MG: 100 INJECTION SUBCUTANEOUS at 09:11

## 2024-02-18 RX ADMIN — MAGNESIUM SULFATE IN WATER 2000 MG: 40 INJECTION, SOLUTION INTRAVENOUS at 16:04

## 2024-02-18 RX ADMIN — SODIUM CHLORIDE: 9 INJECTION, SOLUTION INTRAVENOUS at 22:36

## 2024-02-18 RX ADMIN — HYDROCHLOROTHIAZIDE 12.5 MG: 25 TABLET ORAL at 09:11

## 2024-02-18 RX ADMIN — POTASSIUM CHLORIDE 10 MEQ: 7.46 INJECTION, SOLUTION INTRAVENOUS at 11:33

## 2024-02-18 RX ADMIN — LORAZEPAM 2 MG: 2 INJECTION INTRAMUSCULAR; INTRAVENOUS at 22:52

## 2024-02-18 RX ADMIN — LORAZEPAM 3 MG: 2 INJECTION INTRAMUSCULAR; INTRAVENOUS at 16:21

## 2024-02-18 RX ADMIN — HYDRALAZINE HYDROCHLORIDE 5 MG: 20 INJECTION, SOLUTION INTRAMUSCULAR; INTRAVENOUS at 22:41

## 2024-02-18 RX ADMIN — Medication 100 MG: at 09:11

## 2024-02-18 RX ADMIN — LORAZEPAM 3 MG: 2 INJECTION INTRAMUSCULAR; INTRAVENOUS at 09:11

## 2024-02-18 RX ADMIN — LISINOPRIL 20 MG: 20 TABLET ORAL at 09:11

## 2024-02-18 RX ADMIN — POTASSIUM CHLORIDE 10 MEQ: 7.46 INJECTION, SOLUTION INTRAVENOUS at 14:25

## 2024-02-18 RX ADMIN — POTASSIUM CHLORIDE 10 MEQ: 7.46 INJECTION, SOLUTION INTRAVENOUS at 06:48

## 2024-02-18 RX ADMIN — METOPROLOL TARTRATE 25 MG: 25 TABLET, FILM COATED ORAL at 20:49

## 2024-02-18 RX ADMIN — METOPROLOL TARTRATE 25 MG: 25 TABLET, FILM COATED ORAL at 09:11

## 2024-02-18 RX ADMIN — SODIUM CHLORIDE, PRESERVATIVE FREE 10 ML: 5 INJECTION INTRAVENOUS at 09:12

## 2024-02-18 RX ADMIN — SODIUM CHLORIDE, PRESERVATIVE FREE 10 ML: 5 INJECTION INTRAVENOUS at 20:50

## 2024-02-18 RX ADMIN — SODIUM CHLORIDE, PRESERVATIVE FREE 10 ML: 5 INJECTION INTRAVENOUS at 09:11

## 2024-02-18 RX ADMIN — POTASSIUM CHLORIDE 10 MEQ: 7.46 INJECTION, SOLUTION INTRAVENOUS at 09:10

## 2024-02-18 RX ADMIN — POTASSIUM CHLORIDE 10 MEQ: 7.46 INJECTION, SOLUTION INTRAVENOUS at 12:58

## 2024-02-18 RX ADMIN — POTASSIUM CHLORIDE 10 MEQ: 7.46 INJECTION, SOLUTION INTRAVENOUS at 10:11

## 2024-02-18 RX ADMIN — LORAZEPAM 3 MG: 2 INJECTION INTRAMUSCULAR; INTRAVENOUS at 13:01

## 2024-02-18 ASSESSMENT — PAIN DESCRIPTION - LOCATION: LOCATION: SHOULDER

## 2024-02-18 ASSESSMENT — PAIN DESCRIPTION - ORIENTATION: ORIENTATION: LEFT

## 2024-02-18 ASSESSMENT — PAIN SCALES - GENERAL
PAINLEVEL_OUTOF10: 2
PAINLEVEL_OUTOF10: 2

## 2024-02-19 LAB
ALBUMIN SERPL-MCNC: 3.8 G/DL (ref 3.5–5.2)
ALP SERPL-CCNC: 103 U/L (ref 35–104)
ALT SERPL-CCNC: 15 U/L (ref 0–32)
ANION GAP SERPL CALCULATED.3IONS-SCNC: 19 MMOL/L (ref 7–16)
AST SERPL-CCNC: 31 U/L (ref 0–31)
ATYPICAL LYMPHOCYTE ABSOLUTE COUNT: 0.05 K/UL (ref 0–0.46)
ATYPICAL LYMPHOCYTES: 1 % (ref 0–4)
BASOPHILS # BLD: 0 K/UL (ref 0–0.2)
BASOPHILS NFR BLD: 0 % (ref 0–2)
BILIRUB SERPL-MCNC: 2.4 MG/DL (ref 0–1.2)
BUN SERPL-MCNC: 7 MG/DL (ref 6–23)
CALCIUM SERPL-MCNC: 9.1 MG/DL (ref 8.6–10.2)
CHLORIDE SERPL-SCNC: 96 MMOL/L (ref 98–107)
CO2 SERPL-SCNC: 17 MMOL/L (ref 22–29)
CREAT SERPL-MCNC: 0.5 MG/DL (ref 0.5–1)
EOSINOPHIL # BLD: 0 K/UL (ref 0.05–0.5)
EOSINOPHILS RELATIVE PERCENT: 0 % (ref 0–6)
ERYTHROCYTE [DISTWIDTH] IN BLOOD BY AUTOMATED COUNT: 12.8 % (ref 11.5–15)
GFR SERPL CREATININE-BSD FRML MDRD: >60 ML/MIN/1.73M2
GLUCOSE SERPL-MCNC: 101 MG/DL (ref 74–99)
HCT VFR BLD AUTO: 40.3 % (ref 34–48)
HGB BLD-MCNC: 14.2 G/DL (ref 11.5–15.5)
LYMPHOCYTES NFR BLD: 0.37 K/UL (ref 1.5–4)
LYMPHOCYTES RELATIVE PERCENT: 6 % (ref 20–42)
MAGNESIUM SERPL-MCNC: 1.4 MG/DL (ref 1.6–2.6)
MCH RBC QN AUTO: 34.7 PG (ref 26–35)
MCHC RBC AUTO-ENTMCNC: 35.2 G/DL (ref 32–34.5)
MCV RBC AUTO: 98.5 FL (ref 80–99.9)
MONOCYTES NFR BLD: 0.37 K/UL (ref 0.1–0.95)
MONOCYTES NFR BLD: 6 % (ref 2–12)
MYELOCYTES ABSOLUTE COUNT: 0.05 K/UL
MYELOCYTES: 1 %
NEUTROPHILS NFR BLD: 86 % (ref 43–80)
NEUTS SEG NFR BLD: 5.25 K/UL (ref 1.8–7.3)
PLATELET # BLD AUTO: 167 K/UL (ref 130–450)
PMV BLD AUTO: 10.1 FL (ref 7–12)
POTASSIUM SERPL-SCNC: 3.2 MMOL/L (ref 3.5–5)
PROT SERPL-MCNC: 6.7 G/DL (ref 6.4–8.3)
RBC # BLD AUTO: 4.09 M/UL (ref 3.5–5.5)
RBC # BLD: ABNORMAL 10*6/UL
RBC # BLD: ABNORMAL 10*6/UL
SODIUM SERPL-SCNC: 132 MMOL/L (ref 132–146)
WBC OTHER # BLD: 6.1 K/UL (ref 4.5–11.5)

## 2024-02-19 PROCEDURE — 83735 ASSAY OF MAGNESIUM: CPT

## 2024-02-19 PROCEDURE — 97167 OT EVAL HIGH COMPLEX 60 MIN: CPT

## 2024-02-19 PROCEDURE — 97161 PT EVAL LOW COMPLEX 20 MIN: CPT

## 2024-02-19 PROCEDURE — 1200000000 HC SEMI PRIVATE

## 2024-02-19 PROCEDURE — 36415 COLL VENOUS BLD VENIPUNCTURE: CPT

## 2024-02-19 PROCEDURE — 97530 THERAPEUTIC ACTIVITIES: CPT

## 2024-02-19 PROCEDURE — 85025 COMPLETE CBC W/AUTO DIFF WBC: CPT

## 2024-02-19 PROCEDURE — 97535 SELF CARE MNGMENT TRAINING: CPT

## 2024-02-19 PROCEDURE — 80053 COMPREHEN METABOLIC PANEL: CPT

## 2024-02-19 PROCEDURE — 6370000000 HC RX 637 (ALT 250 FOR IP): Performed by: FAMILY MEDICINE

## 2024-02-19 PROCEDURE — 6360000002 HC RX W HCPCS: Performed by: FAMILY MEDICINE

## 2024-02-19 PROCEDURE — 6360000002 HC RX W HCPCS: Performed by: EMERGENCY MEDICINE

## 2024-02-19 PROCEDURE — 6370000000 HC RX 637 (ALT 250 FOR IP): Performed by: EMERGENCY MEDICINE

## 2024-02-19 RX ADMIN — OXYCODONE HYDROCHLORIDE 5 MG: 5 TABLET ORAL at 14:44

## 2024-02-19 RX ADMIN — POTASSIUM BICARBONATE 40 MEQ: 782 TABLET, EFFERVESCENT ORAL at 06:44

## 2024-02-19 RX ADMIN — MAGNESIUM SULFATE HEPTAHYDRATE 2000 MG: 40 INJECTION, SOLUTION INTRAVENOUS at 06:40

## 2024-02-19 RX ADMIN — LISINOPRIL 20 MG: 20 TABLET ORAL at 09:02

## 2024-02-19 RX ADMIN — METOPROLOL TARTRATE 25 MG: 25 TABLET, FILM COATED ORAL at 20:56

## 2024-02-19 RX ADMIN — HYDROCHLOROTHIAZIDE 12.5 MG: 25 TABLET ORAL at 09:02

## 2024-02-19 RX ADMIN — OXYCODONE HYDROCHLORIDE 5 MG: 5 TABLET ORAL at 20:55

## 2024-02-19 RX ADMIN — ENOXAPARIN SODIUM 40 MG: 100 INJECTION SUBCUTANEOUS at 09:02

## 2024-02-19 RX ADMIN — LORAZEPAM 2 MG: 1 TABLET ORAL at 09:04

## 2024-02-19 RX ADMIN — METOPROLOL TARTRATE 25 MG: 25 TABLET, FILM COATED ORAL at 09:02

## 2024-02-19 RX ADMIN — OXYCODONE HYDROCHLORIDE 5 MG: 5 TABLET ORAL at 06:43

## 2024-02-19 RX ADMIN — LORAZEPAM 4 MG: 2 INJECTION INTRAMUSCULAR; INTRAVENOUS at 14:33

## 2024-02-19 RX ADMIN — Medication 100 MG: at 09:02

## 2024-02-19 ASSESSMENT — PAIN DESCRIPTION - DESCRIPTORS: DESCRIPTORS: ACHING

## 2024-02-19 ASSESSMENT — PAIN DESCRIPTION - LOCATION
LOCATION: BACK
LOCATION: RIB CAGE

## 2024-02-19 ASSESSMENT — PAIN DESCRIPTION - ORIENTATION
ORIENTATION: LEFT
ORIENTATION: LEFT

## 2024-02-19 ASSESSMENT — PAIN SCALES - GENERAL
PAINLEVEL_OUTOF10: 6
PAINLEVEL_OUTOF10: 8
PAINLEVEL_OUTOF10: 9

## 2024-02-19 NOTE — CARE COORDINATION
Care coordination:  following for discharge planning.  Chart Reviewed.  Met with patient PT OT scores  13 and 15.  Remains on ETOH withdrawal protocol.  More apert today for this SW.  Met with her and discussed discharge plan.  Recommending placement for rehab therapy as well as support services for addiction.  Suggested [facility].  Patient agreed to referral.  Referral given to Melinda and patient accepted .  She will send for pre cert today.  Destination, transport form, KINDRA and 7000 complete.   Will follow.   1500:  DAX spoke to patient brother Richie Duff .  He lives in [place].  Updated him regarding patient status and the discharge plan.  He is agreeable to placement and relieved she will be getting services.  He asks to be kept informed.  Phone is  [phone]    The Plan for Transition of Care is related to the following treatment goals: ROXANA    The Patient  was provided with a choice of provider and agrees   with the discharge plan. [x] Yes [] No    Freedom of choice list was provided with basic dialogue that supports the patient's individualized plan of care/goals, treatment preferences and shares the quality data associated with the providers. [x] Yes [] No

## 2024-02-19 NOTE — DISCHARGE INSTR - COC
Continuity of Care Form    Patient Name: Abbie Amaya   :  1960  MRN:  [id number]    Admit date:  2/15/2024  Discharge date:  24    Code Status Order: Full Code   Advance Directives:     Admitting Physician:  Tsering Sheldon MD  PCP: William Olea MD    Discharging Nurse: Surinder GRANT  Discharging Hospital Unit/Room#: 8422/8422-B  Discharging Unit Phone Number: [phone]    Emergency Contact:   Extended Emergency Contact Information  Primary Emergency Contact: Richie Duff  Mobile Phone: [phone]  Relation: Brother/Sister  Secondary Emergency Contact: Silvia Rashid  Home Phone: [phone]  Mobile Phone: [phone]  Relation: Brother/Sister    Past Surgical History:  Past Surgical History:   Procedure Laterality Date    BREAST CYST EXCISION      COLONOSCOPY  10/17/2014    repeat 5 years [place] - hemorrhoids sessile polyp    WRIST SURGERY         Immunization History:   Immunization History   Administered Date(s) Administered    COVID-19, MODERNA BLUE border, Primary or Immunocompromised, (age 12y+), IM, 100 mcg/0.5mL 2021, 2021    Influenza Vaccine, unspecified formulation 2015    Influenza Virus Vaccine 2015    Influenza, FLUARIX, FLULAVAL, FLUZONE (age 6 mo+) AND AFLURIA, (age 3 y+), PF, 0.5mL 2022    Influenza, FLUBLOK, (age 18 y+), PF, 0.5mL 2020    Influenza, FLUCELVAX, (age 6 mo+), MDCK, PF, 0.5mL 2023       Active Problems:  Patient Active Problem List   Diagnosis Code    Essential hypertension I10    Generalized anxiety disorder F41.1    Gastroesophageal reflux disease without esophagitis K21.9    H/O alcohol abuse F10.11    Cervical dysplasia N87.9    Alcohol withdrawal delirium (HCC) F10.931       Isolation/Infection:   Isolation            No Isolation          Patient Infection Status       None to display            Nurse Assessment:  Last Vital Signs: BP (!) 167/102   Pulse 95   Temp 97.6 °F (36.4 °C) (Oral)   Resp 18   Ht 1.702 m (5' 7\")

## 2024-02-20 LAB
ANION GAP SERPL CALCULATED.3IONS-SCNC: 16 MMOL/L (ref 7–16)
BASOPHILS # BLD: 0 K/UL (ref 0–0.2)
BASOPHILS NFR BLD: 0 % (ref 0–2)
BUN SERPL-MCNC: 7 MG/DL (ref 6–23)
CALCIUM SERPL-MCNC: 9.4 MG/DL (ref 8.6–10.2)
CHLORIDE SERPL-SCNC: 91 MMOL/L (ref 98–107)
CO2 SERPL-SCNC: 21 MMOL/L (ref 22–29)
CREAT SERPL-MCNC: 0.5 MG/DL (ref 0.5–1)
EOSINOPHIL # BLD: 0 K/UL (ref 0.05–0.5)
EOSINOPHILS RELATIVE PERCENT: 0 % (ref 0–6)
ERYTHROCYTE [DISTWIDTH] IN BLOOD BY AUTOMATED COUNT: 12.8 % (ref 11.5–15)
GFR SERPL CREATININE-BSD FRML MDRD: >60 ML/MIN/1.73M2
GLUCOSE SERPL-MCNC: 98 MG/DL (ref 74–99)
HCT VFR BLD AUTO: 43.5 % (ref 34–48)
HGB BLD-MCNC: 15.3 G/DL (ref 11.5–15.5)
LYMPHOCYTES NFR BLD: 0.67 K/UL (ref 1.5–4)
LYMPHOCYTES RELATIVE PERCENT: 9 % (ref 20–42)
MAGNESIUM SERPL-MCNC: 1.3 MG/DL (ref 1.6–2.6)
MCH RBC QN AUTO: 35.3 PG (ref 26–35)
MCHC RBC AUTO-ENTMCNC: 35.2 G/DL (ref 32–34.5)
MCV RBC AUTO: 100.2 FL (ref 80–99.9)
METAMYELOCYTES ABSOLUTE COUNT: 0.07 K/UL (ref 0–0.12)
METAMYELOCYTES: 1 % (ref 0–1)
MONOCYTES NFR BLD: 0.4 K/UL (ref 0.1–0.95)
MONOCYTES NFR BLD: 5 % (ref 2–12)
NEUTROPHILS NFR BLD: 85 % (ref 43–80)
NEUTS SEG NFR BLD: 6.56 K/UL (ref 1.8–7.3)
PLATELET # BLD AUTO: 207 K/UL (ref 130–450)
PMV BLD AUTO: 10.3 FL (ref 7–12)
POTASSIUM SERPL-SCNC: 3.5 MMOL/L (ref 3.5–5)
RBC # BLD AUTO: 4.34 M/UL (ref 3.5–5.5)
RBC # BLD: ABNORMAL 10*6/UL
SODIUM SERPL-SCNC: 128 MMOL/L (ref 132–146)
WBC OTHER # BLD: 7.7 K/UL (ref 4.5–11.5)

## 2024-02-20 PROCEDURE — 6360000002 HC RX W HCPCS: Performed by: FAMILY MEDICINE

## 2024-02-20 PROCEDURE — 6370000000 HC RX 637 (ALT 250 FOR IP): Performed by: FAMILY MEDICINE

## 2024-02-20 PROCEDURE — 80048 BASIC METABOLIC PNL TOTAL CA: CPT

## 2024-02-20 PROCEDURE — 1200000000 HC SEMI PRIVATE

## 2024-02-20 PROCEDURE — 83735 ASSAY OF MAGNESIUM: CPT

## 2024-02-20 PROCEDURE — 85025 COMPLETE CBC W/AUTO DIFF WBC: CPT

## 2024-02-20 PROCEDURE — 6370000000 HC RX 637 (ALT 250 FOR IP): Performed by: EMERGENCY MEDICINE

## 2024-02-20 PROCEDURE — 36415 COLL VENOUS BLD VENIPUNCTURE: CPT

## 2024-02-20 RX ADMIN — OXYCODONE HYDROCHLORIDE 5 MG: 5 TABLET ORAL at 12:11

## 2024-02-20 RX ADMIN — HYDROCHLOROTHIAZIDE 12.5 MG: 25 TABLET ORAL at 07:42

## 2024-02-20 RX ADMIN — OXYCODONE HYDROCHLORIDE 5 MG: 5 TABLET ORAL at 21:09

## 2024-02-20 RX ADMIN — Medication 100 MG: at 07:42

## 2024-02-20 RX ADMIN — OXYCODONE HYDROCHLORIDE 5 MG: 5 TABLET ORAL at 16:17

## 2024-02-20 RX ADMIN — OXYCODONE HYDROCHLORIDE 5 MG: 5 TABLET ORAL at 07:41

## 2024-02-20 RX ADMIN — METOPROLOL TARTRATE 25 MG: 25 TABLET, FILM COATED ORAL at 21:09

## 2024-02-20 RX ADMIN — METOPROLOL TARTRATE 25 MG: 25 TABLET, FILM COATED ORAL at 07:42

## 2024-02-20 RX ADMIN — LISINOPRIL 20 MG: 20 TABLET ORAL at 07:42

## 2024-02-20 RX ADMIN — ENOXAPARIN SODIUM 40 MG: 100 INJECTION SUBCUTANEOUS at 07:41

## 2024-02-20 ASSESSMENT — PAIN SCALES - GENERAL
PAINLEVEL_OUTOF10: 9
PAINLEVEL_OUTOF10: 1
PAINLEVEL_OUTOF10: 6
PAINLEVEL_OUTOF10: 8
PAINLEVEL_OUTOF10: 7

## 2024-02-20 ASSESSMENT — PAIN DESCRIPTION - ORIENTATION
ORIENTATION: LEFT

## 2024-02-20 ASSESSMENT — PAIN DESCRIPTION - DESCRIPTORS
DESCRIPTORS: ACHING;STABBING;SORE
DESCRIPTORS: ACHING;DULL;SORE

## 2024-02-20 ASSESSMENT — PAIN DESCRIPTION - LOCATION
LOCATION: RIB CAGE
LOCATION: BACK
LOCATION: RIB CAGE

## 2024-02-20 ASSESSMENT — PAIN DESCRIPTION - PAIN TYPE: TYPE: ACUTE PAIN

## 2024-02-20 ASSESSMENT — PAIN DESCRIPTION - FREQUENCY: FREQUENCY: CONTINUOUS

## 2024-02-20 ASSESSMENT — PAIN DESCRIPTION - ONSET: ONSET: ON-GOING

## 2024-02-20 NOTE — PLAN OF CARE
Problem: Safety - Adult  Goal: Free from fall injury  2/20/2024 1053 by Makayla Sim, RN  Outcome: Progressing     Problem: Pain  Goal: Verbalizes/displays adequate comfort level or baseline comfort level  2/20/2024 1053 by Makayla Sim, RN  Outcome: Progressing     Problem: Skin/Tissue Integrity  Goal: Absence of new skin breakdown  Description: 1.  Monitor for areas of redness and/or skin breakdown  2.  Assess vascular access sites hourly  3.  Every 4-6 hours minimum:  Change oxygen saturation probe site  4.  Every 4-6 hours:  If on nasal continuous positive airway pressure, respiratory therapy assess nares and determine need for appliance change or resting period.  2/20/2024 1053 by Makayla Sim, RN  Outcome: Progressing

## 2024-02-20 NOTE — CARE COORDINATION
Care coordination:  Following for discharge planning.  Continues on [facility] protocol.  SW met with patient .  She was more alert. Stated she \"kind of\" remembered discussion about [facility] as d/c plan.  Informed her that I spoke to her brother and family support the plan.  SW again reviewed services offered  at [facility],anticipated LOS and encouraged agreement with plan.  Will continue top provide support. Patient has been accepted.  Pre cert is pending.  All discharge paperwork in place  1337:  Auth approved for [facility].  Good until 2/25 per Melinda.  Informed NP.

## 2024-02-21 VITALS
DIASTOLIC BLOOD PRESSURE: 97 MMHG | BODY MASS INDEX: 22.6 KG/M2 | OXYGEN SATURATION: 96 % | WEIGHT: 144 LBS | SYSTOLIC BLOOD PRESSURE: 155 MMHG | HEART RATE: 90 BPM | TEMPERATURE: 97.6 F | HEIGHT: 67 IN | RESPIRATION RATE: 18 BRPM

## 2024-02-21 PROCEDURE — 6370000000 HC RX 637 (ALT 250 FOR IP): Performed by: EMERGENCY MEDICINE

## 2024-02-21 PROCEDURE — 2580000003 HC RX 258: Performed by: FAMILY MEDICINE

## 2024-02-21 PROCEDURE — 6360000002 HC RX W HCPCS: Performed by: FAMILY MEDICINE

## 2024-02-21 PROCEDURE — 6370000000 HC RX 637 (ALT 250 FOR IP): Performed by: FAMILY MEDICINE

## 2024-02-21 RX ORDER — LIDOCAINE 4 G/G
1 PATCH TOPICAL DAILY
Qty: 30 PATCH | Refills: 0 | Status: SHIPPED | OUTPATIENT
Start: 2024-02-21 | End: 2024-03-22

## 2024-02-21 RX ORDER — LANOLIN ALCOHOL/MO/W.PET/CERES
100 CREAM (GRAM) TOPICAL DAILY
Qty: 30 TABLET | Refills: 3 | Status: SHIPPED | OUTPATIENT
Start: 2024-02-21

## 2024-02-21 RX ORDER — MAGNESIUM SULFATE IN WATER 40 MG/ML
2000 INJECTION, SOLUTION INTRAVENOUS ONCE
Status: COMPLETED | OUTPATIENT
Start: 2024-02-21 | End: 2024-02-21

## 2024-02-21 RX ORDER — FOLIC ACID 1 MG/1
1 TABLET ORAL DAILY
Qty: 90 TABLET | Refills: 1 | Status: SHIPPED | OUTPATIENT
Start: 2024-02-21

## 2024-02-21 RX ORDER — OXYCODONE HYDROCHLORIDE 5 MG/1
5 TABLET ORAL EVERY 4 HOURS PRN
Qty: 18 TABLET | Refills: 0 | Status: SHIPPED | OUTPATIENT
Start: 2024-02-21 | End: 2024-02-24

## 2024-02-21 RX ADMIN — OXYCODONE HYDROCHLORIDE 5 MG: 5 TABLET ORAL at 00:57

## 2024-02-21 RX ADMIN — OXYCODONE HYDROCHLORIDE 5 MG: 5 TABLET ORAL at 10:36

## 2024-02-21 RX ADMIN — METOPROLOL TARTRATE 25 MG: 25 TABLET, FILM COATED ORAL at 10:28

## 2024-02-21 RX ADMIN — ENOXAPARIN SODIUM 40 MG: 100 INJECTION SUBCUTANEOUS at 10:29

## 2024-02-21 RX ADMIN — Medication 100 MG: at 10:29

## 2024-02-21 RX ADMIN — MAGNESIUM SULFATE HEPTAHYDRATE 2000 MG: 40 INJECTION, SOLUTION INTRAVENOUS at 10:42

## 2024-02-21 RX ADMIN — LORAZEPAM 1 MG: 1 TABLET ORAL at 10:35

## 2024-02-21 RX ADMIN — SODIUM CHLORIDE, PRESERVATIVE FREE 10 ML: 5 INJECTION INTRAVENOUS at 10:29

## 2024-02-21 RX ADMIN — HYDROCHLOROTHIAZIDE 12.5 MG: 25 TABLET ORAL at 10:28

## 2024-02-21 RX ADMIN — LISINOPRIL 20 MG: 20 TABLET ORAL at 10:28

## 2024-02-21 ASSESSMENT — PAIN DESCRIPTION - ORIENTATION
ORIENTATION: LEFT
ORIENTATION: LEFT

## 2024-02-21 ASSESSMENT — PAIN SCALES - GENERAL
PAINLEVEL_OUTOF10: 8
PAINLEVEL_OUTOF10: 5
PAINLEVEL_OUTOF10: 0
PAINLEVEL_OUTOF10: 7

## 2024-02-21 ASSESSMENT — PAIN DESCRIPTION - ONSET: ONSET: ON-GOING

## 2024-02-21 ASSESSMENT — PAIN DESCRIPTION - LOCATION
LOCATION: STERNUM
LOCATION: NECK;SHOULDER;RIB CAGE

## 2024-02-21 ASSESSMENT — PAIN DESCRIPTION - FREQUENCY: FREQUENCY: CONTINUOUS

## 2024-02-21 ASSESSMENT — PAIN DESCRIPTION - DESCRIPTORS: DESCRIPTORS: ACHING;DISCOMFORT;SORE

## 2024-02-21 ASSESSMENT — PAIN DESCRIPTION - PAIN TYPE: TYPE: ACUTE PAIN

## 2024-02-21 NOTE — CARE COORDINATION
Social Work/Case Management Transition of Care Planning ([facility] \[facility]\"" [phone]):  Per report and chart review, CIWA protocol remains in place.  PT/OT scores noted to be 13/15.  She was only able to ambulate 3 sidesteps.  Discharge plan is to [facility].  Pre-cert has been approved and is good through 2/25.  KINDRA/destination completed.  7000 completed.  Discharge envelope with ambulette form is in the soft chart.  CESARIO Giraldo  2/21/2024    Update:  Discharge order noted.  Per Lynne of [facility], patient can admit today.  She arranged transport with a 2:30pm  time.  Notified patient and floor nurse.    CESARIO Giraldo  2/21/2024

## 2024-02-21 NOTE — PLAN OF CARE
Problem: Safety - Adult  Goal: Free from fall injury  Outcome: Adequate for Discharge     Problem: Pain  Goal: Verbalizes/displays adequate comfort level or baseline comfort level  Outcome: Adequate for Discharge     Problem: Skin/Tissue Integrity  Goal: Absence of new skin breakdown  Description: 1.  Monitor for areas of redness and/or skin breakdown  2.  Assess vascular access sites hourly  3.  Every 4-6 hours minimum:  Change oxygen saturation probe site  4.  Every 4-6 hours:  If on nasal continuous positive airway pressure, respiratory therapy assess nares and determine need for appliance change or resting period.  Outcome: Adequate for Discharge

## 2024-02-21 NOTE — PROGRESS NOTES
[facility]                                Progress note    Subjective:    Pt still having withdrawal sx, patient is more alert and oriented today.  Patient states she drinks a quart of vodka daily.  Patient has no desire to stop drinking.  Complains of rib pain - medication ordered.    Objective:    BP (!) 169/105   Pulse 91   Temp 97.5 °F (36.4 °C) (Temporal)   Resp 18   Ht 1.702 m (5' 7\")   Wt 65.3 kg (144 lb)   SpO2 96%   BMI 22.55 kg/m²     In: 3764.2 [I.V.:3764.2]  Out: -   In: 3764.2   Out: -     General appearance: NAD  HEENT: AT/NC, MMM  Neck: FROM, supple  Lungs: Clear to auscultation  CV: RRR, no MRGs  Vasc: Radial pulses 2+  Abdomen: Soft, non-tender; no masses or HSM  Extremities: No peripheral edema or digital cyanosis  Skin: no rash, lesions or ulcers  Neuro: no focal deficit  Recent Labs     02/18/24  0538 02/19/24  0457 02/20/24  1103   WBC 5.1 6.1 7.7   HGB 14.5 14.2 15.3   HCT 40.5 40.3 43.5    167 207       Recent Labs     02/18/24  0538 02/19/24  0457 02/20/24  1103    132 128*   K 2.8* 3.2* 3.5   CL 93* 96* 91*   CO2 20* 17* 21*   BUN 6 7 7   CREATININE 0.5 0.5 0.5   CALCIUM 8.6 9.1 9.4       Assessment:    Principal Problem:    Alcohol withdrawal delirium (HCC)  Resolved Problems:    * No resolved hospital problems. *      Plan:       Patient did no meet criteria for inpatient  admission  CIWA  Replace lytes  Zestril  HCTZ  Hydralzine  Metoprolol  Pain management     2/18/2024  Elevated bp - medications ordered with parameters  Continue CIWA scale  Supplement electrolytes  Continue IV hydration    2/19/2024  Continues to utilize CIWA scale  Obvious tremors  Supplement electrolytes  Mag, and potassium  Sitter at bedside  Pain controlled for fx ribs  Encourage ISP  Hopeful to see improvement in the next 24 hours     2/20/2024  Patient continues to utilize CIWA scale  Patient continues with obvious tremors  Sitter continues at bedside  IV hydration-sodium 
    [facility]                                Progress note    Subjective:    Pt still having withdrawal sx,   Complains of rib pain - medication ordered.    Objective:    BP (!) 158/99   Pulse 96   Temp 97.6 °F (36.4 °C) (Oral)   Resp 16   Ht 1.702 m (5' 7\")   Wt 65.3 kg (144 lb)   SpO2 93%   BMI 22.55 kg/m²     In: 2095 [P.O.:360; I.V.:1735]  Out: -   In: 2095   Out: -     General appearance: NAD  HEENT: AT/NC, MMM  Neck: FROM, supple  Lungs: Clear to auscultation  CV: RRR, no MRGs  Vasc: Radial pulses 2+  Abdomen: Soft, non-tender; no masses or HSM  Extremities: No peripheral edema or digital cyanosis  Skin: no rash, lesions or ulcers  Neuro: no focal deficit  Recent Labs     02/17/24  0517 02/18/24  0538 02/19/24  0457   WBC 4.6 5.1 6.1   HGB 14.2 14.5 14.2   HCT 41.3 40.5 40.3    144 167       Recent Labs     02/17/24  0517 02/18/24  0538 02/19/24  0457   * 133 132   K 3.3* 2.8* 3.2*   CL 88* 93* 96*   CO2 23 20* 17*   BUN 6 6 7   CREATININE 0.6 0.5 0.5   CALCIUM 9.0 8.6 9.1       Assessment:    Principal Problem:    Alcohol withdrawal delirium (HCC)  Resolved Problems:    * No resolved hospital problems. *      Plan:       Patient did no meet criteria for inpatient  admission  CIWA  Replace lytes  Zestril  HCTZ  Hydralzine  Metoprolol  Pain management     2/18/2024  Elevated bp - medications ordered with parameters  Continue CIWA scale  Supplement electrolytes  Continue IV hydration    2/19/2024  Continues to utilize CIWA scale  Obvious tremors  Supplement electrolytes  Mag, and potassium  Sitter at bedside  Pain controlled for fx ribs  Encourage ISP  Hopeful to see improvement in the next 24 hours     Medication for other comorbidities continue as appropriate dose adjustment as necessary.        Code status: Full  Consultants:      DVT Prophylaxis   PT/OT  Discharge planning       I have spent a total time of 30 minutes of this patient encounter reviewing chart, labs, 
    [facility]                                Progress note    Subjective:    Pt still having withdrawal sx, taking  off gown. Requesting to drink fluids. C.o rib pain due to fall.       Objective:    BP (!) 173/109   Pulse 91   Temp 97.9 °F (36.6 °C) (Temporal)   Resp 20   Ht 1.702 m (5' 7\")   Wt 65.3 kg (144 lb)   SpO2 95%   BMI 22.55 kg/m²     In: 1708 [P.O.:240; I.V.:1468]  Out: -   In: 1708   Out: -     General appearance: NAD  HEENT: AT/NC, MMM  Neck: FROM, supple  Lungs: Clear to auscultation  CV: RRR, no MRGs  Vasc: Radial pulses 2+  Abdomen: Soft, non-tender; no masses or HSM  Extremities: No peripheral edema or digital cyanosis  Skin: no rash, lesions or ulcers  Neuro: no focal deficit  Recent Labs     02/15/24  1445 02/17/24  0517 02/18/24  0538   WBC 4.2* 4.6 5.1   HGB 15.3 14.2 14.5   HCT 42.7 41.3 40.5    156 144       Recent Labs     02/16/24  0913 02/17/24  0517 02/18/24  0538    131* 133   K 3.4* 3.3* 2.8*   CL 91* 88* 93*   CO2 21* 23 20*   BUN 12 6 6   CREATININE 0.5 0.6 0.5   CALCIUM 8.5* 9.0 8.6       Assessment:    Principal Problem:    Alcohol withdrawal delirium (HCC)  Resolved Problems:    * No resolved hospital problems. *      Plan:       Patient did no meet criteria for inpatient  admission  CIWA  Replace lytes  Zestril  HCTZ  Hydralzine  Metoprolol  Pain management     2/18/2024  Elevated bp - medications ordered with parameters  Continue CIWA scale  Supplement electrolytes  Continue IV hydration    Medication for other comorbidities continue as appropriate dose adjustment as necessary.        Code status: Full  Consultants:      DVT Prophylaxis   PT/OT  Discharge planning       I have spent a total time of 30 minutes of this patient encounter reviewing chart, labs, coordinating care with interdisciplinary teams, face to face encounter with patient, providing counseling/education to patient/family.        Electronically signed by Korin Ray 
4 Eyes Skin Assessment     NAME:  Abbie Amaya  YOB: 1960  MEDICAL RECORD NUMBER:  [id number]    The patient is being assessed for  Admission    I agree that at least one RN has performed a thorough Head to Toe Skin Assessment on the patient. ALL assessment sites listed below have been assessed.      Areas assessed by both nurses:    Head, Face, Ears, Shoulders, Back, Chest, Arms, Elbows, Hands, Sacrum. Buttock, Coccyx, Ischium, and Legs. Feet and Heels        Does the Patient have a Wound? No noted wound(s)bilat feet red but blanchable, abrasions to bue/ble       Joel Prevention initiated by RN: No  Wound Care Orders initiated by RN: No    Pressure Injury (Stage 3,4, Unstageable, DTI, NWPT, and Complex wounds) if present, place Wound referral order by RN under : No    New Ostomies, if present place, Ostomy referral order under : No     Nurse 1 eSignature: Electronically signed by Karen Bennett RN on 2/16/24 at 7:27 PM EST    **SHARE this note so that the co-signing nurse can place an eSignature**    Nurse 2 eSignature: Electronically signed by Nando Menard RN on 2/16/24 at 7:40 PM EST  
Called nurse to nurse to Peyton CARRILLO  
OCCUPATIONAL THERAPY INITIAL EVALUATION    [facility] [address] [place], [place]       Date:2024                                                  Patient Name: Abbei Amaya  MRN: [id number]  : 1960  Room: [address] [place] [address]    Evaluating OT: Derian Ramírez OTR/L [id number]    Referring Provider: Ana Cristina Herron APRN - CNP      Specific Provider Orders/Date: OT evaluation and treatment 24 1130    Diagnosis:  Alcohol withdrawal delirium (HCC) [F10.931]  Hypokalemia [E87.6]  Hypomagnesemia [E83.42]  Alcohol abuse [F10.10]  Acute alcoholic intoxication with complication (HCC) [F10.929]      Pertinent Medical History:  has a past medical history of Anxiety, Cervical dysplasia, Depression, ETOH abuse, GERD (gastroesophageal reflux disease), and Hypertension.   Past Surgical History:   Procedure Laterality Date    BREAST CYST EXCISION      COLONOSCOPY  10/17/2014    repeat 5 years [facility] - hemorrhoids sessile polyp    WRIST SURGERY         Pt admitted to the hospital 2/15 with c/o increased L rib pain   Pt was recently at the hospital 24 and dx with L posterolateral rib fractures 5-9th ribs     Orders received, chart reviewed, eval complete.     Precautions:  Fall Risk, cognition, +alarms,      Assessment of current deficits   [x] Functional mobility   [x]ADLs  [x] Strength               [x]Cognition   [x] Functional transfers   [x] IADLs         [x] Safety Awareness   [x]Endurance   [] Fine Coordination              [x] Balance      [] Vision/perception   []Sensation    []Gross Motor Coordination  [] ROM  [] Delirium                   [] Motor Control     OT PLAN OF CARE   OT POC based on physician orders, patient diagnosis and results of clinical assessment    Frequency/Duration 1-4 days/wk for 2 weeks PRN   Specific OT Treatment to include:   * Instruction/training on adapted ADL techniques and AE recommendations to 
Patient had a fall at approximately 2030 this evening, bed alarm was alarming; by the time I got to room patient was on her knees, pt. States she fell on R shoulder, but didn't hit anything else, vitals stable other than Bp that is elevated. NP notified, nurse supervisor notified, left message for pt. Sister Silvia Rashid about fall, Hydralazine ordered and  XR L shoulder ordered. Patient  obtained as well, will continue to monitor.  
minutes     Emmie Pastor, SPT   Reza Delong PT, DPT   [id number]

## 2024-03-13 ENCOUNTER — APPOINTMENT (OUTPATIENT)
Dept: CT IMAGING | Age: 64
End: 2024-03-13
Payer: MEDICARE

## 2024-03-13 ENCOUNTER — APPOINTMENT (OUTPATIENT)
Dept: GENERAL RADIOLOGY | Age: 64
End: 2024-03-13
Payer: MEDICARE

## 2024-03-13 ENCOUNTER — HOSPITAL ENCOUNTER (INPATIENT)
Age: 64
LOS: 6 days | Discharge: NURSING, REHAB OR LONG-TERM CARE | End: 2024-03-20
Attending: STUDENT IN AN ORGANIZED HEALTH CARE EDUCATION/TRAINING PROGRAM | Admitting: INTERNAL MEDICINE
Payer: MEDICARE

## 2024-03-13 DIAGNOSIS — N17.9 AKI (ACUTE KIDNEY INJURY) (HCC): ICD-10-CM

## 2024-03-13 DIAGNOSIS — I10 ESSENTIAL HYPERTENSION: ICD-10-CM

## 2024-03-13 DIAGNOSIS — E87.1 HYPONATREMIA: Primary | ICD-10-CM

## 2024-03-13 DIAGNOSIS — K64.4 EXTERNAL HEMORRHOID: ICD-10-CM

## 2024-03-13 DIAGNOSIS — F10.931 ALCOHOL WITHDRAWAL DELIRIUM (HCC): ICD-10-CM

## 2024-03-13 LAB
ABO + RH BLD: NORMAL
ALBUMIN SERPL-MCNC: 3.6 G/DL (ref 3.5–5.2)
ALP SERPL-CCNC: 143 U/L (ref 35–104)
ALT SERPL-CCNC: 21 U/L (ref 0–32)
ANION GAP SERPL CALCULATED.3IONS-SCNC: 16 MMOL/L (ref 7–16)
ANION GAP SERPL CALCULATED.3IONS-SCNC: 20 MMOL/L (ref 7–16)
APAP SERPL-MCNC: <5 UG/ML (ref 10–30)
ARM BAND NUMBER: NORMAL
AST SERPL-CCNC: 40 U/L (ref 0–31)
BASOPHILS # BLD: 0.02 K/UL (ref 0–0.2)
BASOPHILS NFR BLD: 0 % (ref 0–2)
BILIRUB SERPL-MCNC: 3.2 MG/DL (ref 0–1.2)
BLOOD BANK SAMPLE EXPIRATION: NORMAL
BLOOD GROUP ANTIBODIES SERPL: NEGATIVE
BNP SERPL-MCNC: 3068 PG/ML (ref 0–125)
BUN SERPL-MCNC: 46 MG/DL (ref 6–23)
BUN SERPL-MCNC: 48 MG/DL (ref 6–23)
CALCIUM SERPL-MCNC: 8.7 MG/DL (ref 8.6–10.2)
CALCIUM SERPL-MCNC: 8.9 MG/DL (ref 8.6–10.2)
CHLORIDE SERPL-SCNC: 80 MMOL/L (ref 98–107)
CHLORIDE SERPL-SCNC: 82 MMOL/L (ref 98–107)
CO2 SERPL-SCNC: 19 MMOL/L (ref 22–29)
CO2 SERPL-SCNC: 20 MMOL/L (ref 22–29)
CREAT SERPL-MCNC: 2.3 MG/DL (ref 0.5–1)
CREAT SERPL-MCNC: 2.6 MG/DL (ref 0.5–1)
EOSINOPHIL # BLD: 0 K/UL (ref 0.05–0.5)
EOSINOPHILS RELATIVE PERCENT: 0 % (ref 0–6)
ERYTHROCYTE [DISTWIDTH] IN BLOOD BY AUTOMATED COUNT: 11.7 % (ref 11.5–15)
ETHANOLAMINE SERPL-MCNC: <10 MG/DL
GFR SERPL CREATININE-BSD FRML MDRD: 20 ML/MIN/1.73M2
GFR SERPL CREATININE-BSD FRML MDRD: 23 ML/MIN/1.73M2
GLUCOSE SERPL-MCNC: 119 MG/DL (ref 74–99)
GLUCOSE SERPL-MCNC: 132 MG/DL (ref 74–99)
HCT VFR BLD AUTO: 33.4 % (ref 34–48)
HCT VFR BLD AUTO: 38.6 % (ref 34–48)
HGB BLD-MCNC: 12.6 G/DL (ref 11.5–15.5)
HGB BLD-MCNC: 14.4 G/DL (ref 11.5–15.5)
IMM GRANULOCYTES # BLD AUTO: 0.11 K/UL (ref 0–0.58)
IMM GRANULOCYTES NFR BLD: 1 % (ref 0–5)
INR PPP: 1.7
LACTATE BLDV-SCNC: 2.3 MMOL/L (ref 0.5–2.2)
LIPASE SERPL-CCNC: 109 U/L (ref 13–60)
LYMPHOCYTES NFR BLD: 0.27 K/UL (ref 1.5–4)
LYMPHOCYTES RELATIVE PERCENT: 2 % (ref 20–42)
MAGNESIUM SERPL-MCNC: 1.4 MG/DL (ref 1.6–2.6)
MCH RBC QN AUTO: 34.1 PG (ref 26–35)
MCHC RBC AUTO-ENTMCNC: 37.3 G/DL (ref 32–34.5)
MCV RBC AUTO: 91.5 FL (ref 80–99.9)
MONOCYTES NFR BLD: 0.9 K/UL (ref 0.1–0.95)
MONOCYTES NFR BLD: 5 % (ref 2–12)
NEUTROPHILS NFR BLD: 92 % (ref 43–80)
NEUTS SEG NFR BLD: 15.52 K/UL (ref 1.8–7.3)
OSMOLALITY SERPL: 259 MOSM/KG (ref 285–310)
PARTIAL THROMBOPLASTIN TIME: 33.3 SEC (ref 24.5–35.1)
PLATELET # BLD AUTO: 281 K/UL (ref 130–450)
PMV BLD AUTO: 10.7 FL (ref 7–12)
POTASSIUM SERPL-SCNC: 2.8 MMOL/L (ref 3.5–5)
POTASSIUM SERPL-SCNC: 3.1 MMOL/L (ref 3.5–5)
PROCALCITONIN SERPL-MCNC: 1.49 NG/ML (ref 0–0.08)
PROT SERPL-MCNC: 6.4 G/DL (ref 6.4–8.3)
PROTHROMBIN TIME: 18.3 SEC (ref 9.3–12.4)
RBC # BLD AUTO: 4.22 M/UL (ref 3.5–5.5)
RBC # BLD: ABNORMAL 10*6/UL
SALICYLATES SERPL-MCNC: <0.3 MG/DL (ref 0–30)
SODIUM SERPL-SCNC: 118 MMOL/L (ref 132–146)
SODIUM SERPL-SCNC: 119 MMOL/L (ref 132–146)
TOXIC TRICYCLIC SC,BLOOD: NEGATIVE
TROPONIN I SERPL HS-MCNC: 72 NG/L (ref 0–9)
TROPONIN I SERPL HS-MCNC: 79 NG/L (ref 0–9)
WBC OTHER # BLD: 16.8 K/UL (ref 4.5–11.5)

## 2024-03-13 PROCEDURE — 84484 ASSAY OF TROPONIN QUANT: CPT

## 2024-03-13 PROCEDURE — C9113 INJ PANTOPRAZOLE SODIUM, VIA: HCPCS | Performed by: STUDENT IN AN ORGANIZED HEALTH CARE EDUCATION/TRAINING PROGRAM

## 2024-03-13 PROCEDURE — 80048 BASIC METABOLIC PNL TOTAL CA: CPT

## 2024-03-13 PROCEDURE — 83605 ASSAY OF LACTIC ACID: CPT

## 2024-03-13 PROCEDURE — 85018 HEMOGLOBIN: CPT

## 2024-03-13 PROCEDURE — 84145 PROCALCITONIN (PCT): CPT

## 2024-03-13 PROCEDURE — 80179 DRUG ASSAY SALICYLATE: CPT

## 2024-03-13 PROCEDURE — 83880 ASSAY OF NATRIURETIC PEPTIDE: CPT

## 2024-03-13 PROCEDURE — 96365 THER/PROPH/DIAG IV INF INIT: CPT

## 2024-03-13 PROCEDURE — 70450 CT HEAD/BRAIN W/O DYE: CPT

## 2024-03-13 PROCEDURE — 96375 TX/PRO/DX INJ NEW DRUG ADDON: CPT

## 2024-03-13 PROCEDURE — 80307 DRUG TEST PRSMV CHEM ANLYZR: CPT

## 2024-03-13 PROCEDURE — 83930 ASSAY OF BLOOD OSMOLALITY: CPT

## 2024-03-13 PROCEDURE — 83735 ASSAY OF MAGNESIUM: CPT

## 2024-03-13 PROCEDURE — 93005 ELECTROCARDIOGRAM TRACING: CPT | Performed by: STUDENT IN AN ORGANIZED HEALTH CARE EDUCATION/TRAINING PROGRAM

## 2024-03-13 PROCEDURE — 86901 BLOOD TYPING SEROLOGIC RH(D): CPT

## 2024-03-13 PROCEDURE — 80143 DRUG ASSAY ACETAMINOPHEN: CPT

## 2024-03-13 PROCEDURE — 80053 COMPREHEN METABOLIC PANEL: CPT

## 2024-03-13 PROCEDURE — 86900 BLOOD TYPING SEROLOGIC ABO: CPT

## 2024-03-13 PROCEDURE — 2580000003 HC RX 258: Performed by: STUDENT IN AN ORGANIZED HEALTH CARE EDUCATION/TRAINING PROGRAM

## 2024-03-13 PROCEDURE — 96366 THER/PROPH/DIAG IV INF ADDON: CPT

## 2024-03-13 PROCEDURE — 99285 EMERGENCY DEPT VISIT HI MDM: CPT

## 2024-03-13 PROCEDURE — 71045 X-RAY EXAM CHEST 1 VIEW: CPT

## 2024-03-13 PROCEDURE — 85730 THROMBOPLASTIN TIME PARTIAL: CPT

## 2024-03-13 PROCEDURE — 85014 HEMATOCRIT: CPT

## 2024-03-13 PROCEDURE — 85610 PROTHROMBIN TIME: CPT

## 2024-03-13 PROCEDURE — 6360000002 HC RX W HCPCS: Performed by: STUDENT IN AN ORGANIZED HEALTH CARE EDUCATION/TRAINING PROGRAM

## 2024-03-13 PROCEDURE — G0480 DRUG TEST DEF 1-7 CLASSES: HCPCS

## 2024-03-13 PROCEDURE — 96367 TX/PROPH/DG ADDL SEQ IV INF: CPT

## 2024-03-13 PROCEDURE — 85025 COMPLETE CBC W/AUTO DIFF WBC: CPT

## 2024-03-13 PROCEDURE — 2500000003 HC RX 250 WO HCPCS: Performed by: STUDENT IN AN ORGANIZED HEALTH CARE EDUCATION/TRAINING PROGRAM

## 2024-03-13 PROCEDURE — 74176 CT ABD & PELVIS W/O CONTRAST: CPT

## 2024-03-13 PROCEDURE — A4216 STERILE WATER/SALINE, 10 ML: HCPCS | Performed by: STUDENT IN AN ORGANIZED HEALTH CARE EDUCATION/TRAINING PROGRAM

## 2024-03-13 PROCEDURE — 83690 ASSAY OF LIPASE: CPT

## 2024-03-13 PROCEDURE — 86850 RBC ANTIBODY SCREEN: CPT

## 2024-03-13 RX ORDER — SODIUM CHLORIDE 0.9 % (FLUSH) 0.9 %
5-40 SYRINGE (ML) INJECTION EVERY 12 HOURS SCHEDULED
Status: DISCONTINUED | OUTPATIENT
Start: 2024-03-13 | End: 2024-03-14

## 2024-03-13 RX ORDER — LORAZEPAM 1 MG/1
3 TABLET ORAL
Status: DISCONTINUED | OUTPATIENT
Start: 2024-03-13 | End: 2024-03-14

## 2024-03-13 RX ORDER — LORAZEPAM 2 MG/ML
1 INJECTION INTRAMUSCULAR
Status: DISCONTINUED | OUTPATIENT
Start: 2024-03-13 | End: 2024-03-14

## 2024-03-13 RX ORDER — LORAZEPAM 1 MG/1
4 TABLET ORAL
Status: DISCONTINUED | OUTPATIENT
Start: 2024-03-13 | End: 2024-03-14

## 2024-03-13 RX ORDER — POTASSIUM CHLORIDE 7.45 MG/ML
10 INJECTION INTRAVENOUS
Status: COMPLETED | OUTPATIENT
Start: 2024-03-13 | End: 2024-03-13

## 2024-03-13 RX ORDER — FOLIC ACID 5 MG/ML
1 INJECTION, SOLUTION INTRAMUSCULAR; INTRAVENOUS; SUBCUTANEOUS ONCE
Status: COMPLETED | OUTPATIENT
Start: 2024-03-13 | End: 2024-03-13

## 2024-03-13 RX ORDER — LORAZEPAM 1 MG/1
2 TABLET ORAL
Status: DISCONTINUED | OUTPATIENT
Start: 2024-03-13 | End: 2024-03-14

## 2024-03-13 RX ORDER — LORAZEPAM 2 MG/ML
4 INJECTION INTRAMUSCULAR
Status: DISCONTINUED | OUTPATIENT
Start: 2024-03-13 | End: 2024-03-14

## 2024-03-13 RX ORDER — LORAZEPAM 2 MG/ML
2 INJECTION INTRAMUSCULAR
Status: DISCONTINUED | OUTPATIENT
Start: 2024-03-13 | End: 2024-03-14

## 2024-03-13 RX ORDER — SODIUM CHLORIDE, SODIUM LACTATE, POTASSIUM CHLORIDE, AND CALCIUM CHLORIDE .6; .31; .03; .02 G/100ML; G/100ML; G/100ML; G/100ML
500 INJECTION, SOLUTION INTRAVENOUS ONCE
Status: COMPLETED | OUTPATIENT
Start: 2024-03-13 | End: 2024-03-13

## 2024-03-13 RX ORDER — SODIUM CHLORIDE 0.9 % (FLUSH) 0.9 %
5-40 SYRINGE (ML) INJECTION PRN
Status: DISCONTINUED | OUTPATIENT
Start: 2024-03-13 | End: 2024-03-14

## 2024-03-13 RX ORDER — SODIUM CHLORIDE 9 MG/ML
INJECTION, SOLUTION INTRAVENOUS PRN
Status: DISCONTINUED | OUTPATIENT
Start: 2024-03-13 | End: 2024-03-20 | Stop reason: HOSPADM

## 2024-03-13 RX ORDER — THIAMINE HYDROCHLORIDE 100 MG/ML
100 INJECTION, SOLUTION INTRAMUSCULAR; INTRAVENOUS ONCE
Status: COMPLETED | OUTPATIENT
Start: 2024-03-13 | End: 2024-03-13

## 2024-03-13 RX ORDER — SERTRALINE HYDROCHLORIDE 25 MG/1
25 TABLET, FILM COATED ORAL DAILY
Status: ON HOLD | COMMUNITY
End: 2024-03-14

## 2024-03-13 RX ORDER — LORAZEPAM 1 MG/1
1 TABLET ORAL
Status: DISCONTINUED | OUTPATIENT
Start: 2024-03-13 | End: 2024-03-14

## 2024-03-13 RX ORDER — LORAZEPAM 2 MG/ML
3 INJECTION INTRAMUSCULAR
Status: DISCONTINUED | OUTPATIENT
Start: 2024-03-13 | End: 2024-03-14

## 2024-03-13 RX ADMIN — VANCOMYCIN HYDROCHLORIDE 1500 MG: 10 INJECTION, POWDER, LYOPHILIZED, FOR SOLUTION INTRAVENOUS at 23:16

## 2024-03-13 RX ADMIN — POTASSIUM CHLORIDE 10 MEQ: 10 INJECTION, SOLUTION INTRAVENOUS at 17:12

## 2024-03-13 RX ADMIN — POTASSIUM CHLORIDE 10 MEQ: 10 INJECTION, SOLUTION INTRAVENOUS at 20:47

## 2024-03-13 RX ADMIN — CEFEPIME 2000 MG: 2 INJECTION, POWDER, FOR SOLUTION INTRAVENOUS at 19:40

## 2024-03-13 RX ADMIN — SODIUM CHLORIDE, POTASSIUM CHLORIDE, SODIUM LACTATE AND CALCIUM CHLORIDE 500 ML: 600; 310; 30; 20 INJECTION, SOLUTION INTRAVENOUS at 16:04

## 2024-03-13 RX ADMIN — THIAMINE HYDROCHLORIDE 100 MG: 100 INJECTION, SOLUTION INTRAMUSCULAR; INTRAVENOUS at 15:55

## 2024-03-13 RX ADMIN — FOLIC ACID 1 MG: 5 INJECTION, SOLUTION INTRAMUSCULAR; INTRAVENOUS; SUBCUTANEOUS at 23:18

## 2024-03-13 RX ADMIN — PANTOPRAZOLE SODIUM 80 MG: 40 INJECTION, POWDER, FOR SOLUTION INTRAVENOUS at 15:57

## 2024-03-13 RX ADMIN — POTASSIUM CHLORIDE 10 MEQ: 10 INJECTION, SOLUTION INTRAVENOUS at 19:35

## 2024-03-13 ASSESSMENT — LIFESTYLE VARIABLES
HOW MANY STANDARD DRINKS CONTAINING ALCOHOL DO YOU HAVE ON A TYPICAL DAY: 10 OR MORE
HOW OFTEN DO YOU HAVE A DRINK CONTAINING ALCOHOL: 4 OR MORE TIMES A WEEK

## 2024-03-13 NOTE — ED PROVIDER NOTES
[facility] EMERGENCY DEPARTMENT  EMERGENCY DEPARTMENT ENCOUNTER    Pt Name: Abbie Amaya  MRN: [id number]  Birthdate 1960  Date of evaluation: 3/13/2024  Provider: Peter Alvarado MD  PCP: William Olea MD  Note Started: 3:17 PM EDT 3/13/24    HPI     Patient is a 63 y.o. female presents with a chief complaint of   Chief Complaint   Patient presents with    GI Problem     Having bright red rectal bleeding that started today. Hypotensive in triage. Patient is from [facility], she is there for ETOH rehab.    .    Patient presented for low blood pressure and elevated heart rate.  Patient reportedly was at a facility for alcohol withdrawal.  Patient is alert and oriented x 1.  Patient did have 1 episode of bright red blood per rectum.  Patient does not have any abdominal pain.  No changes in urinary habits.  Patient does have a history of hemorrhoids.  No nausea or vomiting.  Nursing Notes were all reviewed and agreed with or any disagreements were addressed in the HPI.    History From: Patient    Review of Systems   Pertinent positives and negatives as per HPI.     Physical Exam  Vitals and nursing note reviewed.   Constitutional:       Appearance: She is well-developed.   HENT:      Head: Normocephalic and atraumatic.   Eyes:      Conjunctiva/sclera: Conjunctivae normal.   Cardiovascular:      Rate and Rhythm: Regular rhythm. Tachycardia present.      Heart sounds: Normal heart sounds. No murmur heard.  Pulmonary:      Effort: Pulmonary effort is normal. No respiratory distress.      Breath sounds: Normal breath sounds. No wheezing or rales.   Abdominal:      General: Bowel sounds are normal.      Palpations: Abdomen is soft.      Tenderness: There is no abdominal tenderness. There is no guarding or rebound.   Genitourinary:     Comments: External hemorrhoids noted.  Small amount of bright red blood per rectum.  Musculoskeletal:      Cervical back: Normal range

## 2024-03-14 ENCOUNTER — APPOINTMENT (OUTPATIENT)
Dept: ULTRASOUND IMAGING | Age: 64
End: 2024-03-14
Payer: MEDICARE

## 2024-03-14 ENCOUNTER — APPOINTMENT (OUTPATIENT)
Age: 64
End: 2024-03-14
Payer: MEDICARE

## 2024-03-14 PROBLEM — E87.1 HYPONATREMIA: Status: ACTIVE | Noted: 2024-03-14

## 2024-03-14 LAB
ALBUMIN SERPL-MCNC: 3.3 G/DL (ref 3.5–5.2)
ALBUMIN SERPL-MCNC: 3.6 G/DL (ref 3.5–5.2)
ALP SERPL-CCNC: 110 U/L (ref 35–104)
ALP SERPL-CCNC: 121 U/L (ref 35–104)
ALT SERPL-CCNC: 15 U/L (ref 0–32)
ALT SERPL-CCNC: 17 U/L (ref 0–32)
AMMONIA PLAS-SCNC: 15 UMOL/L (ref 11–51)
ANION GAP SERPL CALCULATED.3IONS-SCNC: 17 MMOL/L (ref 7–16)
ANION GAP SERPL CALCULATED.3IONS-SCNC: 19 MMOL/L (ref 7–16)
ANION GAP SERPL CALCULATED.3IONS-SCNC: 2 MMOL/L (ref 7–16)
AST SERPL-CCNC: 30 U/L (ref 0–31)
AST SERPL-CCNC: 34 U/L (ref 0–31)
B PARAP IS1001 DNA NPH QL NAA+NON-PROBE: NOT DETECTED
B PERT DNA SPEC QL NAA+PROBE: NOT DETECTED
B.E.: -7.7 MMOL/L (ref -3–3)
BACTERIA URNS QL MICRO: ABNORMAL
BASOPHILS # BLD: 0 K/UL (ref 0–0.2)
BASOPHILS NFR BLD: 0 % (ref 0–2)
BILIRUB SERPL-MCNC: 2 MG/DL (ref 0–1.2)
BILIRUB SERPL-MCNC: 2.1 MG/DL (ref 0–1.2)
BILIRUB UR QL STRIP: ABNORMAL
BUN SERPL-MCNC: 38 MG/DL (ref 6–23)
BUN SERPL-MCNC: 46 MG/DL (ref 6–23)
BUN SERPL-MCNC: 48 MG/DL (ref 6–23)
C PNEUM DNA NPH QL NAA+NON-PROBE: NOT DETECTED
CA-I BLD-SCNC: 1.15 MMOL/L (ref 1.15–1.33)
CALCIUM SERPL-MCNC: 8 MG/DL (ref 8.6–10.2)
CALCIUM SERPL-MCNC: 8.5 MG/DL (ref 8.6–10.2)
CALCIUM SERPL-MCNC: 8.8 MG/DL (ref 8.6–10.2)
CASTS #/AREA URNS LPF: ABNORMAL /LPF
CHLORIDE SERPL-SCNC: 103 MMOL/L (ref 98–107)
CHLORIDE SERPL-SCNC: 82 MMOL/L (ref 98–107)
CHLORIDE SERPL-SCNC: 88 MMOL/L (ref 98–107)
CK SERPL-CCNC: 138 U/L (ref 20–180)
CLARITY UR: CLEAR
CO2 SERPL-SCNC: 15 MMOL/L (ref 22–29)
CO2 SERPL-SCNC: 17 MMOL/L (ref 22–29)
CO2 SERPL-SCNC: 17 MMOL/L (ref 22–29)
COHB: 0.7 % (ref 0–1.5)
COLOR UR: YELLOW
CORTIS SERPL-MCNC: 34.3 UG/DL (ref 2.7–18.4)
CORTISOL COLLECTION INFO: ABNORMAL
CREAT SERPL-MCNC: 1.3 MG/DL (ref 0.5–1)
CREAT SERPL-MCNC: 1.8 MG/DL (ref 0.5–1)
CREAT SERPL-MCNC: 2.1 MG/DL (ref 0.5–1)
CREAT UR-MCNC: 111.2 MG/DL (ref 29–226)
CRITICAL: ABNORMAL
DATE ANALYZED: ABNORMAL
DATE OF COLLECTION: ABNORMAL
ECHO AO ASC DIAM: 3.9 CM
ECHO AO ASCENDING AORTA INDEX: 2.36 CM/M2
ECHO AV AREA PEAK VELOCITY: 2.9 CM2
ECHO AV AREA VTI: 2.6 CM2
ECHO AV AREA/BSA PEAK VELOCITY: 1.8 CM2/M2
ECHO AV AREA/BSA VTI: 1.6 CM2/M2
ECHO AV MEAN GRADIENT: 6 MMHG
ECHO AV MEAN VELOCITY: 1.2 M/S
ECHO AV PEAK GRADIENT: 10 MMHG
ECHO AV PEAK VELOCITY: 1.6 M/S
ECHO AV VELOCITY RATIO: 0.88
ECHO AV VTI: 30.2 CM
ECHO BSA: 1.63 M2
ECHO EST RA PRESSURE: 3 MMHG
ECHO LA DIAMETER INDEX: 2.12 CM/M2
ECHO LA DIAMETER: 3.5 CM
ECHO LA VOL A-L A2C: 34 ML (ref 22–52)
ECHO LA VOL A-L A4C: 31 ML (ref 22–52)
ECHO LA VOL MOD A2C: 33 ML (ref 22–52)
ECHO LA VOL MOD A4C: 29 ML (ref 22–52)
ECHO LA VOLUME AREA LENGTH: 33 ML
ECHO LA VOLUME INDEX A-L A2C: 21 ML/M2 (ref 16–34)
ECHO LA VOLUME INDEX A-L A4C: 19 ML/M2 (ref 16–34)
ECHO LA VOLUME INDEX AREA LENGTH: 20 ML/M2 (ref 16–34)
ECHO LA VOLUME INDEX MOD A2C: 20 ML/M2 (ref 16–34)
ECHO LA VOLUME INDEX MOD A4C: 18 ML/M2 (ref 16–34)
ECHO LV FRACTIONAL SHORTENING: 32 % (ref 28–44)
ECHO LV INTERNAL DIMENSION DIASTOLE INDEX: 2.48 CM/M2
ECHO LV INTERNAL DIMENSION DIASTOLIC: 4.1 CM (ref 3.9–5.3)
ECHO LV INTERNAL DIMENSION SYSTOLIC INDEX: 1.7 CM/M2
ECHO LV INTERNAL DIMENSION SYSTOLIC: 2.8 CM
ECHO LV IVSD: 0.9 CM (ref 0.6–0.9)
ECHO LV IVSS: 1 CM
ECHO LV MASS 2D: 114.1 G (ref 67–162)
ECHO LV MASS INDEX 2D: 69.2 G/M2 (ref 43–95)
ECHO LV POSTERIOR WALL DIASTOLIC: 0.9 CM (ref 0.6–0.9)
ECHO LV POSTERIOR WALL SYSTOLIC: 1 CM
ECHO LV RELATIVE WALL THICKNESS RATIO: 0.44
ECHO LVOT AREA: 3.5 CM2
ECHO LVOT AV VTI INDEX: 0.8
ECHO LVOT DIAM: 2.1 CM
ECHO LVOT MEAN GRADIENT: 5 MMHG
ECHO LVOT PEAK GRADIENT: 7 MMHG
ECHO LVOT PEAK VELOCITY: 1.4 M/S
ECHO LVOT STROKE VOLUME INDEX: 50.8 ML/M2
ECHO LVOT SV: 83.8 ML
ECHO LVOT VTI: 24.2 CM
ECHO MV "A" WAVE DURATION: 124.6 MSEC
ECHO MV A VELOCITY: 0.78 M/S
ECHO MV AREA PHT: 2.7 CM2
ECHO MV AREA VTI: 3.4 CM2
ECHO MV E DECELERATION TIME (DT): 333.4 MS
ECHO MV E VELOCITY: 0.74 M/S
ECHO MV E/A RATIO: 0.95
ECHO MV LVOT VTI INDEX: 1.02
ECHO MV MAX VELOCITY: 0.9 M/S
ECHO MV MEAN GRADIENT: 2 MMHG
ECHO MV MEAN VELOCITY: 0.6 M/S
ECHO MV PEAK GRADIENT: 3 MMHG
ECHO MV PRESSURE HALF TIME (PHT): 80.3 MS
ECHO MV VTI: 24.6 CM
ECHO PULMONARY ARTERY END DIASTOLIC PRESSURE: 5 MMHG
ECHO PULMONARY ARTERY SYSTOLIC PRESSURE (PASP): 39 MMHG
ECHO PV MAX VELOCITY: 1.3 M/S
ECHO PV MEAN GRADIENT: 4 MMHG
ECHO PV MEAN VELOCITY: 1 M/S
ECHO PV PEAK GRADIENT: 6 MMHG
ECHO PV REGURGITANT MAX VELOCITY: 1.1 M/S
ECHO PV VTI: 22.4 CM
ECHO PVEIN A DURATION: 107.3 MS
ECHO PVEIN A VELOCITY: 0.5 M/S
ECHO PVEIN PEAK D VELOCITY: 0.4 M/S
ECHO PVEIN PEAK S VELOCITY: 0.7 M/S
ECHO PVEIN S/D RATIO: 1.8
ECHO RIGHT VENTRICULAR SYSTOLIC PRESSURE (RVSP): 39 MMHG
ECHO RV INTERNAL DIMENSION: 2.9 CM
ECHO TV REGURGITANT MAX VELOCITY: 3.01 M/S
ECHO TV REGURGITANT PEAK GRADIENT: 36 MMHG
EKG ATRIAL RATE: 98 BPM
EKG P AXIS: 43 DEGREES
EKG P-R INTERVAL: 190 MS
EKG Q-T INTERVAL: 372 MS
EKG QRS DURATION: 86 MS
EKG QTC CALCULATION (BAZETT): 474 MS
EKG R AXIS: 5 DEGREES
EKG T AXIS: 36 DEGREES
EKG VENTRICULAR RATE: 98 BPM
EOSINOPHIL # BLD: 0 K/UL (ref 0.05–0.5)
EOSINOPHILS RELATIVE PERCENT: 0 % (ref 0–6)
ERYTHROCYTE [DISTWIDTH] IN BLOOD BY AUTOMATED COUNT: 11.7 % (ref 11.5–15)
ERYTHROCYTE [DISTWIDTH] IN BLOOD BY AUTOMATED COUNT: 11.9 % (ref 11.5–15)
FLUAV RNA NPH QL NAA+NON-PROBE: NOT DETECTED
FLUBV RNA NPH QL NAA+NON-PROBE: NOT DETECTED
FOLATE SERPL-MCNC: >20 NG/ML (ref 4.8–24.2)
GFR SERPL CREATININE-BSD FRML MDRD: 26 ML/MIN/1.73M2
GFR SERPL CREATININE-BSD FRML MDRD: 30 ML/MIN/1.73M2
GFR SERPL CREATININE-BSD FRML MDRD: 47 ML/MIN/1.73M2
GLUCOSE SERPL-MCNC: 108 MG/DL (ref 74–99)
GLUCOSE SERPL-MCNC: 112 MG/DL (ref 74–99)
GLUCOSE SERPL-MCNC: 115 MG/DL (ref 74–99)
GLUCOSE UR STRIP-MCNC: NEGATIVE MG/DL
HADV DNA NPH QL NAA+NON-PROBE: NOT DETECTED
HCO3: 15.6 MMOL/L (ref 22–26)
HCOV 229E RNA NPH QL NAA+NON-PROBE: NOT DETECTED
HCOV HKU1 RNA NPH QL NAA+NON-PROBE: NOT DETECTED
HCOV NL63 RNA NPH QL NAA+NON-PROBE: NOT DETECTED
HCOV OC43 RNA NPH QL NAA+NON-PROBE: NOT DETECTED
HCT VFR BLD AUTO: 32.4 % (ref 34–48)
HCT VFR BLD AUTO: 34.7 % (ref 34–48)
HGB BLD-MCNC: 12 G/DL (ref 11.5–15.5)
HGB BLD-MCNC: 12.7 G/DL (ref 11.5–15.5)
HGB UR QL STRIP.AUTO: ABNORMAL
HHB: 7.9 % (ref 0–5)
HMPV RNA NPH QL NAA+NON-PROBE: NOT DETECTED
HPIV1 RNA NPH QL NAA+NON-PROBE: NOT DETECTED
HPIV2 RNA NPH QL NAA+NON-PROBE: NOT DETECTED
HPIV3 RNA NPH QL NAA+NON-PROBE: NOT DETECTED
HPIV4 RNA NPH QL NAA+NON-PROBE: NOT DETECTED
KETONES UR STRIP-MCNC: NEGATIVE MG/DL
L PNEUMO1 AG UR QL IA.RAPID: NEGATIVE
LAB: ABNORMAL
LACTATE BLDV-SCNC: 0.9 MMOL/L (ref 0.5–2.2)
LEUKOCYTE ESTERASE UR QL STRIP: NEGATIVE
LYMPHOCYTES NFR BLD: 0.42 K/UL (ref 1.5–4)
LYMPHOCYTES RELATIVE PERCENT: 5 % (ref 20–42)
Lab: 1005
M PNEUMO DNA NPH QL NAA+NON-PROBE: NOT DETECTED
MAGNESIUM SERPL-MCNC: 2.9 MG/DL (ref 1.6–2.6)
MCH RBC QN AUTO: 33.7 PG (ref 26–35)
MCH RBC QN AUTO: 34 PG (ref 26–35)
MCHC RBC AUTO-ENTMCNC: 36.6 G/DL (ref 32–34.5)
MCHC RBC AUTO-ENTMCNC: 37 G/DL (ref 32–34.5)
MCV RBC AUTO: 91.8 FL (ref 80–99.9)
MCV RBC AUTO: 92 FL (ref 80–99.9)
METHB: 0.4 % (ref 0–1.5)
MODE: ABNORMAL
MONOCYTES NFR BLD: 0.14 K/UL (ref 0.1–0.95)
MONOCYTES NFR BLD: 2 % (ref 2–12)
MUCOUS THREADS URNS QL MICRO: PRESENT
NEUTROPHILS NFR BLD: 93 % (ref 43–80)
NEUTS SEG NFR BLD: 7.54 K/UL (ref 1.8–7.3)
NITRITE UR QL STRIP: NEGATIVE
O2 CONTENT: 16.6 ML/DL
O2 SATURATION: 92 % (ref 92–98.5)
O2HB: 91 % (ref 94–97)
OPERATOR ID: 7291
OSMOLALITY UR: 321 MOSM/KG (ref 300–900)
PATIENT TEMP: 37 C
PCO2: 26.1 MMHG (ref 35–45)
PH BLOOD GAS: 7.39 (ref 7.35–7.45)
PH UR STRIP: 6 [PH] (ref 5–9)
PHOSPHATE SERPL-MCNC: 3.8 MG/DL (ref 2.5–4.5)
PLATELET # BLD AUTO: 212 K/UL (ref 130–450)
PLATELET # BLD AUTO: 245 K/UL (ref 130–450)
PMV BLD AUTO: 10 FL (ref 7–12)
PMV BLD AUTO: 10.5 FL (ref 7–12)
PO2: 63.4 MMHG (ref 75–100)
POTASSIUM SERPL-SCNC: 2.6 MMOL/L (ref 3.5–5)
POTASSIUM SERPL-SCNC: 3.1 MMOL/L (ref 3.5–5)
POTASSIUM SERPL-SCNC: 3.6 MMOL/L (ref 3.5–5)
POTASSIUM, UR: 32.1 MMOL/L
PROT SERPL-MCNC: 5.8 G/DL (ref 6.4–8.3)
PROT SERPL-MCNC: 6.1 G/DL (ref 6.4–8.3)
PROT UR STRIP-MCNC: ABNORMAL MG/DL
RBC # BLD AUTO: 3.53 M/UL (ref 3.5–5.5)
RBC # BLD AUTO: 3.77 M/UL (ref 3.5–5.5)
RBC # BLD: ABNORMAL 10*6/UL
RBC #/AREA URNS HPF: ABNORMAL /HPF
RSV RNA NPH QL NAA+NON-PROBE: NOT DETECTED
RV+EV RNA NPH QL NAA+NON-PROBE: NOT DETECTED
S PNEUM AG SPEC QL: NEGATIVE
SARS-COV-2 RNA NPH QL NAA+NON-PROBE: NOT DETECTED
SODIUM SERPL-SCNC: 118 MMOL/L (ref 132–146)
SODIUM SERPL-SCNC: 120 MMOL/L (ref 132–146)
SODIUM SERPL-SCNC: 122 MMOL/L (ref 132–146)
SODIUM UR-SCNC: 25 MMOL/L
SOURCE, BLOOD GAS: ABNORMAL
SP GR UR STRIP: 1.02 (ref 1–1.03)
SPECIMEN DESCRIPTION: NORMAL
SPECIMEN SOURCE: NORMAL
T4 FREE SERPL-MCNC: 1.8 NG/DL (ref 0.9–1.7)
THB: 13 G/DL (ref 11.5–16.5)
TIME ANALYZED: 1018
TROPONIN I SERPL HS-MCNC: 51 NG/L (ref 0–9)
TSH SERPL DL<=0.05 MIU/L-ACNC: 0.22 UIU/ML (ref 0.27–4.2)
URATE SERPL-MCNC: 8.1 MG/DL (ref 2.4–5.7)
UROBILINOGEN UR STRIP-ACNC: 0.2 EU/DL (ref 0–1)
UUN UR-MCNC: 385 MG/DL (ref 800–1666)
UUN UR-MCNC: 411 MG/DL (ref 800–1666)
VANCOMYCIN SERPL-MCNC: 21.5 UG/ML (ref 5–40)
VIT B12 SERPL-MCNC: 694 PG/ML (ref 211–946)
WBC #/AREA URNS HPF: ABNORMAL /HPF
WBC OTHER # BLD: 11.7 K/UL (ref 4.5–11.5)
WBC OTHER # BLD: 8.1 K/UL (ref 4.5–11.5)

## 2024-03-14 PROCEDURE — 82746 ASSAY OF FOLIC ACID SERUM: CPT

## 2024-03-14 PROCEDURE — 2580000003 HC RX 258: Performed by: INTERNAL MEDICINE

## 2024-03-14 PROCEDURE — 87449 NOS EACH ORGANISM AG IA: CPT

## 2024-03-14 PROCEDURE — 80053 COMPREHEN METABOLIC PANEL: CPT

## 2024-03-14 PROCEDURE — 85027 COMPLETE CBC AUTOMATED: CPT

## 2024-03-14 PROCEDURE — 80048 BASIC METABOLIC PNL TOTAL CA: CPT

## 2024-03-14 PROCEDURE — 97530 THERAPEUTIC ACTIVITIES: CPT

## 2024-03-14 PROCEDURE — 2580000003 HC RX 258

## 2024-03-14 PROCEDURE — 76705 ECHO EXAM OF ABDOMEN: CPT

## 2024-03-14 PROCEDURE — 6360000002 HC RX W HCPCS: Performed by: INTERNAL MEDICINE

## 2024-03-14 PROCEDURE — 99232 SBSQ HOSP IP/OBS MODERATE 35: CPT | Performed by: INTERNAL MEDICINE

## 2024-03-14 PROCEDURE — 6370000000 HC RX 637 (ALT 250 FOR IP)

## 2024-03-14 PROCEDURE — 81001 URINALYSIS AUTO W/SCOPE: CPT

## 2024-03-14 PROCEDURE — 2000000000 HC ICU R&B

## 2024-03-14 PROCEDURE — 2580000003 HC RX 258: Performed by: STUDENT IN AN ORGANIZED HEALTH CARE EDUCATION/TRAINING PROGRAM

## 2024-03-14 PROCEDURE — 83935 ASSAY OF URINE OSMOLALITY: CPT

## 2024-03-14 PROCEDURE — 84133 ASSAY OF URINE POTASSIUM: CPT

## 2024-03-14 PROCEDURE — 0202U NFCT DS 22 TRGT SARS-COV-2: CPT

## 2024-03-14 PROCEDURE — 82533 TOTAL CORTISOL: CPT

## 2024-03-14 PROCEDURE — 93010 ELECTROCARDIOGRAM REPORT: CPT | Performed by: INTERNAL MEDICINE

## 2024-03-14 PROCEDURE — 84443 ASSAY THYROID STIM HORMONE: CPT

## 2024-03-14 PROCEDURE — 84100 ASSAY OF PHOSPHORUS: CPT

## 2024-03-14 PROCEDURE — 76770 US EXAM ABDO BACK WALL COMP: CPT

## 2024-03-14 PROCEDURE — 82140 ASSAY OF AMMONIA: CPT

## 2024-03-14 PROCEDURE — 97535 SELF CARE MNGMENT TRAINING: CPT

## 2024-03-14 PROCEDURE — 93306 TTE W/DOPPLER COMPLETE: CPT

## 2024-03-14 PROCEDURE — 84439 ASSAY OF FREE THYROXINE: CPT

## 2024-03-14 PROCEDURE — 82805 BLOOD GASES W/O2 SATURATION: CPT

## 2024-03-14 PROCEDURE — 6360000002 HC RX W HCPCS: Performed by: STUDENT IN AN ORGANIZED HEALTH CARE EDUCATION/TRAINING PROGRAM

## 2024-03-14 PROCEDURE — 51702 INSERT TEMP BLADDER CATH: CPT

## 2024-03-14 PROCEDURE — 82570 ASSAY OF URINE CREATININE: CPT

## 2024-03-14 PROCEDURE — 84540 ASSAY OF URINE/UREA-N: CPT

## 2024-03-14 PROCEDURE — P9047 ALBUMIN (HUMAN), 25%, 50ML: HCPCS

## 2024-03-14 PROCEDURE — 83605 ASSAY OF LACTIC ACID: CPT

## 2024-03-14 PROCEDURE — 36415 COLL VENOUS BLD VENIPUNCTURE: CPT

## 2024-03-14 PROCEDURE — 87040 BLOOD CULTURE FOR BACTERIA: CPT

## 2024-03-14 PROCEDURE — 87899 AGENT NOS ASSAY W/OPTIC: CPT

## 2024-03-14 PROCEDURE — 84484 ASSAY OF TROPONIN QUANT: CPT

## 2024-03-14 PROCEDURE — 87086 URINE CULTURE/COLONY COUNT: CPT

## 2024-03-14 PROCEDURE — 84300 ASSAY OF URINE SODIUM: CPT

## 2024-03-14 PROCEDURE — 82607 VITAMIN B-12: CPT

## 2024-03-14 PROCEDURE — 80202 ASSAY OF VANCOMYCIN: CPT

## 2024-03-14 PROCEDURE — 93306 TTE W/DOPPLER COMPLETE: CPT | Performed by: INTERNAL MEDICINE

## 2024-03-14 PROCEDURE — 82330 ASSAY OF CALCIUM: CPT

## 2024-03-14 PROCEDURE — 2700000000 HC OXYGEN THERAPY PER DAY

## 2024-03-14 PROCEDURE — 85025 COMPLETE CBC W/AUTO DIFF WBC: CPT

## 2024-03-14 PROCEDURE — 83735 ASSAY OF MAGNESIUM: CPT

## 2024-03-14 PROCEDURE — 6360000002 HC RX W HCPCS

## 2024-03-14 PROCEDURE — 97166 OT EVAL MOD COMPLEX 45 MIN: CPT

## 2024-03-14 PROCEDURE — 2500000003 HC RX 250 WO HCPCS

## 2024-03-14 PROCEDURE — 87081 CULTURE SCREEN ONLY: CPT

## 2024-03-14 PROCEDURE — 84550 ASSAY OF BLOOD/URIC ACID: CPT

## 2024-03-14 PROCEDURE — P9045 ALBUMIN (HUMAN), 5%, 250 ML: HCPCS | Performed by: INTERNAL MEDICINE

## 2024-03-14 PROCEDURE — 82550 ASSAY OF CK (CPK): CPT

## 2024-03-14 RX ORDER — HYDROCHLOROTHIAZIDE 12.5 MG/1
12.5 TABLET ORAL DAILY
Status: DISCONTINUED | OUTPATIENT
Start: 2024-03-14 | End: 2024-03-14

## 2024-03-14 RX ORDER — ENOXAPARIN SODIUM 100 MG/ML
30 INJECTION SUBCUTANEOUS DAILY
Status: DISCONTINUED | OUTPATIENT
Start: 2024-03-14 | End: 2024-03-14

## 2024-03-14 RX ORDER — LISINOPRIL 20 MG/1
20 TABLET ORAL DAILY
Status: DISCONTINUED | OUTPATIENT
Start: 2024-03-14 | End: 2024-03-14

## 2024-03-14 RX ORDER — SODIUM BICARBONATE 650 MG/1
1300 TABLET ORAL 3 TIMES DAILY
Status: DISCONTINUED | OUTPATIENT
Start: 2024-03-14 | End: 2024-03-16

## 2024-03-14 RX ORDER — ALBUMIN, HUMAN INJ 5% 5 %
25 SOLUTION INTRAVENOUS EVERY 8 HOURS
Status: COMPLETED | OUTPATIENT
Start: 2024-03-14 | End: 2024-03-15

## 2024-03-14 RX ORDER — ONDANSETRON 2 MG/ML
4 INJECTION INTRAMUSCULAR; INTRAVENOUS EVERY 6 HOURS PRN
Status: DISCONTINUED | OUTPATIENT
Start: 2024-03-14 | End: 2024-03-20 | Stop reason: HOSPADM

## 2024-03-14 RX ORDER — SODIUM CHLORIDE 0.9 % (FLUSH) 0.9 %
5-40 SYRINGE (ML) INJECTION PRN
Status: DISCONTINUED | OUTPATIENT
Start: 2024-03-14 | End: 2024-03-20 | Stop reason: HOSPADM

## 2024-03-14 RX ORDER — ACETAMINOPHEN 325 MG/1
650 TABLET ORAL EVERY 6 HOURS PRN
Status: DISCONTINUED | OUTPATIENT
Start: 2024-03-14 | End: 2024-03-20 | Stop reason: HOSPADM

## 2024-03-14 RX ORDER — SODIUM CHLORIDE 0.9 % (FLUSH) 0.9 %
5-40 SYRINGE (ML) INJECTION EVERY 12 HOURS SCHEDULED
Status: DISCONTINUED | OUTPATIENT
Start: 2024-03-14 | End: 2024-03-20 | Stop reason: HOSPADM

## 2024-03-14 RX ORDER — LANOLIN ALCOHOL/MO/W.PET/CERES
100 CREAM (GRAM) TOPICAL DAILY
Status: DISCONTINUED | OUTPATIENT
Start: 2024-03-14 | End: 2024-03-15

## 2024-03-14 RX ORDER — DEXTROSE MONOHYDRATE, SODIUM CHLORIDE, AND POTASSIUM CHLORIDE 50; 1.49; 9 G/1000ML; G/1000ML; G/1000ML
INJECTION, SOLUTION INTRAVENOUS CONTINUOUS
Status: DISCONTINUED | OUTPATIENT
Start: 2024-03-14 | End: 2024-03-14

## 2024-03-14 RX ORDER — SODIUM CHLORIDE 9 MG/ML
INJECTION, SOLUTION INTRAVENOUS PRN
Status: DISCONTINUED | OUTPATIENT
Start: 2024-03-14 | End: 2024-03-14

## 2024-03-14 RX ORDER — ONDANSETRON 4 MG/1
4 TABLET, ORALLY DISINTEGRATING ORAL EVERY 8 HOURS PRN
Status: DISCONTINUED | OUTPATIENT
Start: 2024-03-14 | End: 2024-03-20 | Stop reason: HOSPADM

## 2024-03-14 RX ORDER — BENZOCAINE/MENTHOL 6 MG-10 MG
LOZENGE MUCOUS MEMBRANE 2 TIMES DAILY
Status: DISCONTINUED | OUTPATIENT
Start: 2024-03-14 | End: 2024-03-15

## 2024-03-14 RX ORDER — ACETAMINOPHEN 650 MG/1
650 SUPPOSITORY RECTAL EVERY 6 HOURS PRN
Status: DISCONTINUED | OUTPATIENT
Start: 2024-03-14 | End: 2024-03-20 | Stop reason: HOSPADM

## 2024-03-14 RX ORDER — FOLIC ACID 1 MG/1
1 TABLET ORAL DAILY
Status: DISCONTINUED | OUTPATIENT
Start: 2024-03-14 | End: 2024-03-20 | Stop reason: HOSPADM

## 2024-03-14 RX ORDER — PETROLATUM 42 G/100G
OINTMENT TOPICAL 2 TIMES DAILY PRN
Status: DISCONTINUED | OUTPATIENT
Start: 2024-03-14 | End: 2024-03-20 | Stop reason: HOSPADM

## 2024-03-14 RX ORDER — POTASSIUM CHLORIDE 20 MEQ/1
40 TABLET, EXTENDED RELEASE ORAL ONCE
Status: COMPLETED | OUTPATIENT
Start: 2024-03-14 | End: 2024-03-14

## 2024-03-14 RX ORDER — NOREPINEPHRINE BITARTRATE 0.06 MG/ML
1-100 INJECTION, SOLUTION INTRAVENOUS CONTINUOUS
Status: DISCONTINUED | OUTPATIENT
Start: 2024-03-14 | End: 2024-03-15

## 2024-03-14 RX ORDER — SODIUM BICARBONATE 650 MG/1
650 TABLET ORAL 3 TIMES DAILY
Status: DISCONTINUED | OUTPATIENT
Start: 2024-03-14 | End: 2024-03-14

## 2024-03-14 RX ORDER — DIMETHICONE, OXYBENZONE, AND PADIMATE O 2; 2.5; 6.6 G/100G; G/100G; G/100G
STICK TOPICAL PRN
Status: DISCONTINUED | OUTPATIENT
Start: 2024-03-14 | End: 2024-03-20 | Stop reason: HOSPADM

## 2024-03-14 RX ORDER — ESCITALOPRAM OXALATE 10 MG/1
10 TABLET ORAL DAILY
Status: ON HOLD | COMMUNITY
End: 2024-03-14

## 2024-03-14 RX ORDER — SERTRALINE HYDROCHLORIDE 25 MG/1
25 TABLET, FILM COATED ORAL DAILY
COMMUNITY

## 2024-03-14 RX ORDER — 0.9 % SODIUM CHLORIDE 0.9 %
500 INTRAVENOUS SOLUTION INTRAVENOUS ONCE
Status: COMPLETED | OUTPATIENT
Start: 2024-03-14 | End: 2024-03-14

## 2024-03-14 RX ORDER — 3% SODIUM CHLORIDE 3 G/100ML
25 INJECTION, SOLUTION INTRAVENOUS CONTINUOUS
Status: DISCONTINUED | OUTPATIENT
Start: 2024-03-14 | End: 2024-03-14

## 2024-03-14 RX ORDER — POTASSIUM CHLORIDE 7.45 MG/ML
10 INJECTION INTRAVENOUS
Status: COMPLETED | OUTPATIENT
Start: 2024-03-14 | End: 2024-03-14

## 2024-03-14 RX ORDER — HEPARIN SODIUM 10000 [USP'U]/ML
5000 INJECTION, SOLUTION INTRAVENOUS; SUBCUTANEOUS EVERY 8 HOURS
Status: DISCONTINUED | OUTPATIENT
Start: 2024-03-14 | End: 2024-03-20 | Stop reason: HOSPADM

## 2024-03-14 RX ORDER — MAGNESIUM SULFATE IN WATER 40 MG/ML
2000 INJECTION, SOLUTION INTRAVENOUS ONCE
Status: COMPLETED | OUTPATIENT
Start: 2024-03-14 | End: 2024-03-14

## 2024-03-14 RX ORDER — 3% SODIUM CHLORIDE 3 G/100ML
25 INJECTION, SOLUTION INTRAVENOUS CONTINUOUS
Status: DISPENSED | OUTPATIENT
Start: 2024-03-14 | End: 2024-03-14

## 2024-03-14 RX ORDER — ALBUMIN (HUMAN) 12.5 G/50ML
25 SOLUTION INTRAVENOUS ONCE
Status: COMPLETED | OUTPATIENT
Start: 2024-03-14 | End: 2024-03-14

## 2024-03-14 RX ORDER — LISINOPRIL AND HYDROCHLOROTHIAZIDE 20; 12.5 MG/1; MG/1
1 TABLET ORAL DAILY
Status: DISCONTINUED | OUTPATIENT
Start: 2024-03-14 | End: 2024-03-14 | Stop reason: CLARIF

## 2024-03-14 RX ADMIN — SODIUM BICARBONATE 1300 MG: 650 TABLET ORAL at 21:04

## 2024-03-14 RX ADMIN — SODIUM CHLORIDE, PRESERVATIVE FREE 10 ML: 5 INJECTION INTRAVENOUS at 09:57

## 2024-03-14 RX ADMIN — Medication 2 MCG/MIN: at 04:20

## 2024-03-14 RX ADMIN — HYDROCORTISONE: 1 CREAM TOPICAL at 21:04

## 2024-03-14 RX ADMIN — POTASSIUM CHLORIDE 10 MEQ: 7.46 INJECTION, SOLUTION INTRAVENOUS at 06:54

## 2024-03-14 RX ADMIN — HEPARIN SODIUM 5000 UNITS: 10000 INJECTION INTRAVENOUS; SUBCUTANEOUS at 15:57

## 2024-03-14 RX ADMIN — CEFTRIAXONE SODIUM 1000 MG: 1 INJECTION, POWDER, FOR SOLUTION INTRAMUSCULAR; INTRAVENOUS at 12:25

## 2024-03-14 RX ADMIN — POTASSIUM CHLORIDE 10 MEQ: 7.46 INJECTION, SOLUTION INTRAVENOUS at 05:46

## 2024-03-14 RX ADMIN — POTASSIUM CHLORIDE 40 MEQ: 1500 TABLET, EXTENDED RELEASE ORAL at 04:50

## 2024-03-14 RX ADMIN — SODIUM CHLORIDE 500 ML: 9 INJECTION, SOLUTION INTRAVENOUS at 06:50

## 2024-03-14 RX ADMIN — SODIUM BICARBONATE 650 MG: 650 TABLET ORAL at 10:23

## 2024-03-14 RX ADMIN — POTASSIUM CHLORIDE 40 MEQ: 1500 TABLET, EXTENDED RELEASE ORAL at 21:04

## 2024-03-14 RX ADMIN — Medication 100 MG: at 09:56

## 2024-03-14 RX ADMIN — ALBUMIN (HUMAN) 25 G: 0.25 INJECTION, SOLUTION INTRAVENOUS at 05:50

## 2024-03-14 RX ADMIN — CEFEPIME 1000 MG: 1 INJECTION, POWDER, FOR SOLUTION INTRAMUSCULAR; INTRAVENOUS at 10:13

## 2024-03-14 RX ADMIN — SODIUM CHLORIDE, PRESERVATIVE FREE 10 ML: 5 INJECTION INTRAVENOUS at 02:24

## 2024-03-14 RX ADMIN — POTASSIUM CHLORIDE 10 MEQ: 7.46 INJECTION, SOLUTION INTRAVENOUS at 04:55

## 2024-03-14 RX ADMIN — ALBUMIN (HUMAN) 25 G: 12.5 INJECTION, SOLUTION INTRAVENOUS at 12:46

## 2024-03-14 RX ADMIN — VANCOMYCIN HYDROCHLORIDE 1500 MG: 10 INJECTION, POWDER, LYOPHILIZED, FOR SOLUTION INTRAVENOUS at 16:18

## 2024-03-14 RX ADMIN — HEPARIN SODIUM 5000 UNITS: 10000 INJECTION INTRAVENOUS; SUBCUTANEOUS at 09:57

## 2024-03-14 RX ADMIN — Medication 1 MG: at 09:56

## 2024-03-14 RX ADMIN — HYDROCORTISONE: 1 CREAM TOPICAL at 09:56

## 2024-03-14 RX ADMIN — SODIUM CHLORIDE, PRESERVATIVE FREE 10 ML: 5 INJECTION INTRAVENOUS at 21:04

## 2024-03-14 RX ADMIN — POTASSIUM CHLORIDE 10 MEQ: 7.46 INJECTION, SOLUTION INTRAVENOUS at 09:37

## 2024-03-14 RX ADMIN — MAGNESIUM SULFATE HEPTAHYDRATE 2000 MG: 40 INJECTION, SOLUTION INTRAVENOUS at 02:19

## 2024-03-14 RX ADMIN — SODIUM BICARBONATE 1300 MG: 650 TABLET ORAL at 16:04

## 2024-03-14 RX ADMIN — SODIUM CHLORIDE 25 ML/HR: 3 INJECTION, SOLUTION INTRAVENOUS at 12:26

## 2024-03-14 RX ADMIN — ALBUMIN (HUMAN) 25 G: 12.5 INJECTION, SOLUTION INTRAVENOUS at 18:51

## 2024-03-14 ASSESSMENT — PAIN SCALES - GENERAL
PAINLEVEL_OUTOF10: 0

## 2024-03-14 NOTE — PROGRESS NOTES
Consult and care deferred to Dr. Nieves, he had discussed case with ER physician on initial nephrology consult. Orders I had placed discontinued.    Discussed with Dr. Nieves.

## 2024-03-14 NOTE — PROGRESS NOTES
[facility]  Internal Medicine Department  Division of Pulmonary, Critical Care and Sleep Medicine  Daily Intensive Care Unit Progress  Note    Admit Date: 3/13/2024    MRN: [id number]        Patient:  Abbei Amaya 63 y.o. female     PCP: William Olea MD    Date of Service: 3/14/2024      Allergy: Patient has no known allergies.    Subjective   Length of stay during current admission: 0  Events of the past 24 hours are in the residents notes and we discussed at bedside briefly...       Alert   Depressed  Frail  depressed    Physical Exam:   VITALS:  /75   Pulse 81   Temp 97.4 °F (36.3 °C) (Temporal)   Resp 17   Ht 1.702 m (5' 7\")   Wt 56.3 kg (124 lb 1.9 oz)   SpO2 99%   BMI 19.44 kg/m²   8HR INTAKE OUTPUT:  In: -   Out: 500 [Urine:500]  General: Alert  HEENT: Conjunctiva/corneas clear, No icterus. No exudates.   Neck: Supple, No JVD  Chest wall: Symmetric excursion  Lung: Course to auscultation No rales or wheezing  Heart: Regular rate and rhythm, No murmur, rub or  gallop.   Abdomen: BS +, soft, no rigidity, rebound or guarding  Extremities: Trace edema. Palp pulses.   Skin: No rashes  Musculokeletal: No trauma signs   Lymph nodes: No adenopathy  Neurologic: Non focal examination    Medications   Home and Current medications were discussed & reviewed on rounds with team and pharmacy liaison.  Labs &  Imaging Studies   The data collected below information that was obtained, reviewed, analyzed and interpreted today. Imaging test are reviewed with the radiologist during rounds. Comparison to previous images are always explored.     CBC:   Lab Results   Component Value Date/Time    WBC 8.1 03/14/2024 09:44 AM    RBC 3.53 03/14/2024 09:44 AM    HGB 12.0 03/14/2024 09:44 AM    HCT 32.4 03/14/2024 09:44 AM     03/14/2024 09:44 AM    MCV 91.8 03/14/2024 09:44 AM       BMP:    Lab Results   Component Value Date/Time     03/14/2024 09:44 AM    K 3.6 03/14/2024 09:44 AM    CL 88

## 2024-03-14 NOTE — CARE COORDINATION
Care Coordination: LOS 3 day Met with pt at the bedside to discuss transition of care. Lives alone at home, one story, 2 step to enter. No hx of hhc, dme or vlad. Currently using 4 ltr nc, if home 02 needed, no preference of DME.  Uses [facility] pharmacy in [place], follows with Dr Casillas. Not , no children. Next of kin is Brother Richie and sister Vega- who live out of town.  Pt states her Neighbor,will tranport home at discharge.she has number in cell phone- at first she could not remember neighbors name, but then states his name is Jhon Duff. She declines to speak to peer recovering, they are following.    Electronically signed by Yas Lyon RN on 3/14/2024 at 2:54 PM     The Plan for Transition of Care is related to the following treatment goals: dc    The Patient  was provided with a choice of provider and agrees   with the discharge plan. [x] Yes [] No    Freedom of choice list was provided with basic dialogue that supports the patient's individualized plan of care/goals, treatment preferences and shares the quality data associated with the providers. [x] Yes [] No

## 2024-03-14 NOTE — CONSULTS
Critical Care Admit/Consult Note     Patient - Abbie Amaya   MRN -  [id number]   Acct # - [id number]   - 1960      Date of Admission -  3/13/2024  3:14 PM  Date of evaluation -  3/14/2024  11/11   Hospital Day - 0    Assessment and Plan  Ms. Abbie Amaya is a 63 y.o. female with the following medical problems:     AMS, acute metabolic encephalopathy, head CT negative  Hypotonic hyponatremia, in the setting of HCTZ use vs. Cirrhosis  JARRELL Stage III most likely volume responsive prerenal JARRELL vs. ATN in the setting of hypotension with ACE inhibition/diuretic administration  Hypokalemia in the setting of thiazide diuretic use, poor oral intake  Hypomagnesemia 2/2 #3  ETOH use disorder  Troponin leak, most likely systemic 2/2 JARRLEL vs. CHF  Heart failure? proBNP 3,068 with cardiomegaly on CXR  HAGMA 2/2 JARRELL and lactic acidosis? Starvation ketoacidosis?  HTN now with hypotension  Sepsis, procalcitonin 1.49, UA with UTI  Hx of Falls  Anxiety  GERD, small hiatal hernia  IBS  Concerns for rectal bleeding, reported to have hemorrhoids   -----------------------------------------------------------------------------------------------------  Continue to monitor neurovascular status  Seizure precautions  CIWA for ETOH withdrawal  Nephrology consulted, appreciate recommendations  Continue to monitor sodium level, goal >119 and <124, follow up with BMP Q4  Obtain cortisol, uric acid, TSH and cortisol   Strict I's and O's, insert pak   Continue to monitor renal function  Renal US, on CT ABD left renal atrophy  Replace electrolytes  Hold home antihypertensives and SSRI  Send urine studies  Follow up on troponin, patient denies any chest pain  Obtain Echo  Obtain pan cultures, lactic acid, continue empiric antibiotics  Obtain CK  Continue to monitor H&H, currently stable, 14.4>>12.6  Continue thiamine and folic acid  DVT prophylaxis: Heparin Sub Q  GI prophylaxis: PPI  Social service consult for discharge planning     Interval  MD Peter    Current Outpatient Medications:     sertraline (ZOLOFT) 25 MG tablet, Take 1 tablet by mouth daily, Disp: , Rfl:     folic acid (FOLVITE) 1 MG tablet, Take 1 tablet by mouth daily, Disp: 90 tablet, Rfl: 1    thiamine 100 MG tablet, Take 1 tablet by mouth daily, Disp: 30 tablet, Rfl: 3    lisinopril-hydroCHLOROthiazide (ZESTORETIC) 20-12.5 MG per tablet, Take 1 tablet by mouth daily, Disp: 90 tablet, Rfl: 3    metoprolol tartrate (LOPRESSOR) 25 MG tablet, Take 1 tablet by mouth 2 times daily, Disp: 60 tablet, Rfl: 5    Review of Systems: Unable to obtain due to mental status, poor historian      Physical Exam:   Vital Signs:  BP 97/62   Pulse 74   Resp 12   Ht 1.702 m (5' 7\")   Wt 77.1 kg (170 lb)   SpO2 94%   BMI 26.63 kg/m²     Input/Output:  No intake/output data recorded.    Oxygen requirements: 2-3L NC         General appearance: Well developed, not in pain or distress, in no respiratory distress    HEENT: Atraumatic/normocephalic, EOMI, VANDANA, pharynx clear, dry mucosa  Chest: Equal normal breath sounds, no wheezing, no crackles and no tenderness over ribs   Cardiovascular: Normal S1 , S2, regular rate and rhythm, no murmur, rub or gallop  Abdomen: Normal sounds present, soft, lax with no tenderness, no hepatosplenomegaly, and no masses  Extremities: No edema. Pulses are equally present.   Skin: scattered bruising on BLE/BUE, excoriation on buttocks  Neurologic: Alert and oriented x 2, moving all extremities      Investigations:  Labs, radiological imaging and cardiac work up were personally reviewed      Case and plan discussed with Dr. García      Electronically signed by Gita Riley, BELME - CNP on 3/14/2024 at 1:19 AM

## 2024-03-14 NOTE — CONSULTS
[facility]  Nephrology Consult Note    Patient's Name: Abbie Amaya    Reason for Consult: Hyponatremia    Chief Complaint: Low blood pressure, GI problem  History Obtained From:  patient, past medical records, and EMR    History of Present Illness:    Abbie Amaya is a 63 y.o. female with a past medical history of ETOH abuse, cervical dysplasia, and hypertension.  She presented to the ED on 3/13 with low blood pressure and GI problem (reportedly concern for bright red rectal bleeding).  Vital signs on 3/13 includes respiration 16, pulse 95, BP 88/54, and she was 96% SpO2.  Lab data on 3/17 includes sodium 119, potassium 2.8, BUN 46, creatinine 2.6, CO2 19, anion gap 20, magnesium 1.4, lactic acid 2.3, proBNP 3068, troponin 79, WBC 16.8 K.  She had a chest x-ray on 3/13 which showed left perihilar airspace disease, right lung is clear.  CT head 3/13 no acute intracranial abnormality.  Nephrology has been consulted to see the patient for hyponatremia.  At present, patient was seen and examined.  She is a questionable historian and is only oriented to person and place at this time.  She reports that her appetite was fair.  She denies any chest pain or shortness of breath.  She denies any nausea, vomiting, or diarrhea.  She denies any fevers or chills.    PMH:    Past Medical History:   Diagnosis Date    Anxiety     Cervical dysplasia     Depression     ETOH abuse     vodka    GERD (gastroesophageal reflux disease)     Hypertension        Past Surgical History:   Procedure Laterality Date    BREAST CYST EXCISION      COLONOSCOPY  10/17/2014    repeat 5 years [facility] - hemorrhoids sessile polyp    WRIST SURGERY         Patient Active Problem List   Diagnosis    Essential hypertension    Generalized anxiety disorder    Gastroesophageal reflux disease without esophagitis    H/O alcohol abuse    Cervical dysplasia    Alcohol withdrawal delirium (HCC)    JARRELL (acute kidney injury) (HCC)    Hyponatremia  KETUA NEGATIVE 03/14/2024 03:22 AM    UROBILINOGEN 0.2 03/14/2024 03:22 AM    BILIRUBINUR SMALL 03/14/2024 03:22 AM       Lab Results   Component Value Date/Time    RADHA 25 03/14/2024 03:22 AM       No components found for: \"URIC\"    No results found for: \"LIPIDPAN\"    Assessment and Plans:    Hyponatremia, hypotonic  patient on lisinopril-HCTZ prior to admission  History of ETOH abuse  Sodium 118 on 3/13--> 120 today  Serum osmolality 259  Urine sodium 25, urine urea 4011, urine creatinine 111.2  TSH 0.22 and cortisol 34.3  Uric acid 8.1  Strict I&O  May require short course hypertonic saline  Strict I&O  Monitor BMPs every 6 hours    2.  JARRELL stage III  Presumed likely decreased effective renal perfusion as patient was on lisinopril-HCTZ prior to admission  Baseline creatinine 0.5  Creatinine peaked at 2.6 on 3/13--> 1.8 today  FENa 0.4% and FEUrea 16.2% supports prerenal etiology  Retroperitoneal US 3/14 unremarkable US of kidneys/bladder  Strict I&O, daily weights  Avoid nephrotoxins/NSAIDs  Lisinopril-HCTZ on hold  Monitor labs    3.  Hypokalemia and hypomagnesemia  S/p potassium and magnesium supplementation  Supplement potassium and magnesium as needed  Monitor BMPs every 6 hours for now    4.  Intermittent hypotension  On Levophed  Defer to ICU    5.  High anion gap metabolic acidosis  With JARRELL  Anion gap 19 and CO2 17 on 3/14--> CO2 15  Sodium bicarbonate 1300 mg oral 3 times daily  Monitor lab    Yennifer Bragg, APRN - CNP    Patient examined , chart reviewed , Labs noted   Heavy alcohol abuser - vodka 1/2 a bottle /day   Has been in rehab , alcohol levels ok on arrival   Er eval suggested - hypotensive , hyponatremic 118   Possible rectal bleed , In JARRELL , Acidotic with Gap, lactic acidosis    Hypokalemic , hypomagnesemic , high BNP , UA - BLAND   Hypoxic , CT abdomen left renal atrophy , chest x ray Left - possible pneumonia     K+ , MAG replaced , slow hydration , NA+ unchanged since admission last PM

## 2024-03-14 NOTE — PROGRESS NOTES
Pharmacy Consultation Note  (Antibiotic Dosing and Monitoring)    Initial consult date: 3/14/24  Consulting physician/provider: Gita Riley CNP  Drug: Vancomycin  Indication: Sepsis / UTI    Age/  Gender Height Weight IBW  Allergy Information   63 y.o./female 1.702 m (5' 7\")  56.3 kg (124 lb 1.9 oz)    Ideal body weight: 61.6 kg (135 lb 12.9 oz)   Patient has no known allergies.      Renal Function:  Recent Labs     03/13/24  1530 03/13/24  1930   BUN 46* 48*   CREATININE 2.6* 2.3*     No intake or output data in the 24 hours ending 03/14/24 0348    Vancomycin Monitoring:  Trough:  No results for input(s): \"VANCOTROUGH\" in the last 72 hours.  Random:  No results for input(s): \"VANCORANDOM\" in the last 72 hours.    No results for input(s): \"BLOODCULT2\" in the last 72 hours.     Historical Cultures:  No results found for: \"ORG\"  No results for input(s): \"BC\" in the last 72 hours.    Vancomycin Administration Times:  Recent vancomycin administrations                     vancomycin (VANCOCIN) 1500 mg in sodium chloride 0.9 % 250 mL IVPB (mg) 1,500 mg New Bag 03/13/24 2313                    Assessment:  Patient is a 63 y.o. female who has been initiated on vancomycin  Estimated Creatinine Clearance: 22 mL/min (A) (based on SCr of 2.3 mg/dL (H)).  To dose vancomycin, pharmacy will be utilizing dosing based off of levels because of patient's renal impairment/insufficiency    Plan:  Received vancomycin 1500 mg IV x1 in ED  Will check vancomycin levels when appropriate - random level today  Will continue to monitor renal function   Clinical pharmacy to follow    Josh Rice PharmD, BCPS 3/14/2024 3:48 AM

## 2024-03-14 NOTE — CONSULTS
Department of Internal Medicine  Infectious Diseases   Consult Note      Reason for Consult: Leukocytosis       Requesting Physician:  Dr Copeland         HISTORY OF PRESENT ILLNESS:      This is a 63 yrs old female with hx of  HTN, GERD, alcohol abuse presented to the ER with rectal bleeding - she denied abdomen pain, nausea,vomiting, diarrhea   She denied increased shortness of breath, chest pain, or sputum expectoration   WBC was 16.8 K   Procalcitonin 1.49  Pro BNP 3008  T ivory 3.2, Alk P 143  Chest x ray - LL infiltrates  Pt was started on iv cefepime and vancomycin         Past Medical History:    Past Medical History:   Diagnosis Date    Anxiety     Cervical dysplasia     Depression     ETOH abuse     vodka    GERD (gastroesophageal reflux disease)     Hypertension          Past Surgical History:      Past Surgical History:   Procedure Laterality Date    BREAST CYST EXCISION      COLONOSCOPY  10/17/2014    repeat 5 years [place] - hemorrhoids sessile polyp    WRIST SURGERY           Current Medications:      Current Facility-Administered Medications   Medication Dose Route Frequency Provider Last Rate Last Admin    folic acid (FOLVITE) tablet 1 mg  1 mg Oral Daily Pepper Kruger APRN - CNP   1 mg at 03/14/24 0956    [Held by provider] metoprolol tartrate (LOPRESSOR) tablet 25 mg  25 mg Oral BID Pepper Kruger APRN - CNP        [Held by provider] sertraline (ZOLOFT) tablet 25 mg  25 mg Oral Daily Pepper Kruger APRN - CNP        thiamine tablet 100 mg  100 mg Oral Daily Pepper Kruger APRN - CNP   100 mg at 03/14/24 0956    sodium chloride flush 0.9 % injection 5-40 mL  5-40 mL IntraVENous 2 times per day Pepper Kruger APRN - CNP   10 mL at 03/14/24 0957    sodium chloride flush 0.9 % injection 5-40 mL  5-40 mL IntraVENous PRN Pepper Kruger APRN - CNP        0.9 % sodium chloride infusion   IntraVENous PRN Pepper Kruger APRN - CNP        ondansetron (ZOFRAN-ODT) disintegrating tablet 4 mg  4 mg Oral Q8H  (ATIVAN) tablet 1 mg  1 mg Oral Q1H PRPeter Sousa MD        Or    LORazepam (ATIVAN) injection 1 mg  1 mg IntraVENous Q1H PRN Peter Alvarado MD        Or    LORazepam (ATIVAN) tablet 2 mg  2 mg Oral Q1H PRN Peter Alvarado MD        Or    LORazepam (ATIVAN) injection 2 mg  2 mg IntraVENous Q1H PRPeter Sousa MD        Or    LORazepam (ATIVAN) tablet 3 mg  3 mg Oral Q1H PRPeter Sousa MD        Or    LORazepam (ATIVAN) injection 3 mg  3 mg IntraVENous Q1H PRPeter Sousa MD        Or    LORazepam (ATIVAN) tablet 4 mg  4 mg Oral Q1H PRPeter Sousa MD        Or    LORazepam (ATIVAN) injection 4 mg  4 mg IntraVENous Q1H PRPeter Sousa MD           Allergies:  Patient has no known allergies.    Social History:      Social History     Socioeconomic History    Marital status:      Spouse name: Not on file    Number of children: Not on file    Years of education: Not on file    Highest education level: Not on file   Occupational History    Not on file   Tobacco Use    Smoking status: Former     Current packs/day: 0.00     Average packs/day: 0.5 packs/day for 8.0 years (4.0 ttl pk-yrs)     Types: Cigarettes     Start date:      Quit date:      Years since quittin.2    Smokeless tobacco: Never    Tobacco comments:     Smoked for 8-10 years in her 20s. Smoked less than 1/2 pk a day.   Substance and Sexual Activity    Alcohol use: Not Currently     Comment: Every other day, in NH for ETOH rehab currently    Drug use: No    Sexual activity: Not on file   Other Topics Concern    Not on file   Social History Narrative    Not on file     Social Determinants of Health     Financial Resource Strain: Low Risk  (2023)    Overall Financial Resource Strain (CARDIA)     Difficulty of Paying Living Expenses: Not hard at all   Food Insecurity: Not on file (2023)   Transportation Needs: Unknown (2023)    PRAPARE - Transportation     Lack of Transportation (Medical):

## 2024-03-14 NOTE — ED NOTES
Spoke with Dr Alvarado re: social work consult, reports psych social work is not needed at this time as this is a consult for placement to a new NF - pt is expressing she does not want to remain where she is currently placed.

## 2024-03-14 NOTE — PROGRESS NOTES
Pharmacy Consultation Note  (Antibiotic Dosing and Monitoring)    Initial consult date: 3/14/24  Consulting physician/provider: Gita Riley CNP  Drug: Vancomycin  Indication: Sepsis / UTI    Age/  Gender Height Weight IBW  Allergy Information   63 y.o./female 1.702 m (5' 7\")  56.3 kg (124 lb 1.9 oz)    Ideal body weight: 61.6 kg (135 lb 12.9 oz)   Patient has no known allergies.      Renal Function:  Recent Labs     03/13/24  1530 03/13/24  1930   BUN 46* 48*   CREATININE 2.6* 2.3*     No intake or output data in the 24 hours ending 03/14/24 0348    Vancomycin Monitoring:  Trough:  No results for input(s): \"VANCOTROUGH\" in the last 72 hours.  Random:  No results for input(s): \"VANCORANDOM\" in the last 72 hours.    No results for input(s): \"BLOODCULT2\" in the last 72 hours.     Historical Cultures:  No results found for: \"ORG\"  No results for input(s): \"BC\" in the last 72 hours.    Vancomycin Administration Times:  Recent vancomycin administrations                     vancomycin (VANCOCIN) 1500 mg in sodium chloride 0.9 % 250 mL IVPB (mg) 1,500 mg New Bag 03/13/24 2316                    Assessment:  Patient is a 63 y.o. female who has been initiated on vancomycin  Estimated Creatinine Clearance: 22 mL/min (A) (based on SCr of 2.3 mg/dL (H)).  To dose vancomycin, pharmacy will be utilizing dosing based off of levels because of patient's renal impairment/insufficiency    Plan:  Vancomycin 1500 mg IV x 1 dose  Random level 3/15 AM  Will continue to monitor renal function   Clinical pharmacy to follow    Sharon Roldan PharmD Candidate 2024 3/14/2024 2:53 PM

## 2024-03-14 NOTE — PROGRESS NOTES
OCCUPATIONAL THERAPY INITIAL EVALUATION    [facility]  [address], [place]       Date:3/14/2024                                                               Patient Name: Abbie Amaya  MRN: [id number]  : 1960  Room: [address] [place], [place] [address]    Evaluating OT: Abbi Pinzon, RACQUELD,  OTR/L; [id number]    Referring Provider: Pepper Kruger APRN - CNP    Specific Provider Orders/Date: OT eval and treat (3/14/24)       Diagnosis: Alcohol withdrawal delirium (HCC) [F10.931]  Hyponatremia [E87.1]  Essential hypertension [I10]  External hemorrhoid [K64.4]  JARRELL (acute kidney injury) (HCC) [N17.9]     Reason for admission: Pt admitted with hyponatremia, concern for etoh withdrawal.    Surgery/Procedures: None this admission     Pertinent Medical History:    Past Medical History:   Diagnosis Date    Anxiety     Cervical dysplasia     Depression     ETOH abuse     vodka    GERD (gastroesophageal reflux disease)     Hypertension         *Precautions:  Fall Risk, O2, recent L 5-9 posterolateral rib fractures, cognition, alarms+, etoh, delirium+    Assessment of current deficits   [x] Functional mobility  [x]ADLs  [x] Strength               []Cognition   [x] Functional transfers   [x] IADLs         [x] Safety Awareness   [x]Endurance   [] Fine Coordination        [] ROM     [] Vision/perception   []Sensation    []Gross Motor Coordination [x] Balance   [] Delirium                  []Motor Control     [] Communication    OT PLAN OF CARE   OT POC based on physician orders, patient diagnosis and results of clinical assessment.       Frequency/Duration: 1-3 days/wk for 1-2 weeks PRN    Specific OT Treatment Interventions to include:   * Instruction/training on adapted ADL techniques and AE recommendations to increase functional independence within precautions       * Training on energy conservation strategies, correct breathing pattern and techniques to improve  Evaluation completed with PT collaboration d/t decreased functional status of patient and extensive line management. Upon arrival, patient semi supine in bed. Pt pleasant and agreeable to participate in therapy session.  Pt demo fair tolerance with fair understanding of education/techniques. Pt limited by safety and cognition during session-mildly impulsive. At end of session, patient properly positioned in chair position in bed with call light within reach, all lines and tubes intact. Pt instructed on use of call light for assistance and fall prevention. Nursing notified of patient positioning.    Patient presents with decreased ROM/strength, activity tolerance, dynamic balance, functional mobility limiting completion of ADLs and safety.  Pt can benefit from continued skilled OT services to increase safety, functional independence and quality of life.     Rehab Potential: Good for established goals    Patient / Family Goal: to return to PLOF    Patient and/or family were instructed/educated on diagnosis, prognosis/goals and plan of care. Patient demonstrated fair+ understanding.      Evaluation Complexity: Moderate     History: Expanded chart review of consults, imaging, and psychosocial history related to current functional performance.   Exam: 5+ performance deficits identified limiting functional independence and safe return home   Assistance/Modification: Min/mod assistance or modifications required to perform tasks. May have comorbidities that affect occupational performance.    [] Malnutrition indicators have been identified and nursing has been notified to ensure a dietitian consult is ordered.      Time In:1039             Time Out: 1120         Total Treatment time: 26 min   Min Units   OT Eval Low 68306     OT Eval Medium 85459 X    OT Eval High 35373     OT Re-Eval 98356     Therapeutic Ex 34064     Therapeutic Activities 39570 11 1   ADL/Self Care 06913 15 1   Orthotic Management 38597     Neuro Re-Ed

## 2024-03-14 NOTE — PROGRESS NOTES
4 Eyes Skin Assessment     NAME:  Abbie Amaya  YOB: 1960  MEDICAL RECORD NUMBER:  [id number]    The patient is being assessed for  Admission    I agree that at least one RN has performed a thorough Head to Toe Skin Assessment on the patient. ALL assessment sites listed below have been assessed.      Areas assessed by both nurses:    Head, Face, Ears, Shoulders, Back, Chest, Arms, Elbows, Hands, Sacrum. Buttock, Coccyx, Ischium, Legs. Feet and Heels, and Under Medical Devices         Does the Patient have a Wound? No noted wound(s)    Scattered bruising, scattered abrasions, redness ( blanchable) heels, elbows, buttocks, L foot, boggy heels, excoriation buttocks 5x2, 5x2         Joel Prevention initiated by RN: Yes  Wound Care Orders initiated by RN: Yes    Pressure Injury (Stage 3,4, Unstageable, DTI, NWPT, and Complex wounds) if present, place Wound referral order by RN under : No    New Ostomies, if present place, Ostomy referral order under : No     Nurse 1 eSignature: Electronically signed by Samia Garcia RN on 3/14/24 at 3:58 AM EDT    **SHARE this note so that the co-signing nurse can place an eSignature**    Nurse 2 eSignature: Electronically signed by Noy Reid RN on 3/14/24 at 7:00 AM EDT

## 2024-03-14 NOTE — CARE COORDINATION
Case Management Assessment  Initial Evaluation    Date/Time of Evaluation: 3/14/2024 2:56 PM  Assessment Completed by: Yas Lyon RN    If patient is discharged prior to next notation, then this note serves as note for discharge by case management.    Patient Name: Abbie Amaya                   YOB: 1960  Diagnosis: Alcohol withdrawal delirium (HCC) [F10.931]  Hyponatremia [E87.1]  Essential hypertension [I10]  External hemorrhoid [K64.4]  JARRELL (acute kidney injury) (HCC) [N17.9]                   Date / Time: 3/13/2024  3:14 PM    Patient Admission Status: Inpatient   Readmission Risk (Low < 19, Mod (19-27), High > 27): Readmission Risk Score: 17.8    Current PCP: William Olea MD  PCP verified by CM? Yes    Chart Reviewed: Yes      History Provided by: Patient  Patient Orientation: Alert and Oriented    Patient Cognition: Alert    Hospitalization in the last 30 days (Readmission):  Yes    If yes, Readmission Assessment in  Navigator will be completed.    Advance Directives:      Code Status: Full Code   Patient's Primary Decision Maker is: Legal Next of Kin    Primary Decision Maker: Richie Duff - Brother/Sister - [phone]    Secondary Decision Maker: Silvia Rashid - Brother/Sister - [phone]    Discharge Planning:    Patient lives with: Other (Comment) (there for rehab) Type of Home: Skilled Nursing Facility  Primary Care Giver: Self  Patient Support Systems include: Family Members   Current Financial resources:    Current community resources:    Current services prior to admission: Skilled Nursing Facility            Current DME:              Type of Home Care services:  None    ADLS  Prior functional level: Independent in ADLs/IADLs  Current functional level: Other (see comment) (in icu , will need assessed at discharge)    PT AM-PAC:   /24  OT AM-PAC: 11 /24    Family can provide assistance at DC: No  Would you like Case Management to discuss the discharge plan with any other

## 2024-03-14 NOTE — ACP (ADVANCE CARE PLANNING)
Advance Care Planning   Healthcare Decision Maker:    Primary Decision Maker: Richie Duff - Brother/Sister - [phone]    Secondary Decision Maker: Silvia Rashid - Brother/Sister - [phone]    Click here to complete Healthcare Decision Makers including selection of the Healthcare Decision Maker Relationship (ie \"Primary\").

## 2024-03-14 NOTE — PROGRESS NOTES
Chart reviewed full consultation to follow.    Briefly Mrs. Barnes is a 63-year-old  female with history of HTN, alcohol abuse, hyperlipidemia, hypomagnesemia, hypokalemia, B12 deficiency, GERD, fatty liver, generalized anxiety disorder, colonic polyp, hemorrhoids, cervical dysplasia, and recent fall complicated with multiple left rib fractures, and recent admission with alcohol intoxication and withdrawal and was discharged to [facility], and who was admitted on March 14, 2024 after she was referred from SNF due to rectal bleeding and hypotension.  In the emergency room patient was found to have a sodium level of 119 mL/L, potassium of 2.8 mEq/L, creatinine of 2.6 mg/dL, reasons for this consultation.  Of note her sodium level at discharge from her last admission was 128 mEq/L, her creatinine was 0.5 mg/dL.  Her medications prior to admission included lisinopril-HCTZ, sertraline.    Hypotonic hyponatremia, multifactorial including hemodynamically mediated due to intravascular volume depletion, decreased GFR, HCTZ, poor solute intake.  Urine specific gravity 1.020, urine sodium 25.  Although we need to prevent rapid sodium correction she is unlikely to correct rapidly in view of the coexistence of JARRELL.    JARRELL stage III, volume responsive prerenal (poor intake, HCTZ) JARRELL in the setting of ACE inhibition  Hypokalemia, with renal potassium wasting (urine K/creatinine: 28.9 mEq/g) 2/2 HCTZ and hypomagnesemia  Hypomagnesemia, multifactorial 2/2 HCTZ, alcohol abuse and poor oral intake  High anion gap metabolic acidosis, 2/2 uremia, rule out ketoacidosis (starvation +/-alcoholic ketoacidosis?)  Hypochloremia, corrected chloride for sodium of 119 on admission should be about 89; rule out coexistent metabolic alkalosis?    Plan:    Obtain ABGs  Start D5 NS + 20 KCL at 75 cc/hour  Avoid rapid correction sodium <8/24 hours  Strict I's and O's, Noble catheter placement  Call if urine volume >80

## 2024-03-14 NOTE — PROGRESS NOTES
Patient admitted to MICU with the following belongings:  Glasses, Cell Phone, Pants, Shirt, and Socks. The following belongings admitted with the patient, None, were sent home with the patient's family.

## 2024-03-15 LAB
ALBUMIN SERPL-MCNC: 4.1 G/DL (ref 3.5–5.2)
ALP SERPL-CCNC: 79 U/L (ref 35–104)
ALT SERPL-CCNC: 12 U/L (ref 0–32)
ANION GAP SERPL CALCULATED.3IONS-SCNC: 14 MMOL/L (ref 7–16)
ANION GAP SERPL CALCULATED.3IONS-SCNC: 15 MMOL/L (ref 7–16)
AST SERPL-CCNC: 19 U/L (ref 0–31)
BASOPHILS # BLD: 0 K/UL (ref 0–0.2)
BASOPHILS NFR BLD: 0 % (ref 0–2)
BILIRUB SERPL-MCNC: 2 MG/DL (ref 0–1.2)
BUN SERPL-MCNC: 31 MG/DL (ref 6–23)
BUN SERPL-MCNC: 35 MG/DL (ref 6–23)
CALCIUM SERPL-MCNC: 8.8 MG/DL (ref 8.6–10.2)
CALCIUM SERPL-MCNC: 8.9 MG/DL (ref 8.6–10.2)
CHLORIDE SERPL-SCNC: 101 MMOL/L (ref 98–107)
CHLORIDE SERPL-SCNC: 96 MMOL/L (ref 98–107)
CO2 SERPL-SCNC: 18 MMOL/L (ref 22–29)
CO2 SERPL-SCNC: 19 MMOL/L (ref 22–29)
CREAT SERPL-MCNC: 1 MG/DL (ref 0.5–1)
CREAT SERPL-MCNC: 1.2 MG/DL (ref 0.5–1)
DATE LAST DOSE: NORMAL
EOSINOPHIL # BLD: 0 K/UL (ref 0.05–0.5)
EOSINOPHILS RELATIVE PERCENT: 0 % (ref 0–6)
ERYTHROCYTE [DISTWIDTH] IN BLOOD BY AUTOMATED COUNT: 12 % (ref 11.5–15)
GFR SERPL CREATININE-BSD FRML MDRD: 51 ML/MIN/1.73M2
GFR SERPL CREATININE-BSD FRML MDRD: >60 ML/MIN/1.73M2
GLUCOSE SERPL-MCNC: 87 MG/DL (ref 74–99)
GLUCOSE SERPL-MCNC: 92 MG/DL (ref 74–99)
HCT VFR BLD AUTO: 26.3 % (ref 34–48)
HGB BLD-MCNC: 9.6 G/DL (ref 11.5–15.5)
INR PPP: 1.4
LYMPHOCYTES NFR BLD: 0.33 K/UL (ref 1.5–4)
LYMPHOCYTES RELATIVE PERCENT: 5 % (ref 20–42)
MAGNESIUM SERPL-MCNC: 1.6 MG/DL (ref 1.6–2.6)
MCH RBC QN AUTO: 34.5 PG (ref 26–35)
MCHC RBC AUTO-ENTMCNC: 36.5 G/DL (ref 32–34.5)
MCV RBC AUTO: 94.6 FL (ref 80–99.9)
MICROORGANISM SPEC CULT: NO GROWTH
MICROORGANISM SPEC CULT: NORMAL
MONOCYTES NFR BLD: 0.22 K/UL (ref 0.1–0.95)
MONOCYTES NFR BLD: 4 % (ref 2–12)
NEUTROPHILS NFR BLD: 91 % (ref 43–80)
NEUTS SEG NFR BLD: 5.75 K/UL (ref 1.8–7.3)
PHOSPHATE SERPL-MCNC: 1.3 MG/DL (ref 2.5–4.5)
PLATELET # BLD AUTO: 166 K/UL (ref 130–450)
PMV BLD AUTO: 10.5 FL (ref 7–12)
POTASSIUM SERPL-SCNC: 3.1 MMOL/L (ref 3.5–5)
POTASSIUM SERPL-SCNC: 3.8 MMOL/L (ref 3.5–5)
PROCALCITONIN SERPL-MCNC: 1.17 NG/ML (ref 0–0.08)
PROT SERPL-MCNC: 6 G/DL (ref 6.4–8.3)
PROTHROMBIN TIME: 15.2 SEC (ref 9.3–12.4)
RBC # BLD AUTO: 2.78 M/UL (ref 3.5–5.5)
RBC # BLD: NORMAL 10*6/UL
SODIUM SERPL-SCNC: 129 MMOL/L (ref 132–146)
SODIUM SERPL-SCNC: 134 MMOL/L (ref 132–146)
SPECIMEN DESCRIPTION: NORMAL
SPECIMEN DESCRIPTION: NORMAL
TME LAST DOSE: NORMAL H
VANCOMYCIN DOSE: NORMAL MG
VANCOMYCIN SERPL-MCNC: 31.7 UG/ML (ref 5–40)
WBC OTHER # BLD: 6.3 K/UL (ref 4.5–11.5)

## 2024-03-15 PROCEDURE — 2700000000 HC OXYGEN THERAPY PER DAY

## 2024-03-15 PROCEDURE — 80202 ASSAY OF VANCOMYCIN: CPT

## 2024-03-15 PROCEDURE — 2580000003 HC RX 258: Performed by: INTERNAL MEDICINE

## 2024-03-15 PROCEDURE — 6370000000 HC RX 637 (ALT 250 FOR IP)

## 2024-03-15 PROCEDURE — 80053 COMPREHEN METABOLIC PANEL: CPT

## 2024-03-15 PROCEDURE — 2580000003 HC RX 258

## 2024-03-15 PROCEDURE — 84145 PROCALCITONIN (PCT): CPT

## 2024-03-15 PROCEDURE — 6360000002 HC RX W HCPCS: Performed by: INTERNAL MEDICINE

## 2024-03-15 PROCEDURE — 97535 SELF CARE MNGMENT TRAINING: CPT

## 2024-03-15 PROCEDURE — 2000000000 HC ICU R&B

## 2024-03-15 PROCEDURE — 6370000000 HC RX 637 (ALT 250 FOR IP): Performed by: INTERNAL MEDICINE

## 2024-03-15 PROCEDURE — 6360000002 HC RX W HCPCS

## 2024-03-15 PROCEDURE — 99232 SBSQ HOSP IP/OBS MODERATE 35: CPT | Performed by: INTERNAL MEDICINE

## 2024-03-15 PROCEDURE — 84100 ASSAY OF PHOSPHORUS: CPT

## 2024-03-15 PROCEDURE — 85025 COMPLETE CBC W/AUTO DIFF WBC: CPT

## 2024-03-15 PROCEDURE — 83735 ASSAY OF MAGNESIUM: CPT

## 2024-03-15 PROCEDURE — 85610 PROTHROMBIN TIME: CPT

## 2024-03-15 PROCEDURE — 2500000003 HC RX 250 WO HCPCS

## 2024-03-15 PROCEDURE — P9045 ALBUMIN (HUMAN), 5%, 250 ML: HCPCS | Performed by: INTERNAL MEDICINE

## 2024-03-15 PROCEDURE — 97530 THERAPEUTIC ACTIVITIES: CPT

## 2024-03-15 PROCEDURE — 80048 BASIC METABOLIC PNL TOTAL CA: CPT

## 2024-03-15 RX ORDER — BENZOCAINE/MENTHOL 6 MG-10 MG
LOZENGE MUCOUS MEMBRANE 2 TIMES DAILY
Status: DISCONTINUED | OUTPATIENT
Start: 2024-03-15 | End: 2024-03-15

## 2024-03-15 RX ORDER — MULTIVITAMIN WITH IRON
1 TABLET ORAL DAILY
Status: DISCONTINUED | OUTPATIENT
Start: 2024-03-15 | End: 2024-03-20 | Stop reason: HOSPADM

## 2024-03-15 RX ORDER — MAGNESIUM SULFATE 1 G/100ML
1000 INJECTION INTRAVENOUS ONCE
Status: COMPLETED | OUTPATIENT
Start: 2024-03-15 | End: 2024-03-15

## 2024-03-15 RX ORDER — AMMONIUM LACTATE 12 G/100G
LOTION TOPICAL DAILY
Status: DISCONTINUED | OUTPATIENT
Start: 2024-03-15 | End: 2024-03-20 | Stop reason: HOSPADM

## 2024-03-15 RX ORDER — AMMONIUM LACTATE 12 G/100G
LOTION TOPICAL PRN
Status: DISCONTINUED | OUTPATIENT
Start: 2024-03-15 | End: 2024-03-15

## 2024-03-15 RX ORDER — POTASSIUM CHLORIDE 7.45 MG/ML
10 INJECTION INTRAVENOUS
Status: COMPLETED | OUTPATIENT
Start: 2024-03-15 | End: 2024-03-15

## 2024-03-15 RX ADMIN — Medication: at 13:13

## 2024-03-15 RX ADMIN — Medication 100 MG: at 07:52

## 2024-03-15 RX ADMIN — Medication 1 MG: at 07:52

## 2024-03-15 RX ADMIN — HEPARIN SODIUM 5000 UNITS: 10000 INJECTION INTRAVENOUS; SUBCUTANEOUS at 07:59

## 2024-03-15 RX ADMIN — HEPARIN SODIUM 5000 UNITS: 10000 INJECTION INTRAVENOUS; SUBCUTANEOUS at 00:07

## 2024-03-15 RX ADMIN — HEPARIN SODIUM 5000 UNITS: 10000 INJECTION INTRAVENOUS; SUBCUTANEOUS at 17:05

## 2024-03-15 RX ADMIN — POTASSIUM CHLORIDE 10 MEQ: 7.46 INJECTION, SOLUTION INTRAVENOUS at 01:27

## 2024-03-15 RX ADMIN — POTASSIUM CHLORIDE 10 MEQ: 7.46 INJECTION, SOLUTION INTRAVENOUS at 03:17

## 2024-03-15 RX ADMIN — POTASSIUM CHLORIDE 10 MEQ: 7.46 INJECTION, SOLUTION INTRAVENOUS at 02:20

## 2024-03-15 RX ADMIN — SODIUM BICARBONATE 1300 MG: 650 TABLET ORAL at 07:52

## 2024-03-15 RX ADMIN — POTASSIUM PHOSPHATE, MONOBASIC AND POTASSIUM PHOSPHATE, DIBASIC 15 MMOL: 224; 236 INJECTION, SOLUTION, CONCENTRATE INTRAVENOUS at 09:04

## 2024-03-15 RX ADMIN — ALBUMIN (HUMAN) 25 G: 12.5 INJECTION, SOLUTION INTRAVENOUS at 03:20

## 2024-03-15 RX ADMIN — CEFTRIAXONE SODIUM 1000 MG: 1 INJECTION, POWDER, FOR SOLUTION INTRAMUSCULAR; INTRAVENOUS at 11:53

## 2024-03-15 RX ADMIN — MULTIVITAMIN TABLET 1 TABLET: TABLET at 12:05

## 2024-03-15 RX ADMIN — SODIUM CHLORIDE, PRESERVATIVE FREE 10 ML: 5 INJECTION INTRAVENOUS at 21:59

## 2024-03-15 RX ADMIN — MAGNESIUM SULFATE HEPTAHYDRATE 1000 MG: 1 INJECTION, SOLUTION INTRAVENOUS at 07:53

## 2024-03-15 RX ADMIN — HYDROCORTISONE: 1 CREAM TOPICAL at 07:58

## 2024-03-15 RX ADMIN — SODIUM CHLORIDE, PRESERVATIVE FREE 10 ML: 5 INJECTION INTRAVENOUS at 07:52

## 2024-03-15 RX ADMIN — POTASSIUM BICARBONATE 40 MEQ: 782 TABLET, EFFERVESCENT ORAL at 01:28

## 2024-03-15 RX ADMIN — SODIUM BICARBONATE 1300 MG: 650 TABLET ORAL at 12:05

## 2024-03-15 RX ADMIN — SODIUM BICARBONATE 1300 MG: 650 TABLET ORAL at 21:58

## 2024-03-15 ASSESSMENT — PAIN SCALES - GENERAL
PAINLEVEL_OUTOF10: 0

## 2024-03-15 NOTE — PROGRESS NOTES
Hospitalist Progress Note      PCP: William Olea MD    Date of Admission: 3/13/2024        Hospital Course:  63 y.o. female presented with HYPERNATREMIA,  SHE IS A POOR HISTORIAN, AND DOES NOT KNOW WHY SHE IS HERE. SHE LIVES ALONE AND APPARENTLY WAS FOUND ON THE FLOOD.. ? ETOH ABUSE           Subjective:  SITTING UP TO SIDE OF BED, HAD A LARGE BM           Medications:  Reviewed    Infusion Medications    sodium chloride       Scheduled Medications    multivitamin  1 tablet Oral Daily    ammonium lactate   Topical Daily    folic acid  1 mg Oral Daily    [Held by provider] metoprolol tartrate  25 mg Oral BID    sodium chloride flush  5-40 mL IntraVENous 2 times per day    heparin (porcine)  5,000 Units SubCUTAneous Q8H    cefTRIAXone (ROCEPHIN) IV  1,000 mg IntraVENous Q24H    sodium bicarbonate  1,300 mg Oral TID     PRN Meds: sodium chloride flush, ondansetron **OR** ondansetron, acetaminophen **OR** acetaminophen, mineral oil-hydrophilic petrolatum, medicated lip balm, sodium chloride      Intake/Output Summary (Last 24 hours) at 3/15/2024 1707  Last data filed at 3/15/2024 1600  Gross per 24 hour   Intake 3085.09 ml   Output 2355 ml   Net 730.09 ml       Exam:    /86   Pulse 99   Temp 98.6 °F (37 °C)   Resp 20   Ht 1.702 m (5' 7\")   Wt 55.9 kg (123 lb 3.8 oz)   SpO2 (!) 81%   BMI 19.30 kg/m²       General appearance:  No apparent distress, appears stated age.  HEENT:  Normal cephalic,   Neck: Supple, with full range of motion. No jugular venous distention. Trachea midline.  Respiratory:  Normal respiratory effort. Clear to auscultation,   Cardiovascular:  RRR  Abdomen: Soft, non-tender, non-distended with normal bowel sounds.  Musculoskeletal:  No clubbing, cyanosis or edema bilaterally.    Skin: smooth and dry   Neurologic:   Cranial nerves: II-XII intact,   Psychiatric:  Alert and oriented x 1             Labs:   Recent Labs     03/14/24  0322 03/14/24  0944 03/15/24  0503   WBC 11.7*  8.1 6.3   HGB 12.7 12.0 9.6*   HCT 34.7 32.4* 26.3*    212 166     Recent Labs     03/14/24  0322 03/14/24  0944 03/14/24  1821 03/15/24  0002 03/15/24  0503   *   < > 122* 129* 134   K 2.6*   < > 3.1* 3.1* 3.8   CL 82*   < > 103 96* 101   CO2 17*   < > 17* 19* 18*   BUN 48*   < > 38* 35* 31*   CREATININE 2.1*   < > 1.3* 1.2* 1.0   CALCIUM 8.5*   < > 8.0* 8.9 8.8   PHOS 3.8  --   --   --  1.3*    < > = values in this interval not displayed.     Recent Labs     03/14/24  0322 03/14/24  0944 03/15/24  0503   AST 34* 30 19   ALT 17 15 12   BILITOT 2.0* 2.1* 2.0*   ALKPHOS 121* 110* 79     Recent Labs     03/13/24  1530 03/15/24  0503   INR 1.7 1.4     Recent Labs     03/14/24  0322   CKTOTAL 138     Recent Labs     03/14/24  0322 03/14/24  0944 03/15/24  0503   AST 34* 30 19   ALT 17 15 12   BILITOT 2.0* 2.1* 2.0*   ALKPHOS 121* 110* 79     Recent Labs     03/13/24  1530 03/14/24  0322   LACTA 2.3* 0.9     Lab Results   Component Value Date    URICACID 8.1 (H) 03/14/2024     Lab Results   Component Value Date    AMMONIA 15 03/14/2024       Assessment:    Active Hospital Problems    Diagnosis Date Noted    Hyponatremia [E87.1] 03/14/2024    JARRELL (acute kidney injury) (HCC) [N17.9] 03/13/2024   ETOH ABUSE  CAP        PLAN:  NEPHROLOGY   ID CONSULT  REPLACE LYTES   ROCEPHIN        DVT Prophylaxis: SCD  Diet: ADULT DIET; Regular; Low Phosphorus (Less than 1000 mg); No Fluids on Tray  Code Status: Full Code     PT/OT Eval Status:  ORDERED     Dispo -  ROXANA  Electronically signed by Nash Copeland DO on 3/15/2024 at 5:07 PM [email or web address]

## 2024-03-15 NOTE — PROGRESS NOTES
Department of Internal Medicine  Infectious Diseases  Progress  Note      C/C : Leukocytosis , Pneumonia      Pt is awake and alert  Denies fever or chills  Denies SOB, cough, chest pain   Afebrile       Current Facility-Administered Medications   Medication Dose Route Frequency Provider Last Rate Last Admin    potassium phosphate 15 mmol in sodium chloride 0.9 % 250 mL IVPB  15 mmol IntraVENous Once Gita Riley APRN - CNP 62.5 mL/hr at 03/15/24 0904 15 mmol at 03/15/24 0904    folic acid (FOLVITE) tablet 1 mg  1 mg Oral Daily Pepper Kruger APRN - CNP   1 mg at 03/15/24 0752    [Held by provider] metoprolol tartrate (LOPRESSOR) tablet 25 mg  25 mg Oral BID Pepper Kruger APRN - CNP        thiamine tablet 100 mg  100 mg Oral Daily Pepper Kruger APRN - CNP   100 mg at 03/15/24 0752    sodium chloride flush 0.9 % injection 5-40 mL  5-40 mL IntraVENous 2 times per day Pepper Kruger APRN - CNP   10 mL at 03/15/24 0752    sodium chloride flush 0.9 % injection 5-40 mL  5-40 mL IntraVENous PRN Pepper Kruger APRN - CNP        ondansetron (ZOFRAN-ODT) disintegrating tablet 4 mg  4 mg Oral Q8H PRN Pepper Kruger APRN - CNP        Or    ondansetron (ZOFRAN) injection 4 mg  4 mg IntraVENous Q6H PRN Pepper Kruger APRN - CNP        acetaminophen (TYLENOL) tablet 650 mg  650 mg Oral Q6H PRN Pepper Kruger APRN - CNP        Or    acetaminophen (TYLENOL) suppository 650 mg  650 mg Rectal Q6H PRN Pepper Kruger APRN - CNP        heparin (porcine) injection 5,000 Units  5,000 Units SubCUTAneous Q8H Gita Riley APRN - CNP   5,000 Units at 03/15/24 0759    hydrocortisone 1 % cream   Topical BID Gita Riley APRN - CNP   Given at 03/15/24 0758    mineral oil-hydrophilic petrolatum (HYDROPHOR) ointment   Topical BID PRN Gita Riley APRN - CNP        medicated lip balm (BLISTEX/CARMEX) stick   Topical PRN Gita Riley, BELEM - AUGUSTO        norepinephrine (LEVOPHED) 16 mg in sodium  03/14/2024 03:22 AM    PHUR 6.0 03/14/2024 03:22 AM    LABCAST RARE 10/16/2016 01:00 PM    WBCUA 0 TO 5 03/14/2024 03:22 AM    RBCUA 0 TO 2 03/14/2024 03:22 AM    MUCUS PRESENT 03/14/2024 03:22 AM    BACTERIA 1+ 03/14/2024 03:22 AM    CLARITYU Clear 02/07/2022 03:15 PM    SPECGRAV 1.020 03/14/2024 03:22 AM    LEUKOCYTESUR NEGATIVE 03/14/2024 03:22 AM    UROBILINOGEN 0.2 03/14/2024 03:22 AM    BILIRUBINUR SMALL 03/14/2024 03:22 AM    BLOODU Negative 02/07/2022 03:15 PM    GLUCOSEU NEGATIVE 03/14/2024 03:22 AM       ABG:  No results found for: \"XGF6SAI\", \"BEART\", \"B1RZNJTO\", \"PHART\", \"THGBART\", \"OXK8DPE\", \"PO2ART\", \"LZI3TLL\"    MICROBIOLOGY:    Blood culture - negative     Resp panel negative         Legionella Pneumophilia Ag, Urine NEGATIVE     Comment:       L. pneumophila serogroup 1 antigen not detected.  A negative result does not exclude infection with Leginella pnemophila serogroup 1 nor does  it rule out other microbial-caused respiratory infections of disease caused by other  serogroups of Legionella pneumophila.      STREP PNEUMONIAE ANTIGEN [[id number]] Collected: 03/14/24 0322   Order Status: Completed Specimen: Urine, clean catch Updated: 03/14/24 1002    Source Unknown    Strep pneumo Ag NEGATIVE    Comment:               Procalcitonin 1.17     Radiology :    Chest X ray      1. Left perihilar airspace disease  2. The right lung is clear  3. Cardiomegaly.     Echo -   Left Ventricle: Normal left ventricular systolic function with a visually estimated EF of 55 - 60%. Left ventricle size is normal. Normal wall thickness. Normal wall motion. Indeterminate diastolic function.    Right Ventricle: Right ventricle size is normal. Normal systolic function.    Tricuspid Valve: Mildly elevated RVSP, consistent with mild pulmonary hypertension. Est RA pressure is 3 mmHg. The estimated PASP is 39 mmHg.    Left Atrium: Left atrium size is normal.    Right Atrium: Right atrium size is normal.    Aorta: Normal sized

## 2024-03-15 NOTE — ACP (ADVANCE CARE PLANNING)
Advance Care Planning   Healthcare Decision Maker:    Primary Decision Maker: Richie Duff - Brother/Sister - [phone]    Secondary Decision Maker: Silvia Rashid - Brother/Sister - [phone]      Richie Duff  is Power of          Click here to complete Healthcare Decision Makers including selection of the Healthcare Decision Maker Relationship (ie \"Primary\").

## 2024-03-15 NOTE — PLAN OF CARE
Problem: Safety - Adult  Goal: Free from fall injury  Outcome: Progressing  Flowsheets (Taken 3/15/2024 0015)  Free From Fall Injury:   Instruct family/caregiver on patient safety   Based on caregiver fall risk screen, instruct family/caregiver to ask for assistance with transferring infant if caregiver noted to have fall risk factors     Problem: Pain  Goal: Verbalizes/displays adequate comfort level or baseline comfort level  Outcome: Progressing  Flowsheets (Taken 3/15/2024 0015)  Verbalizes/displays adequate comfort level or baseline comfort level:   Encourage patient to monitor pain and request assistance   Assess pain using appropriate pain scale   Implement non-pharmacological measures as appropriate and evaluate response   Administer analgesics based on type and severity of pain and evaluate response   Notify Licensed Independent Practitioner if interventions unsuccessful or patient reports new pain   Consider cultural and social influences on pain and pain management     Problem: Genitourinary - Adult  Goal: Urinary catheter remains patent  Outcome: Progressing  Flowsheets (Taken 3/15/2024 0015)  Urinary catheter remains patent: Assess patency of urinary catheter     Problem: Infection - Adult  Goal: Absence of infection at discharge  Outcome: Progressing  Flowsheets (Taken 3/15/2024 0015)  Absence of infection at discharge:   Assess and monitor for signs and symptoms of infection   Monitor lab/diagnostic results   Monitor all insertion sites i.e., indwelling lines, tubes and drains   Administer medications as ordered   Instruct and encourage patient and family to use good hand hygiene technique     Problem: Metabolic/Fluid and Electrolytes - Adult  Goal: Electrolytes maintained within normal limits  Outcome: Progressing  Flowsheets (Taken 3/15/2024 0015)  Electrolytes maintained within normal limits:   Monitor labs and assess patient for signs and symptoms of electrolyte imbalances   Administer  [FreeTextEntry1] : Pt with R ear tinnitus with benign ear exam \par - audio done: stable\par patient with tone tinnitus - discussed pathophysiology of tinnitus and usual prognosis and treatment. will try otovits and masking techniques. If persist and bothersome can try pitch therapy, or acupuncture\par Discussed with patient possible causes of asymmetry in tinnitus. Amongst the causes the most likely is a acute viral infection that decreased hearing in one ear. There is also a chance it may be caused but a lesion pushing on the nerve of hearing. This lesions is typically slow growing and can be detected with MRI another option is to monitor hearing with serial audiograms.she elected to continue to watch hearing with serial audiograms.\par has been stable since last audio \par

## 2024-03-15 NOTE — PROGRESS NOTES
Pharmacy Consultation Note  (Antibiotic Dosing and Monitoring)    Initial consult date: 3/14/24  Consulting physician/provider: Gita Riley CNP  Drug: Vancomycin  Indication: Sepsis / UTI    Age/  Gender Height Weight IBW  Allergy Information   63 y.o./female 1.702 m (5' 7\")  56.3 kg (124 lb 1.9 oz)    Ideal body weight: 61.6 kg (135 lb 12.9 oz)   Patient has no known allergies.      Renal Function:  Recent Labs     03/13/24  1530 03/13/24  1930   BUN 46* 48*   CREATININE 2.6* 2.3*     No intake or output data in the 24 hours ending 03/14/24 0348    Vancomycin Monitoring:  Trough:  No results for input(s): \"VANCOTROUGH\" in the last 72 hours.  Random:  No results for input(s): \"VANCORANDOM\" in the last 72 hours.    No results for input(s): \"BLOODCULT2\" in the last 72 hours.     Historical Cultures:  No results found for: \"ORG\"  No results for input(s): \"BC\" in the last 72 hours.    Vancomycin Administration Times:  Recent vancomycin administrations                     vancomycin (VANCOCIN) 1500 mg in sodium chloride 0.9 % 250 mL IVPB (mg) 1,500 mg New Bag 03/13/24 2316                    Assessment:  Patient is a 63 y.o. female who has been initiated on vancomycin  Estimated Creatinine Clearance: 22 mL/min (A) (based on SCr of 2.3 mg/dL (H)).  To dose vancomycin, pharmacy will be utilizing dosing based off of levels because of patient's renal impairment/insufficiency    Plan:  Vancomycin has been discontinued. Clinical pharmacy will sign off, please reconsult if further assistance is needed.    Sharon Roldan, PharmD Candidate 2024 3/14/2024 2:53 PM

## 2024-03-15 NOTE — CARE COORDINATION
Care Coordination: LOS 1 day. Upon chart review, pt is readmission. Pt was discharged to [facility] last admission.  Call to Meg from [facility] to confirm pt was not discharged home prior to admission. She will let me know. ADDENDUM:Call from Brother Richie Duff, who states he  is POA and lives in [place]([phone]. Pt is long term at [facility], where she has been the last month and pt will be returning to. Brother was not notified by  SNF facility that pt was admitted- but did receive a call from registration,which alerted him to admission. He asked he be contacted for all decisions and permits needed as she is not completely oriented at times. I have provided him with unit number.  Spoke to Meg at [facility]- she has forwarded the above information to social work. Plan will be to return to [facility] long term. Transport envelope, rhina completed.    Electronically signed by Yas Lyon RN on 3/15/2024 at 12:31 PM

## 2024-03-15 NOTE — DISCHARGE INSTR - COC
Continuity of Care Form    Patient Name: Abbie Amaya   :  1960  MRN:  [id number]    Admit date:  3/13/2024  Discharge date:  2024    Code Status Order: Full Code   Advance Directives:     Admitting Physician:  Nash Copeland DO  PCP: William Olea MD    Discharging Nurse: Zakia Holt RN  Discharging Hospital Unit/Room#: 4406/4406-A  Discharging Unit Phone Number: [phone]    Emergency Contact:   Extended Emergency Contact Information  Primary Emergency Contact: Richie Duff  Mobile Phone: [phone]  Relation: Brother/Sister  Secondary Emergency Contact: Silvia Rashid  Home Phone: [phone]  Mobile Phone: [phone]  Relation: Brother/Sister    Past Surgical History:  Past Surgical History:   Procedure Laterality Date    BREAST CYST EXCISION      COLONOSCOPY  10/17/2014    repeat 5 years [place] - hemorrhoids sessile polyp    WRIST SURGERY         Immunization History:   Immunization History   Administered Date(s) Administered    COVID-19, MODERNA BLUE border, Primary or Immunocompromised, (age 12y+), IM, 100 mcg/0.5mL 2021, 2021    Influenza Vaccine, unspecified formulation 2015    Influenza Virus Vaccine 2015    Influenza, FLUARIX, FLULAVAL, FLUZONE (age 6 mo+) AND AFLURIA, (age 3 y+), PF, 0.5mL 2022    Influenza, FLUBLOK, (age 18 y+), PF, 0.5mL 2020    Influenza, FLUCELVAX, (age 6 mo+), MDCK, PF, 0.5mL 2023       Active Problems:  Patient Active Problem List   Diagnosis Code    Essential hypertension I10    Generalized anxiety disorder F41.1    Gastroesophageal reflux disease without esophagitis K21.9    H/O alcohol abuse F10.11    Cervical dysplasia N87.9    Alcohol withdrawal delirium (HCC) F10.931    JARRELL (acute kidney injury) (HCC) N17.9    Hyponatremia E87.1       Isolation/Infection:   Isolation            No Isolation          Patient Infection Status       None to display                     Nurse Assessment:  Last Vital Signs: BP

## 2024-03-15 NOTE — PROGRESS NOTES
Progress Note  3/15/2024 9:47 AM  Subjective:   Admit Date: 3/13/2024  PCP: William Olea MD  Interval History: Patient examined doing well feels ok , more alert today     Diet: ADULT DIET; Regular; Low Phosphorus (Less than 1000 mg); No Fluids on Tray    Data:   Scheduled Meds:   potassium phosphate IVPB (PERIPHERAL LINE)  15 mmol IntraVENous Once    folic acid  1 mg Oral Daily    [Held by provider] metoprolol tartrate  25 mg Oral BID    thiamine  100 mg Oral Daily    sodium chloride flush  5-40 mL IntraVENous 2 times per day    heparin (porcine)  5,000 Units SubCUTAneous Q8H    hydrocortisone   Topical BID    cefTRIAXone (ROCEPHIN) IV  1,000 mg IntraVENous Q24H    sodium bicarbonate  1,300 mg Oral TID     Continuous Infusions:   norepinephrine Stopped (03/14/24 1406)    sodium chloride       PRN Meds:sodium chloride flush, ondansetron **OR** ondansetron, acetaminophen **OR** acetaminophen, mineral oil-hydrophilic petrolatum, medicated lip balm, sodium chloride  I/O last 3 completed shifts:  In: 3901.5 [P.O.:150; I.V.:20.8; IV Piggyback:3730.7]  Out: 2445 [Urine:2445]  I/O this shift:  In: -   Out: 430 [Urine:430]    Intake/Output Summary (Last 24 hours) at 3/15/2024 0947  Last data filed at 3/15/2024 0900  Gross per 24 hour   Intake 2745.09 ml   Output 2405 ml   Net 340.09 ml     CBC:   Recent Labs     03/14/24  0322 03/14/24  0944 03/15/24  0503   WBC 11.7* 8.1 6.3   HGB 12.7 12.0 9.6*    212 166     BMP:    Recent Labs     03/14/24  1821 03/15/24  0002 03/15/24  0503   * 129* 134   K 3.1* 3.1* 3.8    96* 101   CO2 17* 19* 18*   BUN 38* 35* 31*   CREATININE 1.3* 1.2* 1.0   GLUCOSE 115* 92 87     Hepatic:   Recent Labs     03/14/24  0322 03/14/24  0944 03/15/24  0503   AST 34* 30 19   ALT 17 15 12   BILITOT 2.0* 2.1* 2.0*   ALKPHOS 121* 110* 79     Troponin: No results for input(s): \"TROPONINI\" in the last 72 hours.  BNP: No results for input(s): \"BNP\" in the last 72 hours.  Lipids: No   Temp 98.6 °F (37 °C)   Resp 16   Ht 1.702 m (5' 7\")   Wt 55.9 kg (123 lb 3.8 oz)   SpO2 96%   BMI 19.30 kg/m²   General appearance: appears stated age   Skin:  No rashes or lesions  HEENT: Head: Normocephalic, no lesions, without obvious abnormality.  Neck: no adenopathy, no carotid bruit, no JVD, supple, symmetrical, trachea midline, and thyroid not enlarged, symmetric, no tenderness/mass/nodules  Lungs: diminished breath sounds bibasilar  Heart: regular rate and rhythm, S1, S2 normal, no murmur, click, rub or gallop  Abdomen: soft, non-tender; bowel sounds normal; no masses,  no organomegaly  Extremities: extremities normal, atraumatic, no cyanosis or edema  Neurologic: Mental status: Alert, oriented, thought content appropriate    Assessment:   Patient Active Problem List:     Essential hypertension     Generalized anxiety disorder     Gastroesophageal reflux disease without esophagitis     H/O alcohol abuse     Cervical dysplasia     Alcohol withdrawal delirium (HCC)     JARRELL (acute kidney injury) (HCC)     Hyponatremia    Plan:       Assessment and Plans:     Hyponatremia, hypotonic  patient on lisinopril-HCTZ prior to admission  History of ETOH abuse  Sodium 118 > 120 > 122 --->129--------> this am 134  Serum osmolality 259 on arrival   Urine sodium 25, urine urea 4011, urine creatinine 111.2  TSH 0.22 and cortisol 34.3  Uric acid 8.1  Strict I&O       2.  JARRELL stage III resolving   Presumed likely decreased effective renal perfusion as patient was on lisinopril-HCTZ prior to admission  Baseline creatinine 0.5  Creatinine peaked at 2.6 on 3/13--> 1.8 --> 1.0 today  FENa 0.4% and FEUrea 16.2% supports prerenal etiology  Retroperitoneal US 3/14 unremarkable US of kidneys/bladder  Strict I&O, daily weights  Avoid nephrotoxins/NSAIDs  Lisinopril-HCTZ on hold  Monitor labs     3.  Hypokalemia and hypermagnesemia  S/p potassium   Both MAG and K+ better        4.  Intermittent hypotension  On Levophed     5.

## 2024-03-15 NOTE — PROGRESS NOTES
OCCUPATIONAL THERAPY TREATMENT SESSION  [facility]  [address], [place]       Date:3/15/2024                                                               Patient Name: Abbie Amaya  MRN: [id number]  : 1960  Room: [address] [place] [address]    Evaluating OT: Abbi Pinzon, RACQUELD,  OTR/L; [id number]    Referring Provider: Pepper Kruger APRN - CNP    Specific Provider Orders/Date: OT eval and treat (3/14/24)       Diagnosis: Alcohol withdrawal delirium (HCC) [F10.931]  Hyponatremia [E87.1]  Essential hypertension [I10]  External hemorrhoid [K64.4]  JARRELL (acute kidney injury) (HCC) [N17.9]     Reason for admission: Pt admitted with hyponatremia, concern for etoh withdrawal.    Surgery/Procedures: None this admission     Pertinent Medical History:    Past Medical History:   Diagnosis Date    Anxiety     Cervical dysplasia     Depression     ETOH abuse     vodka    GERD (gastroesophageal reflux disease)     Hypertension         *Precautions:  Fall Risk, O2, recent L 5-9 posterolateral rib fractures, cognition, alarms+, etoh, delirium+, incontinent+    Assessment of current deficits   [x] Functional mobility  [x]ADLs  [x] Strength               []Cognition   [x] Functional transfers   [x] IADLs         [x] Safety Awareness   [x]Endurance   [] Fine Coordination        [] ROM     [] Vision/perception   []Sensation    []Gross Motor Coordination [x] Balance   [] Delirium                  []Motor Control     [] Communication    OT PLAN OF CARE   OT POC based on physician orders, patient diagnosis and results of clinical assessment.       Frequency/Duration: 1-3 days/wk for 1-2 weeks PRN    Specific OT Treatment Interventions to include:   * Instruction/training on adapted ADL techniques and AE recommendations to increase functional independence within precautions       * Training on energy conservation strategies, correct breathing pattern and techniques to improve

## 2024-03-15 NOTE — PROGRESS NOTES
[facility]  Internal Medicine Department  Division of Pulmonary, Critical Care and Sleep Medicine  Daily Intensive Care Unit Progress  Note    Admit Date: 3/13/2024    MRN: [id number]        Patient:  Abbie BUCKLEY Niece 63 y.o. female     PCP: William Olea MD    Date of Service: 3/15/2024      Allergy: Patient has no known allergies.    Subjective   Length of stay during current admission: 1  Events of the past 24 hours are in the residents notes and we discussed at bedside briefly...       Alert   Spoke with nephrology  Change to MVI  Advance diet  Skin care    Physical Exam:   VITALS:  /83   Pulse 91   Temp 98.6 °F (37 °C)   Resp 18   Ht 1.702 m (5' 7\")   Wt 55.9 kg (123 lb 3.8 oz)   SpO2 96%   BMI 19.30 kg/m²   8HR INTAKE OUTPUT:  In: -   Out: 430 [Urine:430]  General: Alert  HEENT: Conjunctiva/corneas clear, No icterus. No exudates.   Neck: Supple, No JVD  Chest wall: Symmetric excursion  Lung: Course to auscultation No rales or wheezing  Heart: Regular rate and rhythm, No murmur, rub or  gallop.   Abdomen: BS +, soft, no rigidity, rebound or guarding  Extremities: Trace edema. Palp pulses.   Skin: No rashes, dry skin   Musculokeletal: No trauma signs   Lymph nodes: No adenopathy  Neurologic: Non focal examination    Medications   Home and Current medications were discussed & reviewed on rounds with team and pharmacy liaison.  Labs &  Imaging Studies   The data collected below information that was obtained, reviewed, analyzed and interpreted today. Imaging test are reviewed with the radiologist during rounds. Comparison to previous images are always explored.     CBC:   Lab Results   Component Value Date/Time    WBC 6.3 03/15/2024 05:03 AM    RBC 2.78 03/15/2024 05:03 AM    HGB 9.6 03/15/2024 05:03 AM    HCT 26.3 03/15/2024 05:03 AM     03/15/2024 05:03 AM    MCV 94.6 03/15/2024 05:03 AM       BMP:    Lab Results   Component Value Date/Time     03/15/2024 05:03 AM    K 3.8  Cirrhosis  Elevated Procal with HCAP ( from facility)   Depression - help SSRI as it can cause low Na  JARRELL Stage III most likely volume responsive prerenal JARRELL vs. ATN in the setting of hypotension with ACE inhibition/diuretic administration  Hypokalemia in the setting of thiazide diuretic use, poor oral intake  Hypomagnesemia 2/2 #3  ETOH use disorder  Troponin leak, most likely systemic 2/2 JARRELL vs. CHF  HAGMA 2/2 JARRELL and lactic acidosis? Starvation ketoacidosis?  HTN now with hypotension  Sepsis, procalcitonin 1.49, UA with UTI results pending   Hx of Falls  Anxiety  8cm Kidney  The right kidney measures 10.5 cm in length and the left kidney measures 8.8 cm in length.   GERD, small hiatal hernia  IBS- by history  Tremors chronic   Concerns for rectal bleeding, reported to have hemorrhoids       Plan   Family was updated at the bedside if present and available. Key issues of the case were discussed among consultants.  Critical Care time is documented if appropriate.    Nephrology for Na assessment -following and discussed  Renal Ultrasound show one smaller kidney   Continue to monitor neurovascular status  Seizure precautions  MRSA nares = negative  She has been in the NH for months show unlikely but not impossible that she got Etoh so will stop ativan and CIWA for ETOH withdrawal - change to MVI  Continue to monitor sodium level, check reduced to daily   Obtain cortisol, = uric acid, TSH  Strict I's and O's, insert pak   Continue to monitor renal function  Replace electrolytes  Hold home antihypertensives and SSRI as HCTZ and all SSRI cause low Na  ID change abx for Rocephin   F/U on  Echo = normal   Obtain pan cultures, lactic acid, continue empiric antibiotics  Low TSH will get T4, free T4 was high - will ask endocrine to comment given her status, clinically does apper to be   Continue to monitor H&H, currently stable, 14.4>>12.6  Continue thiamine and folic acid  DVT prophylaxis: Heparin Sub Q  GI

## 2024-03-16 LAB
ALBUMIN SERPL-MCNC: 4.3 G/DL (ref 3.5–5.2)
ALP SERPL-CCNC: 105 U/L (ref 35–104)
ALT SERPL-CCNC: 13 U/L (ref 0–32)
ANION GAP SERPL CALCULATED.3IONS-SCNC: 11 MMOL/L (ref 7–16)
AST SERPL-CCNC: 22 U/L (ref 0–31)
BASOPHILS # BLD: 0.02 K/UL (ref 0–0.2)
BASOPHILS NFR BLD: 0 % (ref 0–2)
BILIRUB SERPL-MCNC: 2 MG/DL (ref 0–1.2)
BUN SERPL-MCNC: 19 MG/DL (ref 6–23)
CALCIUM SERPL-MCNC: 9.5 MG/DL (ref 8.6–10.2)
CHLORIDE SERPL-SCNC: 95 MMOL/L (ref 98–107)
CO2 SERPL-SCNC: 26 MMOL/L (ref 22–29)
CREAT SERPL-MCNC: 0.7 MG/DL (ref 0.5–1)
EOSINOPHIL # BLD: 0.04 K/UL (ref 0.05–0.5)
EOSINOPHILS RELATIVE PERCENT: 1 % (ref 0–6)
ERYTHROCYTE [DISTWIDTH] IN BLOOD BY AUTOMATED COUNT: 12 % (ref 11.5–15)
GFR SERPL CREATININE-BSD FRML MDRD: >60 ML/MIN/1.73M2
GLUCOSE SERPL-MCNC: 89 MG/DL (ref 74–99)
HCT VFR BLD AUTO: 32.6 % (ref 34–48)
HGB BLD-MCNC: 11.9 G/DL (ref 11.5–15.5)
IMM GRANULOCYTES # BLD AUTO: 0.05 K/UL (ref 0–0.58)
IMM GRANULOCYTES NFR BLD: 1 % (ref 0–5)
LYMPHOCYTES NFR BLD: 0.6 K/UL (ref 1.5–4)
LYMPHOCYTES RELATIVE PERCENT: 10 % (ref 20–42)
MAGNESIUM SERPL-MCNC: 1.4 MG/DL (ref 1.6–2.6)
MCH RBC QN AUTO: 34.7 PG (ref 26–35)
MCHC RBC AUTO-ENTMCNC: 36.5 G/DL (ref 32–34.5)
MCV RBC AUTO: 95 FL (ref 80–99.9)
MONOCYTES NFR BLD: 0.51 K/UL (ref 0.1–0.95)
MONOCYTES NFR BLD: 9 % (ref 2–12)
NEUTROPHILS NFR BLD: 79 % (ref 43–80)
NEUTS SEG NFR BLD: 4.59 K/UL (ref 1.8–7.3)
PHOSPHATE SERPL-MCNC: 2.4 MG/DL (ref 2.5–4.5)
PLATELET # BLD AUTO: 201 K/UL (ref 130–450)
PMV BLD AUTO: 9.6 FL (ref 7–12)
POTASSIUM SERPL-SCNC: 3.6 MMOL/L (ref 3.5–5)
PROCALCITONIN SERPL-MCNC: 0.45 NG/ML (ref 0–0.08)
PROT SERPL-MCNC: 6.8 G/DL (ref 6.4–8.3)
RBC # BLD AUTO: 3.43 M/UL (ref 3.5–5.5)
SODIUM SERPL-SCNC: 132 MMOL/L (ref 132–146)
T3FREE SERPL-MCNC: 2.19 PG/ML (ref 2–4.4)
T4 FREE SERPL-MCNC: 1.8 NG/DL (ref 0.9–1.7)
TSH SERPL DL<=0.05 MIU/L-ACNC: 0.06 UIU/ML (ref 0.27–4.2)
WBC OTHER # BLD: 5.8 K/UL (ref 4.5–11.5)

## 2024-03-16 PROCEDURE — 2700000000 HC OXYGEN THERAPY PER DAY

## 2024-03-16 PROCEDURE — 6370000000 HC RX 637 (ALT 250 FOR IP)

## 2024-03-16 PROCEDURE — 85025 COMPLETE CBC W/AUTO DIFF WBC: CPT

## 2024-03-16 PROCEDURE — 84481 FREE ASSAY (FT-3): CPT

## 2024-03-16 PROCEDURE — 84445 ASSAY OF TSI GLOBULIN: CPT

## 2024-03-16 PROCEDURE — 2580000003 HC RX 258: Performed by: INTERNAL MEDICINE

## 2024-03-16 PROCEDURE — 2580000003 HC RX 258

## 2024-03-16 PROCEDURE — 84439 ASSAY OF FREE THYROXINE: CPT

## 2024-03-16 PROCEDURE — 6370000000 HC RX 637 (ALT 250 FOR IP): Performed by: INTERNAL MEDICINE

## 2024-03-16 PROCEDURE — 84443 ASSAY THYROID STIM HORMONE: CPT

## 2024-03-16 PROCEDURE — 86376 MICROSOMAL ANTIBODY EACH: CPT

## 2024-03-16 PROCEDURE — 80053 COMPREHEN METABOLIC PANEL: CPT

## 2024-03-16 PROCEDURE — 84100 ASSAY OF PHOSPHORUS: CPT

## 2024-03-16 PROCEDURE — 99222 1ST HOSP IP/OBS MODERATE 55: CPT | Performed by: INTERNAL MEDICINE

## 2024-03-16 PROCEDURE — 6360000002 HC RX W HCPCS: Performed by: INTERNAL MEDICINE

## 2024-03-16 PROCEDURE — 83735 ASSAY OF MAGNESIUM: CPT

## 2024-03-16 PROCEDURE — 6360000002 HC RX W HCPCS

## 2024-03-16 PROCEDURE — 86800 THYROGLOBULIN ANTIBODY: CPT

## 2024-03-16 PROCEDURE — 2500000003 HC RX 250 WO HCPCS

## 2024-03-16 PROCEDURE — 99291 CRITICAL CARE FIRST HOUR: CPT | Performed by: INTERNAL MEDICINE

## 2024-03-16 PROCEDURE — 84145 PROCALCITONIN (PCT): CPT

## 2024-03-16 PROCEDURE — 2000000000 HC ICU R&B

## 2024-03-16 RX ORDER — MAGNESIUM SULFATE IN WATER 40 MG/ML
2000 INJECTION, SOLUTION INTRAVENOUS ONCE
Status: COMPLETED | OUTPATIENT
Start: 2024-03-16 | End: 2024-03-16

## 2024-03-16 RX ORDER — SODIUM BICARBONATE 650 MG/1
650 TABLET ORAL 3 TIMES DAILY
Status: DISCONTINUED | OUTPATIENT
Start: 2024-03-16 | End: 2024-03-20 | Stop reason: HOSPADM

## 2024-03-16 RX ORDER — POTASSIUM CHLORIDE 20 MEQ/1
20 TABLET, EXTENDED RELEASE ORAL ONCE
Status: COMPLETED | OUTPATIENT
Start: 2024-03-16 | End: 2024-03-16

## 2024-03-16 RX ORDER — MECOBALAMIN 5000 MCG
5 TABLET,DISINTEGRATING ORAL NIGHTLY PRN
Status: DISCONTINUED | OUTPATIENT
Start: 2024-03-16 | End: 2024-03-20 | Stop reason: HOSPADM

## 2024-03-16 RX ADMIN — HEPARIN SODIUM 5000 UNITS: 10000 INJECTION INTRAVENOUS; SUBCUTANEOUS at 15:26

## 2024-03-16 RX ADMIN — POTASSIUM PHOSPHATE, MONOBASIC AND POTASSIUM PHOSPHATE, DIBASIC 10 MMOL: 224; 236 INJECTION, SOLUTION, CONCENTRATE INTRAVENOUS at 09:18

## 2024-03-16 RX ADMIN — SODIUM BICARBONATE 1300 MG: 650 TABLET ORAL at 15:24

## 2024-03-16 RX ADMIN — SODIUM CHLORIDE, PRESERVATIVE FREE 10 ML: 5 INJECTION INTRAVENOUS at 07:42

## 2024-03-16 RX ADMIN — CEFTRIAXONE SODIUM 1000 MG: 1 INJECTION, POWDER, FOR SOLUTION INTRAMUSCULAR; INTRAVENOUS at 09:21

## 2024-03-16 RX ADMIN — Medication: at 07:42

## 2024-03-16 RX ADMIN — Medication 1 MG: at 07:42

## 2024-03-16 RX ADMIN — HEPARIN SODIUM 5000 UNITS: 10000 INJECTION INTRAVENOUS; SUBCUTANEOUS at 01:04

## 2024-03-16 RX ADMIN — SODIUM BICARBONATE 650 MG: 650 TABLET ORAL at 21:17

## 2024-03-16 RX ADMIN — HEPARIN SODIUM 5000 UNITS: 10000 INJECTION INTRAVENOUS; SUBCUTANEOUS at 07:43

## 2024-03-16 RX ADMIN — MULTIVITAMIN TABLET 1 TABLET: TABLET at 07:42

## 2024-03-16 RX ADMIN — Medication 5 MG: at 21:56

## 2024-03-16 RX ADMIN — MAGNESIUM SULFATE HEPTAHYDRATE 2000 MG: 40 INJECTION, SOLUTION INTRAVENOUS at 06:46

## 2024-03-16 RX ADMIN — POTASSIUM CHLORIDE 20 MEQ: 1500 TABLET, EXTENDED RELEASE ORAL at 06:46

## 2024-03-16 RX ADMIN — SODIUM CHLORIDE, PRESERVATIVE FREE 10 ML: 5 INJECTION INTRAVENOUS at 21:17

## 2024-03-16 RX ADMIN — SODIUM BICARBONATE 1300 MG: 650 TABLET ORAL at 07:41

## 2024-03-16 ASSESSMENT — PAIN SCALES - GENERAL: PAINLEVEL_OUTOF10: 0

## 2024-03-16 NOTE — PROGRESS NOTES
Hospitalist Progress Note      PCP: William Olea MD    Date of Admission: 3/13/2024        Hospital Course:  63 y.o. female presented with HYPERNATREMIA,  SHE IS A POOR HISTORIAN, AND DOES NOT KNOW WHY SHE IS HERE. SHE LIVES ALONE AND APPARENTLY WAS FOUND ON THE FLOOD.. ? ETOH ABUSE           Subjective:  NO COMPLAINTS           Medications:  Reviewed    Infusion Medications    sodium chloride       Scheduled Medications    sodium bicarbonate  650 mg Oral TID    multivitamin  1 tablet Oral Daily    ammonium lactate   Topical Daily    folic acid  1 mg Oral Daily    [Held by provider] metoprolol tartrate  25 mg Oral BID    sodium chloride flush  5-40 mL IntraVENous 2 times per day    heparin (porcine)  5,000 Units SubCUTAneous Q8H    cefTRIAXone (ROCEPHIN) IV  1,000 mg IntraVENous Q24H     PRN Meds: sodium chloride flush, ondansetron **OR** ondansetron, acetaminophen **OR** acetaminophen, mineral oil-hydrophilic petrolatum, medicated lip balm, sodium chloride      Intake/Output Summary (Last 24 hours) at 3/16/2024 1631  Last data filed at 3/16/2024 1454  Gross per 24 hour   Intake 1095.12 ml   Output 955 ml   Net 140.12 ml       Exam:    BP (!) 145/102   Pulse 90   Temp 98.4 °F (36.9 °C)   Resp 19   Ht 1.702 m (5' 7\")   Wt 56.2 kg (123 lb 14.4 oz)   SpO2 96%   BMI 19.41 kg/m²       General appearance:  No apparent distress, appears stated age.  HEENT:  Normal cephalic,   Neck: Supple, with full range of motion.  Trachea midline.  Respiratory:  Normal respiratory effort. Clear to auscultation,   Cardiovascular:  RRR  Abdomen: Soft, non-tender, non-distended with normal bowel sounds.  Musculoskeletal:  No clubbing, cyanosis or edema bilaterally.    Skin: smooth and dry   Neurologic:   Cranial nerves: II-XII intact,   Psychiatric:  Alert and oriented x 1                 Labs:   Recent Labs     03/14/24  0944 03/15/24  0503 03/16/24  0405   WBC 8.1 6.3 5.8   HGB 12.0 9.6* 11.9   HCT 32.4* 26.3* 32.6*

## 2024-03-16 NOTE — PLAN OF CARE
Problem: Safety - Adult  Goal: Free from fall injury  Outcome: Progressing     Problem: Discharge Planning  Goal: Discharge to home or other facility with appropriate resources  Outcome: Progressing     Problem: Pain  Goal: Verbalizes/displays adequate comfort level or baseline comfort level  Outcome: Progressing     Problem: Skin/Tissue Integrity  Goal: Absence of new skin breakdown  Description: 1.  Monitor for areas of redness and/or skin breakdown  2.  Assess vascular access sites hourly  3.  Every 4-6 hours minimum:  Change oxygen saturation probe site  4.  Every 4-6 hours:  If on nasal continuous positive airway pressure, respiratory therapy assess nares and determine need for appliance change or resting period.  Outcome: Progressing     Problem: Infection - Adult  Goal: Absence of infection at discharge  Outcome: Progressing  Goal: Absence of infection during hospitalization  Outcome: Progressing     Problem: Metabolic/Fluid and Electrolytes - Adult  Goal: Electrolytes maintained within normal limits  Outcome: Progressing     Problem: Hematologic - Adult  Goal: Maintains hematologic stability  Outcome: Progressing

## 2024-03-16 NOTE — CONSULTS
ENDOCRINOLOGY INITIAL CONSULTATION NOTE    Date of Admission: 3/13/2024  Date of Service: 3/16/2024  Admitting Physician: Nash Copeland DO   Primary Care Physician: William Olea MD  Consultant physician: Sandeep Hughes MD     Reason for the consultation:  Hyperthyroidism    History of Present Illness:  The history is provided by the patient. Accuracy of the patient data is excellent.    Abbie Amaya is a very pleasant 63 y.o. old female with PMH of ETOH abuse, cervical dysplasia, and hypertension and other listed below admitted to [facility] on [facility]/13/2024 because of a GI bleed and low BP, endocrine service was consulted for evaluation for abnormal TFT   Labs on admission Na 119, , AG 20, Bicarb 19, Cr 2.6, K 2.8     Lab Results   Component Value Date/Time    TSH 0.22 (L) 03/14/2024 03:22 AM    T4FREE 1.8 (H) 03/14/2024 06:21 PM     The patient denies any previous history of thyroid disease    Past Medical History   Past Medical History:   Diagnosis Date    Anxiety     Cervical dysplasia     Depression     ETOH abuse     vodka    GERD (gastroesophageal reflux disease)     Hypertension        Past Surgical History   Past Surgical History:   Procedure Laterality Date    BREAST CYST EXCISION      COLONOSCOPY  10/17/2014    repeat 5 years [place] - hemorrhoids sessile polyp    WRIST SURGERY         Social history   Tobacco:   reports that she quit smoking about 40 years ago. Her smoking use included cigarettes. She started smoking about 48 years ago. She has a 4.0 pack-year smoking history. She has never used smokeless tobacco.  Alcohol:   reports that she does not currently use alcohol.  Drugs:   reports no history of drug use.  Family history   Family History   Problem Relation Age of Onset    Arthritis Mother     Cancer Father         colon?    High Blood Pressure Father     Hypertension Brother        Allergies and Drug reactions  No Known Allergies    Scheduled Meds:   potassium phosphate IVPB (PERIPHERAL

## 2024-03-16 NOTE — PROGRESS NOTES
AND PELVIS WITHOUT CONTRAST 3/13/2024 9:11 pm TECHNIQUE: CT of the abdomen and pelvis was performed without the administration of intravenous contrast. Multiplanar reformatted images are provided for review. Automated exposure control, iterative reconstruction, and/or weight based adjustment of the mA/kV was utilized to reduce the radiation dose to as low as reasonably achievable. COMPARISON: None. HISTORY: ORDERING SYSTEM PROVIDED HISTORY: abdominal discomfort, worsening renal TECHNOLOGIST PROVIDED HISTORY: Reason for exam:->abdominal discomfort, worsening renal Additional Contrast?->None Decision Support Exception - unselect if not a suspected or confirmed emergency medical condition->Emergency Medical Condition (MA) What reading provider will be dictating this exam?->CRC FINDINGS: Lower Chest: Visualized lungs are normal. Organs:   The liver, spleen, adrenal glands, right kidney, pancreas and gallbladder are normal.  Left renal atrophy. GI/Bowel:   Normal large and small bowel.  The appendix is not visualized. Pelvis: Calcified uterine fibroids.  Normal urinary bladder. Peritoneum/Retroperitoneum: No free fluid or free air. Bones/Soft Tissues: Degenerative changes involving the hip joints and lumbar spine.     No definitive findings to explain the patient's abdominal pain. Left renal atrophy.       XR CHEST PORTABLE    Result Date: 3/13/2024  EXAMINATION: ONE XRAY VIEW OF THE CHEST 3/13/2024 4:05 pm COMPARISON: 02/15/2024 HISTORY: ORDERING SYSTEM PROVIDED HISTORY: shortness of breath TECHNOLOGIST PROVIDED HISTORY: Reason for exam:->shortness of breath What reading provider will be dictating this exam?->CRC FINDINGS: The heart is enlarged.  No mediastinal widening is noted The right lung is clear Left perihilar airspace disease is noted. There is no pneumothorax.     1. Left perihilar airspace disease 2. The right lung is clear 3. Cardiomegaly.       Objective:   Vitals: BP (!) 156/68   Pulse 80   Temp 98.6 °F  (37 °C) (Temporal)   Resp 23   Ht 1.702 m (5' 7\")   Wt 56.2 kg (123 lb 14.4 oz)   SpO2 95%   BMI 19.41 kg/m²   General appearance: appears stated age   Skin:  No rashes or lesions  HEENT: Head: Normocephalic, no lesions, without obvious abnormality.  Neck: no adenopathy, no carotid bruit, no JVD, supple, symmetrical, trachea midline, and thyroid not enlarged, symmetric, no tenderness/mass/nodules  Lungs: diminished breath sounds bibasilar  Heart: regular rate and rhythm, S1, S2 normal, no murmur, click, rub or gallop  Abdomen: soft, non-tender; bowel sounds normal; no masses,  no organomegaly  Extremities: extremities normal, atraumatic, no cyanosis or edema  Neurologic: Mental status: Alert, oriented, thought content appropriate    Assessment:   Patient Active Problem List:     Essential hypertension     Generalized anxiety disorder     Gastroesophageal reflux disease without esophagitis     H/O alcohol abuse     Cervical dysplasia     Alcohol withdrawal delirium (HCC)     JARRELL (acute kidney injury) (HCC)     Hyponatremia    Plan:       Assessment and Plans:     Hyponatremia, hypotonic  patient on lisinopril-HCTZ prior to admission  History of ETOH abuse  Sodium 118 on presentation, corrected to 132 over several days  Serum osmolality 259 on arrival   Urine sodium 25, urine urea 411, urine creatinine 111.2  TSH 0.22 and cortisol 34.3  Uric acid 8.1  Strict I&O       2.  JARRELL stage III resolving    Due to decreased effective renal perfusion as patient was on lisinopril-HCTZ prior to admission  Baseline creatinine 0.5  Creatinine peaked at 2.6 on 3/13--> 1.8 --> 1.0 today  FENa 0.4% and FEUrea 16.2% supports prerenal etiology  Retroperitoneal US 3/14 unremarkable US of kidneys/bladder  Strict I&O, daily weights  Avoid nephrotoxins/NSAIDs  Lisinopril-HCTZ on hold; no restart at discharge  Monitor labs     3.  Hypokalemia and hypermagnesemia  S/p potassium   Both Mg and K+ better        4.  Intermittent

## 2024-03-16 NOTE — PROGRESS NOTES
Department of Internal Medicine  Infectious Diseases  Progress  Note      C/C : Leukocytosis , Pneumonia      Pt is awake and alert  Denies fever or chills  Denies SOB, cough, chest pain   Afebrile       Current Facility-Administered Medications   Medication Dose Route Frequency Provider Last Rate Last Admin    potassium phosphate 10 mmol in sodium chloride 0.9 % 250 mL IVPB  10 mmol IntraVENous Once Gita Riley APRN -  mL/hr at 03/16/24 0918 10 mmol at 03/16/24 0918    multivitamin 1 tablet  1 tablet Oral Daily Lee García DO   1 tablet at 03/16/24 0742    ammonium lactate (LAC-HYDRIN) 12 % lotion   Topical Daily Lee García DO   Given at 03/16/24 0742    folic acid (FOLVITE) tablet 1 mg  1 mg Oral Daily Pepper Kruger APRN - CNP   1 mg at 03/16/24 0742    [Held by provider] metoprolol tartrate (LOPRESSOR) tablet 25 mg  25 mg Oral BID Pepper Kruger APRN - CNP        sodium chloride flush 0.9 % injection 5-40 mL  5-40 mL IntraVENous 2 times per day Pepper Kruger APRN - CNP   10 mL at 03/16/24 0742    sodium chloride flush 0.9 % injection 5-40 mL  5-40 mL IntraVENous PRN Pepper Kruger APRN - CNP        ondansetron (ZOFRAN-ODT) disintegrating tablet 4 mg  4 mg Oral Q8H PRN Pepper Kruger APRN - CNP        Or    ondansetron (ZOFRAN) injection 4 mg  4 mg IntraVENous Q6H PRN Pepper Kruger APRN - CNP        acetaminophen (TYLENOL) tablet 650 mg  650 mg Oral Q6H PRN Pepper Kruger APRN - CNP        Or    acetaminophen (TYLENOL) suppository 650 mg  650 mg Rectal Q6H PRN Pepper Kruger APRN - CNP        heparin (porcine) injection 5,000 Units  5,000 Units SubCUTAneous Q8H Gita Riley APRN - CNP   5,000 Units at 03/16/24 0743    mineral oil-hydrophilic petrolatum (HYDROPHOR) ointment   Topical BID PRN Gita Riley APRN - CNP        medicated lip balm (BLISTEX/CARMEX) stick   Topical PRN Gita Riley, BELEM - CNP        cefTRIAXone (ROCEPHIN) 1,000 mg in  03:22 AM    MUCUS PRESENT 03/14/2024 03:22 AM    BACTERIA 1+ 03/14/2024 03:22 AM    CLARITYU Clear 02/07/2022 03:15 PM    SPECGRAV 1.020 03/14/2024 03:22 AM    LEUKOCYTESUR NEGATIVE 03/14/2024 03:22 AM    UROBILINOGEN 0.2 03/14/2024 03:22 AM    BILIRUBINUR SMALL 03/14/2024 03:22 AM    BLOODU Negative 02/07/2022 03:15 PM    GLUCOSEU NEGATIVE 03/14/2024 03:22 AM       ABG:  No results found for: \"AYY7WLF\", \"BEART\", \"H7KSYSQU\", \"PHART\", \"THGBART\", \"QDN3RRY\", \"PO2ART\", \"HDJ5UMS\"    MICROBIOLOGY:    Blood culture - negative     Resp panel negative         Legionella Pneumophilia Ag, Urine NEGATIVE     Comment:       L. pneumophila serogroup 1 antigen not detected.  A negative result does not exclude infection with Leginella pnemophila serogroup 1 nor does  it rule out other microbial-caused respiratory infections of disease caused by other  serogroups of Legionella pneumophila.      STREP PNEUMONIAE ANTIGEN [[id number]] Collected: 03/14/24 0322   Order Status: Completed Specimen: Urine, clean catch Updated: 03/14/24 1002    Source Unknown    Strep pneumo Ag NEGATIVE    Comment:               Procalcitonin 1.17     Radiology :    Chest X ray      1. Left perihilar airspace disease  2. The right lung is clear  3. Cardiomegaly.     Echo -   Left Ventricle: Normal left ventricular systolic function with a visually estimated EF of 55 - 60%. Left ventricle size is normal. Normal wall thickness. Normal wall motion. Indeterminate diastolic function.    Right Ventricle: Right ventricle size is normal. Normal systolic function.    Tricuspid Valve: Mildly elevated RVSP, consistent with mild pulmonary hypertension. Est RA pressure is 3 mmHg. The estimated PASP is 39 mmHg.    Left Atrium: Left atrium size is normal.    Right Atrium: Right atrium size is normal.    Aorta: Normal sized aortic root. Mildly dilated ascending aorta. Ao ascending diameter is 3.9 cm.    Image quality is adequate.      IMPRESSION:     CAP   Leukocytosis (

## 2024-03-16 NOTE — PROGRESS NOTES
PPI  Social service consult for discharge planning   Check thyroid antibodies  Supplement lyts    CCt33  Lee García DO, MPH, FCCP, FACP, MACOI  Professor of Internal Medicine

## 2024-03-17 LAB
ALBUMIN SERPL-MCNC: 3.9 G/DL (ref 3.5–5.2)
ALP SERPL-CCNC: 90 U/L (ref 35–104)
ALT SERPL-CCNC: 14 U/L (ref 0–32)
ANION GAP SERPL CALCULATED.3IONS-SCNC: 14 MMOL/L (ref 7–16)
AST SERPL-CCNC: 17 U/L (ref 0–31)
BASOPHILS # BLD: 0.02 K/UL (ref 0–0.2)
BASOPHILS NFR BLD: 1 % (ref 0–2)
BILIRUB SERPL-MCNC: 1.3 MG/DL (ref 0–1.2)
BUN SERPL-MCNC: 15 MG/DL (ref 6–23)
CALCIUM SERPL-MCNC: 9.3 MG/DL (ref 8.6–10.2)
CHLORIDE SERPL-SCNC: 95 MMOL/L (ref 98–107)
CO2 SERPL-SCNC: 23 MMOL/L (ref 22–29)
CREAT SERPL-MCNC: 0.6 MG/DL (ref 0.5–1)
EOSINOPHIL # BLD: 0.09 K/UL (ref 0.05–0.5)
EOSINOPHILS RELATIVE PERCENT: 2 % (ref 0–6)
ERYTHROCYTE [DISTWIDTH] IN BLOOD BY AUTOMATED COUNT: 12.1 % (ref 11.5–15)
GFR SERPL CREATININE-BSD FRML MDRD: >60 ML/MIN/1.73M2
GLUCOSE SERPL-MCNC: 93 MG/DL (ref 74–99)
HCT VFR BLD AUTO: 33.9 % (ref 34–48)
HGB BLD-MCNC: 12 G/DL (ref 11.5–15.5)
IMM GRANULOCYTES # BLD AUTO: 0.04 K/UL (ref 0–0.58)
IMM GRANULOCYTES NFR BLD: 1 % (ref 0–5)
LYMPHOCYTES NFR BLD: 0.7 K/UL (ref 1.5–4)
LYMPHOCYTES RELATIVE PERCENT: 17 % (ref 20–42)
MAGNESIUM SERPL-MCNC: 1.4 MG/DL (ref 1.6–2.6)
MCH RBC QN AUTO: 34.2 PG (ref 26–35)
MCHC RBC AUTO-ENTMCNC: 35.4 G/DL (ref 32–34.5)
MCV RBC AUTO: 96.6 FL (ref 80–99.9)
MONOCYTES NFR BLD: 0.51 K/UL (ref 0.1–0.95)
MONOCYTES NFR BLD: 12 % (ref 2–12)
NEUTROPHILS NFR BLD: 67 % (ref 43–80)
NEUTS SEG NFR BLD: 2.76 K/UL (ref 1.8–7.3)
PHOSPHATE SERPL-MCNC: 3.1 MG/DL (ref 2.5–4.5)
PLATELET # BLD AUTO: 204 K/UL (ref 130–450)
PMV BLD AUTO: 9.8 FL (ref 7–12)
POTASSIUM SERPL-SCNC: 3.8 MMOL/L (ref 3.5–5)
PROT SERPL-MCNC: 6.5 G/DL (ref 6.4–8.3)
RBC # BLD AUTO: 3.51 M/UL (ref 3.5–5.5)
SODIUM SERPL-SCNC: 132 MMOL/L (ref 132–146)
WBC OTHER # BLD: 4.1 K/UL (ref 4.5–11.5)

## 2024-03-17 PROCEDURE — 80053 COMPREHEN METABOLIC PANEL: CPT

## 2024-03-17 PROCEDURE — 6360000002 HC RX W HCPCS

## 2024-03-17 PROCEDURE — 2580000003 HC RX 258

## 2024-03-17 PROCEDURE — 6370000000 HC RX 637 (ALT 250 FOR IP)

## 2024-03-17 PROCEDURE — 6370000000 HC RX 637 (ALT 250 FOR IP): Performed by: INTERNAL MEDICINE

## 2024-03-17 PROCEDURE — 85025 COMPLETE CBC W/AUTO DIFF WBC: CPT

## 2024-03-17 PROCEDURE — 99232 SBSQ HOSP IP/OBS MODERATE 35: CPT | Performed by: INTERNAL MEDICINE

## 2024-03-17 PROCEDURE — 84100 ASSAY OF PHOSPHORUS: CPT

## 2024-03-17 PROCEDURE — 83735 ASSAY OF MAGNESIUM: CPT

## 2024-03-17 PROCEDURE — 2060000000 HC ICU INTERMEDIATE R&B

## 2024-03-17 PROCEDURE — 6360000002 HC RX W HCPCS: Performed by: INTERNAL MEDICINE

## 2024-03-17 PROCEDURE — 2580000003 HC RX 258: Performed by: INTERNAL MEDICINE

## 2024-03-17 PROCEDURE — 99233 SBSQ HOSP IP/OBS HIGH 50: CPT | Performed by: INTERNAL MEDICINE

## 2024-03-17 RX ORDER — LANOLIN ALCOHOL/MO/W.PET/CERES
400 CREAM (GRAM) TOPICAL DAILY
Status: DISCONTINUED | OUTPATIENT
Start: 2024-03-18 | End: 2024-03-20

## 2024-03-17 RX ORDER — METHIMAZOLE 5 MG/1
5 TABLET ORAL 3 TIMES DAILY
Status: DISCONTINUED | OUTPATIENT
Start: 2024-03-17 | End: 2024-03-19

## 2024-03-17 RX ORDER — MAGNESIUM SULFATE IN WATER 40 MG/ML
4000 INJECTION, SOLUTION INTRAVENOUS ONCE
Status: COMPLETED | OUTPATIENT
Start: 2024-03-17 | End: 2024-03-17

## 2024-03-17 RX ORDER — POTASSIUM CHLORIDE 20 MEQ/1
20 TABLET, EXTENDED RELEASE ORAL ONCE
Status: COMPLETED | OUTPATIENT
Start: 2024-03-17 | End: 2024-03-17

## 2024-03-17 RX ADMIN — HYDROPHOR: 42 OINTMENT TOPICAL at 12:10

## 2024-03-17 RX ADMIN — CEFTRIAXONE SODIUM 1000 MG: 1 INJECTION, POWDER, FOR SOLUTION INTRAMUSCULAR; INTRAVENOUS at 12:09

## 2024-03-17 RX ADMIN — METHIMAZOLE 5 MG: 5 TABLET ORAL at 12:09

## 2024-03-17 RX ADMIN — Medication 5 MG: at 20:13

## 2024-03-17 RX ADMIN — SODIUM BICARBONATE 650 MG: 650 TABLET ORAL at 08:13

## 2024-03-17 RX ADMIN — SODIUM BICARBONATE 650 MG: 650 TABLET ORAL at 12:13

## 2024-03-17 RX ADMIN — HEPARIN SODIUM 5000 UNITS: 10000 INJECTION INTRAVENOUS; SUBCUTANEOUS at 17:06

## 2024-03-17 RX ADMIN — SODIUM CHLORIDE, PRESERVATIVE FREE 10 ML: 5 INJECTION INTRAVENOUS at 09:52

## 2024-03-17 RX ADMIN — SODIUM BICARBONATE 650 MG: 650 TABLET ORAL at 20:13

## 2024-03-17 RX ADMIN — MULTIVITAMIN TABLET 1 TABLET: TABLET at 08:13

## 2024-03-17 RX ADMIN — POTASSIUM CHLORIDE 20 MEQ: 1500 TABLET, EXTENDED RELEASE ORAL at 09:51

## 2024-03-17 RX ADMIN — HEPARIN SODIUM 5000 UNITS: 10000 INJECTION INTRAVENOUS; SUBCUTANEOUS at 08:13

## 2024-03-17 RX ADMIN — MAGNESIUM SULFATE IN WATER 4000 MG: 40 INJECTION, SOLUTION INTRAVENOUS at 10:26

## 2024-03-17 RX ADMIN — HEPARIN SODIUM 5000 UNITS: 10000 INJECTION INTRAVENOUS; SUBCUTANEOUS at 02:21

## 2024-03-17 RX ADMIN — Medication 1 MG: at 08:13

## 2024-03-17 RX ADMIN — Medication: at 09:51

## 2024-03-17 RX ADMIN — METHIMAZOLE 5 MG: 5 TABLET ORAL at 20:12

## 2024-03-17 RX ADMIN — SODIUM CHLORIDE, PRESERVATIVE FREE 10 ML: 5 INJECTION INTRAVENOUS at 20:13

## 2024-03-17 ASSESSMENT — PAIN SCALES - GENERAL: PAINLEVEL_OUTOF10: 0

## 2024-03-17 NOTE — PROGRESS NOTES
Hospitalist Progress Note      PCP: William Olea MD    Date of Admission: 3/13/2024        Hospital Course:   63 y.o. female presented with HYPERNATREMIA,  SHE IS A POOR HISTORIAN, AND DOES NOT KNOW WHY SHE IS HERE. SHE LIVES ALONE AND APPARENTLY WAS FOUND ON THE FLOOD.. ? ETOH ABUSE     3/17 HYPERTHYROID, STARTED ON TAPAZOL        Subjective:  PLEASANTLY CONFUSED           Medications:  Reviewed    Infusion Medications    sodium chloride       Scheduled Medications    [START ON 3/18/2024] magnesium oxide  400 mg Oral Daily    methIMAzole  5 mg Oral TID    sodium bicarbonate  650 mg Oral TID    multivitamin  1 tablet Oral Daily    ammonium lactate   Topical Daily    folic acid  1 mg Oral Daily    [Held by provider] metoprolol tartrate  25 mg Oral BID    sodium chloride flush  5-40 mL IntraVENous 2 times per day    heparin (porcine)  5,000 Units SubCUTAneous Q8H    cefTRIAXone (ROCEPHIN) IV  1,000 mg IntraVENous Q24H     PRN Meds: melatonin, sodium chloride flush, ondansetron **OR** ondansetron, acetaminophen **OR** acetaminophen, mineral oil-hydrophilic petrolatum, medicated lip balm, sodium chloride      Intake/Output Summary (Last 24 hours) at 3/17/2024 1604  Last data filed at 3/16/2024 1700  Gross per 24 hour   Intake 120 ml   Output --   Net 120 ml       Exam:    BP (!) 153/101   Pulse 99   Temp 97.1 °F (36.2 °C)   Resp 17   Ht 1.702 m (5' 7\")   Wt 56.2 kg (123 lb 14.4 oz)   SpO2 96%   BMI 19.41 kg/m²       General appearance:  No apparent distress,   HEENT:  Normal cephalic,   Neck: Supple, with full range of motion.  Trachea midline.  Respiratory:  Normal respiratory effort. Clear to auscultation,   Cardiovascular:  RRR  Abdomen: Soft, non-tender, non-distended with normal bowel sounds.  Musculoskeletal:  No clubbing, cyanosis or edema bilaterally.    Skin: smooth and dry   Neurologic:   Cranial nerves: II-XII intact,   Psychiatric:  Alert and oriented x 1                Labs:   Recent Labs

## 2024-03-17 NOTE — PROGRESS NOTES
1.8  We recommend starting methimazole 5 mg 3 times daily  No clear signs of Graves' disease on physical examination.    TSI, TPO antibody, and antithyroglobulin body are pending  We will obtain free T4 daily to assess the response to the treatment    Interdisciplinary plan for communication with healthcare providers:   Consult recommendations were discussed with the Primary Service/Nursing staff      The above issues were reviewed with the patient who understood and agreed with the plan.  Thank you for allowing us to participate in the care of this patient. Please do not hesitate to contact us with any additional questions.     Sandeep Hughes MD  Endocrinologist, [facility]   [address] [place] [id number]   Phone: [phone]  Fax: [phone]  ---------------------------------  An electronic signature was used to authenticate this note. Sandeep Hughes MD on 3/17/2024 at 11:02 AM

## 2024-03-17 NOTE — PROGRESS NOTES
Nephrology Progress Note  3/17/2024 7:17 AM  Subjective:   Admit Date: 3/13/2024  PCP: William Olea MD  Interval History:     3/17/24: No new autre issues from overnight; she denies SOB        Diet: ADULT DIET; Regular; Low Phosphorus (Less than 1000 mg); No Fluids on Tray    Data:   Scheduled Meds:   sodium bicarbonate  650 mg Oral TID    multivitamin  1 tablet Oral Daily    ammonium lactate   Topical Daily    folic acid  1 mg Oral Daily    [Held by provider] metoprolol tartrate  25 mg Oral BID    sodium chloride flush  5-40 mL IntraVENous 2 times per day    heparin (porcine)  5,000 Units SubCUTAneous Q8H    cefTRIAXone (ROCEPHIN) IV  1,000 mg IntraVENous Q24H     Continuous Infusions:   sodium chloride       PRN Meds:melatonin, sodium chloride flush, ondansetron **OR** ondansetron, acetaminophen **OR** acetaminophen, mineral oil-hydrophilic petrolatum, medicated lip balm, sodium chloride  I/O last 3 completed shifts:  In: 842.8 [P.O.:560; IV Piggyback:282.8]  Out: 770 [Urine:770]  No intake/output data recorded.    Intake/Output Summary (Last 24 hours) at 3/17/2024 0717  Last data filed at 3/16/2024 1700  Gross per 24 hour   Intake 842.77 ml   Output --   Net 842.77 ml     CBC:   Recent Labs     03/15/24  0503 03/16/24  0405 03/17/24  0436   WBC 6.3 5.8 4.1*   HGB 9.6* 11.9 12.0    201 204     BMP:    Recent Labs     03/15/24  0503 03/16/24  0405 03/17/24  0436    132 132   K 3.8 3.6 3.8    95* 95*   CO2 18* 26 23   BUN 31* 19 15   CREATININE 1.0 0.7 0.6   GLUCOSE 87 89 93     Hepatic:   Recent Labs     03/15/24  0503 03/16/24  0405 03/17/24  0436   AST 19 22 17   ALT 12 13 14   BILITOT 2.0* 2.0* 1.3*   ALKPHOS 79 105* 90     Troponin: No results for input(s): \"TROPONINI\" in the last 72 hours.  BNP: No results for input(s): \"BNP\" in the last 72 hours.  Lipids: No results for input(s): \"CHOL\", \"HDL\" in the last 72 hours.    Invalid input(s): \"LDLCALCU\"  ABGs: No results found for: \"PHART\",  Plan:    We will recheck a few labs today and notify you of the results.   Continue the Gabapentin for foot discomfort/tinglinmg at bedtime.  Return to Neurology in 6 months. Let us know if any concerns arise in the meantime.    and/or weight based adjustment of the mA/kV was utilized to reduce the radiation dose to as low as reasonably achievable. COMPARISON: None. HISTORY: ORDERING SYSTEM PROVIDED HISTORY: abdominal discomfort, worsening renal TECHNOLOGIST PROVIDED HISTORY: Reason for exam:->abdominal discomfort, worsening renal Additional Contrast?->None Decision Support Exception - unselect if not a suspected or confirmed emergency medical condition->Emergency Medical Condition (MA) What reading provider will be dictating this exam?->CRC FINDINGS: Lower Chest: Visualized lungs are normal. Organs:   The liver, spleen, adrenal glands, right kidney, pancreas and gallbladder are normal.  Left renal atrophy. GI/Bowel:   Normal large and small bowel.  The appendix is not visualized. Pelvis: Calcified uterine fibroids.  Normal urinary bladder. Peritoneum/Retroperitoneum: No free fluid or free air. Bones/Soft Tissues: Degenerative changes involving the hip joints and lumbar spine.     No definitive findings to explain the patient's abdominal pain. Left renal atrophy.       XR CHEST PORTABLE    Result Date: 3/13/2024  EXAMINATION: ONE XRAY VIEW OF THE CHEST 3/13/2024 4:05 pm COMPARISON: 02/15/2024 HISTORY: ORDERING SYSTEM PROVIDED HISTORY: shortness of breath TECHNOLOGIST PROVIDED HISTORY: Reason for exam:->shortness of breath What reading provider will be dictating this exam?->CRC FINDINGS: The heart is enlarged.  No mediastinal widening is noted The right lung is clear Left perihilar airspace disease is noted. There is no pneumothorax.     1. Left perihilar airspace disease 2. The right lung is clear 3. Cardiomegaly.       Objective:   Vitals: /75   Pulse 74   Temp 98.4 °F (36.9 °C) (Temporal)   Resp 20   Ht 1.702 m (5' 7\")   Wt 56.2 kg (123 lb 14.4 oz)   SpO2 99%   BMI 19.41 kg/m²   General appearance: appears stated age   Skin:  No rashes or lesions  HEENT: Head: Normocephalic, no lesions, without obvious abnormality.  Neck: no

## 2024-03-17 NOTE — PLAN OF CARE
Problem: Safety - Adult  Goal: Free from fall injury  Outcome: Progressing     Problem: Discharge Planning  Goal: Discharge to home or other facility with appropriate resources  Outcome: Progressing     Problem: Pain  Goal: Verbalizes/displays adequate comfort level or baseline comfort level  Outcome: Progressing     Problem: Skin/Tissue Integrity  Goal: Absence of new skin breakdown  Description: 1.  Monitor for areas of redness and/or skin breakdown  2.  Assess vascular access sites hourly  3.  Every 4-6 hours minimum:  Change oxygen saturation probe site  4.  Every 4-6 hours:  If on nasal continuous positive airway pressure, respiratory therapy assess nares and determine need for appliance change or resting period.  Outcome: Progressing     Problem: Cardiovascular - Adult  Goal: Absence of cardiac dysrhythmias or at baseline  Outcome: Progressing     Problem: Infection - Adult  Goal: Absence of infection at discharge  Outcome: Progressing  Goal: Absence of infection during hospitalization  Outcome: Progressing     Problem: Metabolic/Fluid and Electrolytes - Adult  Goal: Electrolytes maintained within normal limits  Outcome: Progressing     Problem: Hematologic - Adult  Goal: Maintains hematologic stability  Outcome: Progressing     Problem: Neurosensory - Adult  Goal: Achieves stable or improved neurological status  Outcome: Not Progressing

## 2024-03-17 NOTE — PROGRESS NOTES
Physician Progress Note      PATIENT:               TAYE BEARDEN  [facility] #:                  [id number]  :                       1960  ADMIT DATE:       3/13/2024 3:14 PM  DISCH DATE:  RESPONDING  PROVIDER #:        Nash Copeland DO          QUERY TEXT:    Pt admitted with pneumonia.  Noted documentation of sepsis by ordered ICU   consultant.  If possible, please document in progress notes and discharge   summary:    The medical record reflects the following:  Risk Factors: AMS, Hyponatremia, Pneumonia  Clinical Indicators: Lab findings on admit WBC 16.8 11.7 procal 1.49 Lactic   2.3; VS findings ----, 95, 16, 88/54, 96% 3LNC ---, 81, 13, 83/59; per ICU    Acute metabolic encephalopathy -Sepsis with UTI; Per ID consult CAP,   leukocytosis elevated procal  Treatment: ID Consult, IV antibiotics, Continued monitoring in the ICU    Thank you  Angela REDNON, RN, CCDS  Clinical Documentation Improvement  Options provided:  -- sepsis confirmed present on admission  -- sepsis confirmed not present on admission  -- sepsis ruled out  -- Other - I will add my own diagnosis  -- Disagree - Not applicable / Not valid  -- Disagree - Clinically unable to determine / Unknown  -- Refer to Clinical Documentation Reviewer    PROVIDER RESPONSE TEXT:    The diagnosis of sepsis was confirmed as present on admission.    Query created by: Angela Abdul on 3/15/2024 7:27 AM      Electronically signed by:  Nash Copeland DO 3/17/2024 11:20 AM

## 2024-03-17 NOTE — PROGRESS NOTES
Department of Internal Medicine  Infectious Diseases  Progress  Note      C/C : Leukocytosis , Pneumonia      Pt is awake and alert  Feels better   Denies fever or chills     Afebrile       Current Facility-Administered Medications   Medication Dose Route Frequency Provider Last Rate Last Admin    magnesium sulfate 4000 mg in 100 mL IVPB premix  4,000 mg IntraVENous Once Gita Riley APRN - CNP        [START ON 3/18/2024] magnesium oxide (MAG-OX) tablet 400 mg  400 mg Oral Daily Gita Riley APRN - CNP        sodium bicarbonate tablet 650 mg  650 mg Oral TID Faraz Sullivan MD   650 mg at 03/17/24 0813    melatonin disintegrating tablet 5 mg  5 mg Oral Nightly PRN Gita Riley APRN - CNP   5 mg at 03/16/24 2156    multivitamin 1 tablet  1 tablet Oral Daily Lee García DO   1 tablet at 03/17/24 0813    ammonium lactate (LAC-HYDRIN) 12 % lotion   Topical Daily Lee aGrcía DO   Given at 03/17/24 0951    folic acid (FOLVITE) tablet 1 mg  1 mg Oral Daily Pepper Kruger APRN - CNP   1 mg at 03/17/24 0813    [Held by provider] metoprolol tartrate (LOPRESSOR) tablet 25 mg  25 mg Oral BID Pepper Kruger APRN - CNP        sodium chloride flush 0.9 % injection 5-40 mL  5-40 mL IntraVENous 2 times per day Pepper Kruger APRN - CNP   10 mL at 03/17/24 0952    sodium chloride flush 0.9 % injection 5-40 mL  5-40 mL IntraVENous PRN Pepper Kruger APRN - CNP        ondansetron (ZOFRAN-ODT) disintegrating tablet 4 mg  4 mg Oral Q8H PRN Pepper Kruger APRN - CNP        Or    ondansetron (ZOFRAN) injection 4 mg  4 mg IntraVENous Q6H PRN Pepper Kruger APRN - CNP        acetaminophen (TYLENOL) tablet 650 mg  650 mg Oral Q6H PRN Pepper Kruger APRN - CNP        Or    acetaminophen (TYLENOL) suppository 650 mg  650 mg Rectal Q6H PRN Pepper Kruger APRN - CNP        heparin (porcine) injection 5,000 Units  5,000 Units SubCUTAneous Q8H Gita Riley APRN - CNP   5,000 Units at  TO 5 03/14/2024 03:22 AM    RBCUA 0 TO 2 03/14/2024 03:22 AM    MUCUS PRESENT 03/14/2024 03:22 AM    BACTERIA 1+ 03/14/2024 03:22 AM    CLARITYU Clear 02/07/2022 03:15 PM    SPECGRAV 1.020 03/14/2024 03:22 AM    LEUKOCYTESUR NEGATIVE 03/14/2024 03:22 AM    UROBILINOGEN 0.2 03/14/2024 03:22 AM    BILIRUBINUR SMALL 03/14/2024 03:22 AM    BLOODU Negative 02/07/2022 03:15 PM    GLUCOSEU NEGATIVE 03/14/2024 03:22 AM       ABG:  No results found for: \"YFA9AED\", \"BEART\", \"U0DQZTOR\", \"PHART\", \"THGBART\", \"HCP7MJE\", \"PO2ART\", \"ONK0SMT\"    MICROBIOLOGY:    Blood culture - negative     Resp panel negative         Legionella Pneumophilia Ag, Urine NEGATIVE     Comment:       L. pneumophila serogroup 1 antigen not detected.  A negative result does not exclude infection with Leginella pnemophila serogroup 1 nor does  it rule out other microbial-caused respiratory infections of disease caused by other  serogroups of Legionella pneumophila.      STREP PNEUMONIAE ANTIGEN [[id number]] Collected: 03/14/24 0322   Order Status: Completed Specimen: Urine, clean catch Updated: 03/14/24 1002    Source Unknown    Strep pneumo Ag NEGATIVE    Comment:               Procalcitonin 1.17     Radiology :    Chest X ray      1. Left perihilar airspace disease  2. The right lung is clear  3. Cardiomegaly.     Echo -   Left Ventricle: Normal left ventricular systolic function with a visually estimated EF of 55 - 60%. Left ventricle size is normal. Normal wall thickness. Normal wall motion. Indeterminate diastolic function.    Right Ventricle: Right ventricle size is normal. Normal systolic function.    Tricuspid Valve: Mildly elevated RVSP, consistent with mild pulmonary hypertension. Est RA pressure is 3 mmHg. The estimated PASP is 39 mmHg.    Left Atrium: Left atrium size is normal.    Right Atrium: Right atrium size is normal.    Aorta: Normal sized aortic root. Mildly dilated ascending aorta. Ao ascending diameter is 3.9 cm.    Image quality is

## 2024-03-17 NOTE — PROGRESS NOTES
[facility]  Internal Medicine Department  Division of Pulmonary, Critical Care and Sleep Medicine  Daily Intensive Care Unit Progress  Note    Admit Date: 3/13/2024    MRN: [id number]        Patient:  Abbie BUCKLEY Niece 63 y.o. female     PCP: William Olea MD    Date of Service: 3/17/2024      Allergy: Patient has no known allergies.    Subjective   Length of stay during current admission: 3        Still trying to get out of bed frequently without assistance.  Overall though without any acute issues overnight.    Physical Exam:   VITALS:  BP (!) 137/99   Pulse (!) 101   Temp 97.7 °F (36.5 °C)   Resp 18   Ht 1.702 m (5' 7\")   Wt 56.2 kg (123 lb 14.4 oz)   SpO2 98%   BMI 19.41 kg/m²   8HR INTAKE OUTPUT:  No intake/output data recorded.  General: Alert  HEENT: Conjunctiva/corneas clear, No icterus. No exudates.   Neck: Supple, No JVD  Chest wall: Symmetric excursion  Lung: Course to auscultation No rales or wheezing  Heart: Regular rate and rhythm, No murmur, rub or  gallop.   Abdomen: BS +, soft, no rigidity, rebound or guarding  Extremities: Trace edema. Palp pulses.   Skin: No rashes, dry skin   Musculokeletal: No trauma signs   Lymph nodes: No adenopathy  Neurologic: Non focal examination    Medications   Home and Current medications were discussed & reviewed on rounds with team and pharmacy liaison.  Labs &  Imaging Studies   The data collected below information that was obtained, reviewed, analyzed and interpreted today. Imaging test are reviewed with the radiologist during rounds. Comparison to previous images are always explored.     CBC:   Lab Results   Component Value Date/Time    WBC 4.1 03/17/2024 04:36 AM    RBC 3.51 03/17/2024 04:36 AM    HGB 12.0 03/17/2024 04:36 AM    HCT 33.9 03/17/2024 04:36 AM     03/17/2024 04:36 AM    MCV 96.6 03/17/2024 04:36 AM       BMP:    Lab Results   Component Value Date/Time     03/17/2024 04:36 AM    K 3.8 03/17/2024 04:36 AM    CL 95  from facility)   JARRELL Stage III most likely volume responsive prerenal JARRELL vs. ATN in the setting of hypotension with ACE inhibition/diuretic administration  Troponin leak, most likely systemic 2/2 JARRELL vs. CHF  HAGMA 2/2 JARRELL and lactic acidosis? Starvation ketoacidosis?  HTN now with hypotension  Sepsis, procalcitonin 1.49, UA with UTI results pending   Hx of Falls  GERD, small hiatal hernia  IBS- by history  Tremors chronic   Concerns for rectal bleeding, reported to have hemorrhoids       Plan   Family was updated at the bedside if present and available. Key issues of the case were discussed among consultants.  Critical Care time is documented if appropriate.    Nephrology for Na assessment -following and discussed  Renal Ultrasound show one smaller kidney   Continue to monitor neurovascular status  Seizure precautions  MRSA nares = negative  Continue to monitor sodium level, check reduced to daily   Obtain cortisol, = ok  Continue to monitor renal function  Hold home antihypertensives and SSRI as HCTZ and all SSRI cause low Na  ID change abx for Rocephin   F/U on  Echo = normal   Obtain pan cultures, lactic acid, continue empiric antibiotics - all negative so far.  Low TSH will get T4, free T4 was high - will ask endocrine to comment given her status, clinically doesnt apper to be hyperthyroid?  Note reviewed TSH repeat is still low  Continue thiamine and folic acid  DVT prophylaxis: Heparin Sub Q  GI prophylaxis: PPI  Social service consult for discharge planning   Check thyroid antibodies  Supplement lyts    Okay to transfer to floor    Jewell Gant, DO

## 2024-03-18 PROBLEM — E05.90 HYPERTHYROIDISM: Status: ACTIVE | Noted: 2024-03-18

## 2024-03-18 LAB
ALBUMIN SERPL-MCNC: 4 G/DL (ref 3.5–5.2)
ALP SERPL-CCNC: 99 U/L (ref 35–104)
ALT SERPL-CCNC: 14 U/L (ref 0–32)
ANION GAP SERPL CALCULATED.3IONS-SCNC: 12 MMOL/L (ref 7–16)
AST SERPL-CCNC: 19 U/L (ref 0–31)
BASOPHILS # BLD: 0.03 K/UL (ref 0–0.2)
BASOPHILS NFR BLD: 1 % (ref 0–2)
BILIRUB SERPL-MCNC: 1.1 MG/DL (ref 0–1.2)
BUN SERPL-MCNC: 10 MG/DL (ref 6–23)
CALCIUM SERPL-MCNC: 9.5 MG/DL (ref 8.6–10.2)
CHLORIDE SERPL-SCNC: 92 MMOL/L (ref 98–107)
CO2 SERPL-SCNC: 23 MMOL/L (ref 22–29)
CREAT SERPL-MCNC: 0.6 MG/DL (ref 0.5–1)
EOSINOPHIL # BLD: 0.09 K/UL (ref 0.05–0.5)
EOSINOPHILS RELATIVE PERCENT: 2 % (ref 0–6)
ERYTHROCYTE [DISTWIDTH] IN BLOOD BY AUTOMATED COUNT: 11.9 % (ref 11.5–15)
GFR SERPL CREATININE-BSD FRML MDRD: >60 ML/MIN/1.73M2
GLUCOSE SERPL-MCNC: 94 MG/DL (ref 74–99)
HCT VFR BLD AUTO: 36.4 % (ref 34–48)
HGB BLD-MCNC: 12.6 G/DL (ref 11.5–15.5)
IMM GRANULOCYTES # BLD AUTO: 0.04 K/UL (ref 0–0.58)
IMM GRANULOCYTES NFR BLD: 1 % (ref 0–5)
LYMPHOCYTES NFR BLD: 0.79 K/UL (ref 1.5–4)
LYMPHOCYTES RELATIVE PERCENT: 14 % (ref 20–42)
MAGNESIUM SERPL-MCNC: 1.6 MG/DL (ref 1.6–2.6)
MCH RBC QN AUTO: 33.6 PG (ref 26–35)
MCHC RBC AUTO-ENTMCNC: 34.6 G/DL (ref 32–34.5)
MCV RBC AUTO: 97.1 FL (ref 80–99.9)
MONOCYTES NFR BLD: 0.59 K/UL (ref 0.1–0.95)
MONOCYTES NFR BLD: 10 % (ref 2–12)
NEUTROPHILS NFR BLD: 73 % (ref 43–80)
NEUTS SEG NFR BLD: 4.21 K/UL (ref 1.8–7.3)
PHOSPHATE SERPL-MCNC: 2.9 MG/DL (ref 2.5–4.5)
PLATELET # BLD AUTO: 218 K/UL (ref 130–450)
PMV BLD AUTO: 9.7 FL (ref 7–12)
POTASSIUM SERPL-SCNC: 4.1 MMOL/L (ref 3.5–5)
PROT SERPL-MCNC: 6.4 G/DL (ref 6.4–8.3)
RBC # BLD AUTO: 3.75 M/UL (ref 3.5–5.5)
SODIUM SERPL-SCNC: 127 MMOL/L (ref 132–146)
THYROID STIMULATING IMMUNOGLOB: <0.1 IU/L
WBC OTHER # BLD: 5.8 K/UL (ref 4.5–11.5)

## 2024-03-18 PROCEDURE — 6360000002 HC RX W HCPCS

## 2024-03-18 PROCEDURE — 6370000000 HC RX 637 (ALT 250 FOR IP): Performed by: INTERNAL MEDICINE

## 2024-03-18 PROCEDURE — 83735 ASSAY OF MAGNESIUM: CPT

## 2024-03-18 PROCEDURE — 6370000000 HC RX 637 (ALT 250 FOR IP)

## 2024-03-18 PROCEDURE — 36415 COLL VENOUS BLD VENIPUNCTURE: CPT

## 2024-03-18 PROCEDURE — 2060000000 HC ICU INTERMEDIATE R&B

## 2024-03-18 PROCEDURE — 84100 ASSAY OF PHOSPHORUS: CPT

## 2024-03-18 PROCEDURE — 97161 PT EVAL LOW COMPLEX 20 MIN: CPT

## 2024-03-18 PROCEDURE — NBSRV NON-BILLABLE SERVICE: Performed by: INTERNAL MEDICINE

## 2024-03-18 PROCEDURE — 6360000002 HC RX W HCPCS: Performed by: INTERNAL MEDICINE

## 2024-03-18 PROCEDURE — 97530 THERAPEUTIC ACTIVITIES: CPT

## 2024-03-18 PROCEDURE — 80053 COMPREHEN METABOLIC PANEL: CPT

## 2024-03-18 PROCEDURE — 85025 COMPLETE CBC W/AUTO DIFF WBC: CPT

## 2024-03-18 PROCEDURE — 6370000000 HC RX 637 (ALT 250 FOR IP): Performed by: FAMILY MEDICINE

## 2024-03-18 PROCEDURE — 99232 SBSQ HOSP IP/OBS MODERATE 35: CPT | Performed by: INTERNAL MEDICINE

## 2024-03-18 PROCEDURE — 2580000003 HC RX 258

## 2024-03-18 PROCEDURE — 2580000003 HC RX 258: Performed by: INTERNAL MEDICINE

## 2024-03-18 RX ORDER — SODIUM CHLORIDE 0.9 % (FLUSH) 0.9 %
5-40 SYRINGE (ML) INJECTION EVERY 12 HOURS SCHEDULED
Status: DISCONTINUED | OUTPATIENT
Start: 2024-03-18 | End: 2024-03-20 | Stop reason: HOSPADM

## 2024-03-18 RX ORDER — SODIUM CHLORIDE 0.9 % (FLUSH) 0.9 %
5-40 SYRINGE (ML) INJECTION PRN
Status: DISCONTINUED | OUTPATIENT
Start: 2024-03-18 | End: 2024-03-20 | Stop reason: HOSPADM

## 2024-03-18 RX ORDER — LORAZEPAM 1 MG/1
1 TABLET ORAL
Status: DISCONTINUED | OUTPATIENT
Start: 2024-03-18 | End: 2024-03-20 | Stop reason: HOSPADM

## 2024-03-18 RX ORDER — LORAZEPAM 2 MG/ML
1 INJECTION INTRAMUSCULAR
Status: DISCONTINUED | OUTPATIENT
Start: 2024-03-18 | End: 2024-03-20 | Stop reason: HOSPADM

## 2024-03-18 RX ORDER — LORAZEPAM 2 MG/ML
2 INJECTION INTRAMUSCULAR
Status: DISCONTINUED | OUTPATIENT
Start: 2024-03-18 | End: 2024-03-20 | Stop reason: HOSPADM

## 2024-03-18 RX ORDER — LANOLIN ALCOHOL/MO/W.PET/CERES
100 CREAM (GRAM) TOPICAL DAILY
Status: DISCONTINUED | OUTPATIENT
Start: 2024-03-18 | End: 2024-03-20 | Stop reason: HOSPADM

## 2024-03-18 RX ORDER — LORAZEPAM 1 MG/1
2 TABLET ORAL
Status: DISCONTINUED | OUTPATIENT
Start: 2024-03-18 | End: 2024-03-20 | Stop reason: HOSPADM

## 2024-03-18 RX ORDER — LORAZEPAM 2 MG/ML
3 INJECTION INTRAMUSCULAR
Status: DISCONTINUED | OUTPATIENT
Start: 2024-03-18 | End: 2024-03-20 | Stop reason: HOSPADM

## 2024-03-18 RX ORDER — LORAZEPAM 1 MG/1
3 TABLET ORAL
Status: DISCONTINUED | OUTPATIENT
Start: 2024-03-18 | End: 2024-03-20 | Stop reason: HOSPADM

## 2024-03-18 RX ORDER — LORAZEPAM 1 MG/1
4 TABLET ORAL
Status: DISCONTINUED | OUTPATIENT
Start: 2024-03-18 | End: 2024-03-20 | Stop reason: HOSPADM

## 2024-03-18 RX ORDER — SODIUM CHLORIDE 9 MG/ML
INJECTION, SOLUTION INTRAVENOUS PRN
Status: DISCONTINUED | OUTPATIENT
Start: 2024-03-18 | End: 2024-03-20 | Stop reason: HOSPADM

## 2024-03-18 RX ORDER — LORAZEPAM 2 MG/ML
4 INJECTION INTRAMUSCULAR
Status: DISCONTINUED | OUTPATIENT
Start: 2024-03-18 | End: 2024-03-20 | Stop reason: HOSPADM

## 2024-03-18 RX ADMIN — LORAZEPAM 2 MG: 1 TABLET ORAL at 14:50

## 2024-03-18 RX ADMIN — SODIUM BICARBONATE 650 MG: 650 TABLET ORAL at 09:35

## 2024-03-18 RX ADMIN — HEPARIN SODIUM 5000 UNITS: 10000 INJECTION INTRAVENOUS; SUBCUTANEOUS at 08:00

## 2024-03-18 RX ADMIN — LORAZEPAM 2 MG: 1 TABLET ORAL at 15:52

## 2024-03-18 RX ADMIN — SODIUM BICARBONATE 650 MG: 650 TABLET ORAL at 13:39

## 2024-03-18 RX ADMIN — Medication: at 09:45

## 2024-03-18 RX ADMIN — METHIMAZOLE 5 MG: 5 TABLET ORAL at 13:39

## 2024-03-18 RX ADMIN — MULTIVITAMIN TABLET 1 TABLET: TABLET at 09:35

## 2024-03-18 RX ADMIN — METHIMAZOLE 5 MG: 5 TABLET ORAL at 09:45

## 2024-03-18 RX ADMIN — HEPARIN SODIUM 5000 UNITS: 10000 INJECTION INTRAVENOUS; SUBCUTANEOUS at 00:13

## 2024-03-18 RX ADMIN — Medication 400 MG: at 09:35

## 2024-03-18 RX ADMIN — Medication 1 MG: at 09:45

## 2024-03-18 RX ADMIN — CEFTRIAXONE SODIUM 1000 MG: 1 INJECTION, POWDER, FOR SOLUTION INTRAMUSCULAR; INTRAVENOUS at 11:02

## 2024-03-18 RX ADMIN — Medication 100 MG: at 15:00

## 2024-03-18 RX ADMIN — SODIUM CHLORIDE, PRESERVATIVE FREE 10 ML: 5 INJECTION INTRAVENOUS at 09:46

## 2024-03-18 RX ADMIN — ONDANSETRON 4 MG: 2 INJECTION INTRAMUSCULAR; INTRAVENOUS at 02:42

## 2024-03-18 RX ADMIN — HEPARIN SODIUM 5000 UNITS: 10000 INJECTION INTRAVENOUS; SUBCUTANEOUS at 15:53

## 2024-03-18 ASSESSMENT — PAIN SCALES - GENERAL
PAINLEVEL_OUTOF10: 0

## 2024-03-18 NOTE — PROGRESS NOTES
Hospitalist Progress Note      PCP: William Olea MD    Date of Admission: 3/13/2024        Hospital Course:   63 y.o. female presented with HYPERNATREMIA,  SHE IS A POOR HISTORIAN, AND DOES NOT KNOW WHY SHE IS HERE. SHE LIVES ALONE AND APPARENTLY WAS FOUND ON THE FLOOD.. ? ETOH ABUSE     3/17 HYPERTHYROID, STARTED ON TAPAZOL        Subjective:  PLEASANTLY CONFUSED     Patient is awake and alert and completely confused.  Patient is having obvious tremors and delirium.  Patient denies any chest pain and shortness of breath.        Medications:  Reviewed    Infusion Medications    sodium chloride      sodium chloride       Scheduled Medications    sodium chloride flush  5-40 mL IntraVENous 2 times per day    thiamine  100 mg Oral Daily    magnesium oxide  400 mg Oral Daily    methIMAzole  5 mg Oral TID    sodium bicarbonate  650 mg Oral TID    multivitamin  1 tablet Oral Daily    ammonium lactate   Topical Daily    folic acid  1 mg Oral Daily    [Held by provider] metoprolol tartrate  25 mg Oral BID    sodium chloride flush  5-40 mL IntraVENous 2 times per day    heparin (porcine)  5,000 Units SubCUTAneous Q8H     PRN Meds: sodium chloride flush, sodium chloride, LORazepam **OR** LORazepam **OR** LORazepam **OR** LORazepam **OR** LORazepam **OR** LORazepam **OR** LORazepam **OR** LORazepam, melatonin, sodium chloride flush, ondansetron **OR** ondansetron, acetaminophen **OR** acetaminophen, mineral oil-hydrophilic petrolatum, medicated lip balm, sodium chloride      Intake/Output Summary (Last 24 hours) at 3/18/2024 1518  Last data filed at 3/18/2024 0948  Gross per 24 hour   Intake 574.49 ml   Output 400 ml   Net 174.49 ml       Exam:    BP (!) 145/88   Pulse (!) 120   Temp 98.5 °F (36.9 °C)   Resp 18   Ht 1.702 m (5' 7\")   Wt 56.2 kg (123 lb 14.4 oz)   SpO2 97%   BMI 19.41 kg/m²       General appearance:  No apparent distress,   HEENT:  Normal cephalic,   Neck: Supple, with full range of motion.

## 2024-03-18 NOTE — PROGRESS NOTES
ENDOCRINOLOGY PROGRESS NOTE    Date of Admission: 3/13/2024  Date of Service: 3/18/2024  Admitting Physician: Nash Copeland DO   Primary Care Physician: William Olea MD  Consultant physician: Sandeep Hughes MD     Reason for the consultation:  Hyperthyroidism    History of Present Illness:  The history is provided by the patient. Accuracy of the patient data is excellent.    Abbie Amaya is a very pleasant 63 y.o. old female with PMH of ETOH abuse, cervical dysplasia, and hypertension and other listed below admitted to [facility] on 3/13/2024 because of a GI bleed and low BP, endocrine service was consulted for evaluation for evaluation and management of hyperthyroidism    Subjective  The patient was seen and examined at bedside this afternoon.  No acute events overnight.  Started on methimazole      Scheduled Meds:   sodium chloride flush  5-40 mL IntraVENous 2 times per day    thiamine  100 mg Oral Daily    magnesium oxide  400 mg Oral Daily    methIMAzole  5 mg Oral TID    sodium bicarbonate  650 mg Oral TID    multivitamin  1 tablet Oral Daily    ammonium lactate   Topical Daily    folic acid  1 mg Oral Daily    [Held by provider] metoprolol tartrate  25 mg Oral BID    sodium chloride flush  5-40 mL IntraVENous 2 times per day    heparin (porcine)  5,000 Units SubCUTAneous Q8H     PRN Meds:   sodium chloride flush, 5-40 mL, PRN  sodium chloride, , PRN  LORazepam, 1 mg, Q1H PRN   Or  LORazepam, 1 mg, Q1H PRN   Or  LORazepam, 2 mg, Q1H PRN   Or  LORazepam, 2 mg, Q1H PRN   Or  LORazepam, 3 mg, Q1H PRN   Or  LORazepam, 3 mg, Q1H PRN   Or  LORazepam, 4 mg, Q1H PRN   Or  LORazepam, 4 mg, Q1H PRN  melatonin, 5 mg, Nightly PRN  sodium chloride flush, 5-40 mL, PRN  ondansetron, 4 mg, Q8H PRN   Or  ondansetron, 4 mg, Q6H PRN  acetaminophen, 650 mg, Q6H PRN   Or  acetaminophen, 650 mg, Q6H PRN  mineral oil-hydrophilic petrolatum, , BID PRN  medicated lip balm, , PRN  sodium chloride, , PRN      Continuous Infusions:

## 2024-03-18 NOTE — PROGRESS NOTES
Department of Internal Medicine  Infectious Diseases  Progress  Note      C/C : Leukocytosis , Pneumonia      Pt is awake and alert  Feels better   Denies fever or chills     Afebrile       Current Facility-Administered Medications   Medication Dose Route Frequency Provider Last Rate Last Admin    magnesium oxide (MAG-OX) tablet 400 mg  400 mg Oral Daily Gita Riley APRN - CNP   400 mg at 03/18/24 0935    methIMAzole (TAPAZOLE) tablet 5 mg  5 mg Oral TID Sandeep Hughes MD   5 mg at 03/18/24 0945    sodium bicarbonate tablet 650 mg  650 mg Oral TID Faraz Sullivan MD   650 mg at 03/18/24 0935    melatonin disintegrating tablet 5 mg  5 mg Oral Nightly PRN Gita Riley APRN - CNP   5 mg at 03/17/24 2013    multivitamin 1 tablet  1 tablet Oral Daily Lee García DO   1 tablet at 03/18/24 0935    ammonium lactate (LAC-HYDRIN) 12 % lotion   Topical Daily Lee García DO   Given at 03/18/24 0945    folic acid (FOLVITE) tablet 1 mg  1 mg Oral Daily Pepper Kruger APRN - CNP   1 mg at 03/18/24 0945    [Held by provider] metoprolol tartrate (LOPRESSOR) tablet 25 mg  25 mg Oral BID Pepper Kruger APRN - CNP        sodium chloride flush 0.9 % injection 5-40 mL  5-40 mL IntraVENous 2 times per day Pepper Kruger APRN - CNP   10 mL at 03/18/24 0946    sodium chloride flush 0.9 % injection 5-40 mL  5-40 mL IntraVENous PRN Pepper Kruger APRN - CNP        ondansetron (ZOFRAN-ODT) disintegrating tablet 4 mg  4 mg Oral Q8H PRN Pepper Kruger APRN - CNP        Or    ondansetron (ZOFRAN) injection 4 mg  4 mg IntraVENous Q6H PRN Pepper Kruger APRN - CNP   4 mg at 03/18/24 0242    acetaminophen (TYLENOL) tablet 650 mg  650 mg Oral Q6H PRN Pepper Kruger APRN - CNP        Or    acetaminophen (TYLENOL) suppository 650 mg  650 mg Rectal Q6H PRN Pepper Kruger APRN - CNP        heparin (porcine) injection 5,000 Units  5,000 Units SubCUTAneous Q8H Gita Riley APRN - CNP   5,000 Units  at 03/18/24 0800    mineral oil-hydrophilic petrolatum (HYDROPHOR) ointment   Topical BID PRN Gita Riley APRN - CNP   Given at 03/17/24 1210    medicated lip balm (BLISTEX/CARMEX) stick   Topical PRN MaxwellJose Angel GitaBELEM CNP        cefTRIAXone (ROCEPHIN) 1,000 mg in sterile water 10 mL IV syringe  1,000 mg IntraVENous Q24H Thien Mc MD   1,000 mg at 03/18/24 1102    0.9 % sodium chloride infusion   IntraVENous PRN Peter Alvarado MD             REVIEW OF SYSTEMS:    CONSTITUTIONAL:  Denies fever   HEENT: Denies sore throat   RESPIRATORY: Denies SOB   CARDIOVASCULAR:  Denies palpitation or chest pain   GASTROINTESTINAL:  Denies abdomen pain, diarrhea or constipation,, nausea or vomiting. Rectal bleeding   GENITOURINARY:  Denies burning urination or frequency of urination  INTEGUMENT: denies wound , rash  HEMATOLOGIC/LYMPHATIC:  Denies lymph node swelling, gum bleeding or easy bruising.  MUSCULOSKELETAL:  Denies leg pain , joint pain , joint swelling  NEUROLOGICAL:  Denies light headed, dizziness, loss of consciousness,   Reports weakness     PHYSICAL EXAM:      Vitals:     /88   Pulse 100   Temp 98 °F (36.7 °C)   Resp 16   Ht 1.702 m (5' 7\")   Wt 56.2 kg (123 lb 14.4 oz)   SpO2 96%   BMI 19.41 kg/m²     General Appearance:    Awake, alert , no acute distress.   Head:    Normocephalic, atraumatic   Eyes:    +  pallor, no icterus,   Ears:    No obvious deformity or drainage.   Nose:   No nasal drainage   Throat:   Mucosa moist, no oral thrush   Neck:   Supple, no lymphadenopathy   Back:     no CVA tenderness   Lungs:     Clear bilaterally    Heart:    Regular rate and rhythm, no murmur   Abdomen:     Soft, non-tender, bowel sounds present    Extremities:   + edema    Pulses:   Dorsalis pedis palpable    Skin:   no rashes      CBC with Differential:      Lab Results   Component Value Date/Time    WBC 5.8 03/18/2024 05:04 AM    RBC 3.75 03/18/2024 05:04 AM    HGB 12.6 03/18/2024 05:04

## 2024-03-18 NOTE — PROGRESS NOTES
Physical Therapy  Initial Assessment     Name: Abbie Amaya  : 1960  MRN: [id number]      Date of Service: 3/18/2024    Evaluating PT: Kris Carranza, PT, DPT [id number]      Room #:  4507/4507-B  Diagnosis:  Alcohol withdrawal delirium (HCC) [F10.931]  Hyponatremia [E87.1]  Essential hypertension [I10]  External hemorrhoid [K64.4]  JARRELL (acute kidney injury) (HCC) [N17.9]  PMHx/PSHx:   has a past medical history of Anxiety, Cervical dysplasia, Depression, ETOH abuse, GERD (gastroesophageal reflux disease), and Hypertension.  Precautions:  Fall risk, Seizures, Incontinent, Cognition, Alarms, TSM    SUBJECTIVE:    Pt is a questionable historian. Pt lives alone in a 1 story home with 3 stair(s) and a questionable number rail(s) to enter. B&B are on first floor. Laundry is in basement with 1 flight of stairs and no rails. Pt ambulated with no AD prior to admission. Pt [facility].    OBJECTIVE:   Initial Evaluation  Date: 3/18/24 Treatment Date: Short Term/ Long Term   Goals   AM-PAC 6 Clicks      Was pt agreeable to Eval/treatment? Yes     Does pt have pain? No complaints of pain     Bed Mobility  Rolling: NT  Supine to sit: Heena  Sit to supine: Heena  Scooting: Heena required strong VC  Rolling: Supervision  Supine to sit: Supervision  Sit to supine: Supervision  Scooting: Supervision   Transfers Sit to stand: CGA  Stand to sit: CGA  Stand pivot: Heena with WW  Sit to stand: Supervision  Stand to sit: Supervision  Stand pivot: Supervision with WW   Ambulation   30 feet with WW with Heena for WW management  >400 feet with WW with Supervision   Stair negotiation: ascended and descended NT  >4 step(s) with 1 rail(s) with Supervision   ROM BUE: Refer to OT note  BLE: WFL     Strength BUE: Refer to OT note  BLE: WFL     Balance Sitting EOB: CGA  Dynamic Standing: Heena with WW  Sitting EOB: Supervision  Dynamic Standing: Supervision with WW     Pt is A & O x: x1-2, pt able to [facility] was in  coordination, safety awareness, endurance, and tolerance to activity.  Skilled positioning was performed to protect skin/joint integrity.  Pt educated on safety with WW and for safe ambulation/ transfers with WW.    Pt's/family goals:  1. To return to PLOF    Prognosis is Good for reaching above PT goals.    Patient and or family understand(s) diagnosis, prognosis, and plan of care.  Yes    PHYSICAL THERAPY PLAN OF CARE:    PT POC is established based on physician order and patient diagnosis     Referring provider/PT Order:    Start   Ordering Provider    03/14/24 0100  PT evaluation and treat  Start:  03/14/24 0100,   End:  03/14/24 0100,   ONE TIME,   Standing Count:  1 Occurrences,   R         Pepper Kruger APRN -      Diagnosis:  Alcohol withdrawal delirium (HCC) [F10.931]  Hyponatremia [E87.1]  Essential hypertension [I10]  External hemorrhoid [K64.4]  JARRELL (acute kidney injury) (HCC) [N17.9]  Specific instructions for next treatment:  Increase ambulation and independence with function.    Current Treatment Recommendations:     [x] Strengthening to improve independence with functional mobility   [] ROM to improve independence with functional mobility   [x] Balance Training to improve static/dynamic balance and to reduce fall risk  [x] Endurance Training to improve activity tolerance during functional mobility   [x] Transfer Training to improve safety and independence with all functional transfers   [] Gait Training to improve gait mechanics, endurance and assess need for appropriate assistive device  [] Stair Training in preparation for safe discharge home and/or into the community   [] Positioning to prevent skin breakdown and contractures  [x] Safety and Education Training   [] Patient/Caregiver Education   [] HEP  [] Other     PT long term treatment goals are located in above grid    Frequency of treatments: 2-5x/week x 1-2 weeks.    Time in  0855  Time out  0920    Total Treatment Time  15 minutes

## 2024-03-18 NOTE — CARE COORDINATION
CM Update: Met with patient at bedside to discuss transition of care plan. Patient confused. Discharge plan is [facility]. Destination and KINDRA updated. Face Sheet, Ambulette Form, and Envelope in soft chart. Patient is a long term bed hold and can return when medically stable. Patient came to hospital with rectal bleeding. Found to have Hyponatremia, Na 118 in ED, JARRELL and Cr. Was 2.6 and an external hemorrhoid. Na 127 today. CM/SW to follow. MT

## 2024-03-18 NOTE — PROGRESS NOTES
4 Eyes Skin Assessment     NAME:  Abbie Amaya  YOB: 1960  MEDICAL RECORD NUMBER:  [id number]    The patient is being assessed for  Transfer to New Unit    I agree that at least one RN has performed a thorough Head to Toe Skin Assessment on the patient. ALL assessment sites listed below have been assessed.      Areas assessed by both nurses:    Head, Face, Ears, Shoulders, Back, Chest, Arms, Elbows, Hands, Sacrum. Buttock, Coccyx, Ischium, and Legs. Feet and Heels        Does the Patient have a Wound? No noted wound(s)       Joel Prevention initiated by RN: Yes  Wound Care Orders initiated by RN: No    Pressure Injury (Stage 3,4, Unstageable, DTI, NWPT, and Complex wounds) if present, place Wound referral order by RN under : No    New Ostomies, if present place, Ostomy referral order under : No     Nurse 1 eSignature: Electronically signed by Mckenna Weaver RN on 3/17/24 at 10:23 PM EDT    **SHARE this note so that the co-signing nurse can place an eSignature**    Nurse 2 eSignature: Electronically signed by Mariaelena Pierce RN on 3/18/24 at 5:07 AM EDT

## 2024-03-18 NOTE — PLAN OF CARE
Problem: Safety - Adult  Goal: Free from fall injury  Outcome: Progressing     Problem: Discharge Planning  Goal: Discharge to home or other facility with appropriate resources  Outcome: Progressing  Flowsheets (Taken 3/17/2024 2144)  Discharge to home or other facility with appropriate resources:   Identify barriers to discharge with patient and caregiver   Identify discharge learning needs (meds, wound care, etc)     Problem: Pain  Goal: Verbalizes/displays adequate comfort level or baseline comfort level  Outcome: Progressing     Problem: Skin/Tissue Integrity  Goal: Absence of new skin breakdown  Description: 1.  Monitor for areas of redness and/or skin breakdown  2.  Assess vascular access sites hourly  3.  Every 4-6 hours minimum:  Change oxygen saturation probe site  4.  Every 4-6 hours:  If on nasal continuous positive airway pressure, respiratory therapy assess nares and determine need for appliance change or resting period.  Outcome: Progressing     Problem: Cardiovascular - Adult  Goal: Absence of cardiac dysrhythmias or at baseline  Outcome: Progressing  Flowsheets (Taken 3/17/2024 2144)  Absence of cardiac dysrhythmias or at baseline: Monitor cardiac rate and rhythm     Problem: Infection - Adult  Goal: Absence of infection at discharge  Outcome: Progressing  Flowsheets (Taken 3/17/2024 2144)  Absence of infection at discharge: Assess and monitor for signs and symptoms of infection  Goal: Absence of infection during hospitalization  Outcome: Progressing  Flowsheets (Taken 3/17/2024 2144)  Absence of infection during hospitalization:   Assess and monitor for signs and symptoms of infection   Monitor lab/diagnostic results     Problem: Metabolic/Fluid and Electrolytes - Adult  Goal: Electrolytes maintained within normal limits  Outcome: Progressing  Flowsheets (Taken 3/17/2024 2144)  Electrolytes maintained within normal limits: Monitor labs and assess patient for signs and symptoms of electrolyte  imbalances     Problem: Hematologic - Adult  Goal: Maintains hematologic stability  Outcome: Progressing  Flowsheets (Taken 3/17/2024 2144)  Maintains hematologic stability: Assess for signs and symptoms of bleeding or hemorrhage     Problem: Neurosensory - Adult  Goal: Achieves stable or improved neurological status  Outcome: Progressing  Flowsheets (Taken 3/17/2024 2144)  Achieves stable or improved neurological status: Assess for and report changes in neurological status

## 2024-03-19 LAB
ALBUMIN SERPL-MCNC: 4 G/DL (ref 3.5–5.2)
ALP SERPL-CCNC: 99 U/L (ref 35–104)
ALT SERPL-CCNC: 12 U/L (ref 0–32)
ANION GAP SERPL CALCULATED.3IONS-SCNC: 13 MMOL/L (ref 7–16)
AST SERPL-CCNC: 18 U/L (ref 0–31)
BILIRUB SERPL-MCNC: 1.1 MG/DL (ref 0–1.2)
BUN SERPL-MCNC: 11 MG/DL (ref 6–23)
CALCIUM SERPL-MCNC: 9.7 MG/DL (ref 8.6–10.2)
CHLORIDE SERPL-SCNC: 96 MMOL/L (ref 98–107)
CO2 SERPL-SCNC: 22 MMOL/L (ref 22–29)
CREAT SERPL-MCNC: 0.6 MG/DL (ref 0.5–1)
ERYTHROCYTE [DISTWIDTH] IN BLOOD BY AUTOMATED COUNT: 12.2 % (ref 11.5–15)
GFR SERPL CREATININE-BSD FRML MDRD: >60 ML/MIN/1.73M2
GLUCOSE SERPL-MCNC: 90 MG/DL (ref 74–99)
HCT VFR BLD AUTO: 38.8 % (ref 34–48)
HGB BLD-MCNC: 13.4 G/DL (ref 11.5–15.5)
MAGNESIUM SERPL-MCNC: 1.6 MG/DL (ref 1.6–2.6)
MCH RBC QN AUTO: 34.1 PG (ref 26–35)
MCHC RBC AUTO-ENTMCNC: 34.5 G/DL (ref 32–34.5)
MCV RBC AUTO: 98.7 FL (ref 80–99.9)
MICROORGANISM SPEC CULT: NORMAL
MICROORGANISM SPEC CULT: NORMAL
PHOSPHATE SERPL-MCNC: 3.4 MG/DL (ref 2.5–4.5)
PLATELET # BLD AUTO: 221 K/UL (ref 130–450)
PMV BLD AUTO: 9.1 FL (ref 7–12)
POTASSIUM SERPL-SCNC: 5 MMOL/L (ref 3.5–5)
PROT SERPL-MCNC: 6.6 G/DL (ref 6.4–8.3)
RBC # BLD AUTO: 3.93 M/UL (ref 3.5–5.5)
SERVICE CMNT-IMP: NORMAL
SERVICE CMNT-IMP: NORMAL
SODIUM SERPL-SCNC: 131 MMOL/L (ref 132–146)
SPECIMEN DESCRIPTION: NORMAL
SPECIMEN DESCRIPTION: NORMAL
T4 FREE SERPL-MCNC: 1.8 NG/DL (ref 0.9–1.7)
WBC OTHER # BLD: 5.6 K/UL (ref 4.5–11.5)

## 2024-03-19 PROCEDURE — 84100 ASSAY OF PHOSPHORUS: CPT

## 2024-03-19 PROCEDURE — 99231 SBSQ HOSP IP/OBS SF/LOW 25: CPT | Performed by: INTERNAL MEDICINE

## 2024-03-19 PROCEDURE — 6370000000 HC RX 637 (ALT 250 FOR IP): Performed by: INTERNAL MEDICINE

## 2024-03-19 PROCEDURE — 6370000000 HC RX 637 (ALT 250 FOR IP)

## 2024-03-19 PROCEDURE — 36415 COLL VENOUS BLD VENIPUNCTURE: CPT

## 2024-03-19 PROCEDURE — 6370000000 HC RX 637 (ALT 250 FOR IP): Performed by: FAMILY MEDICINE

## 2024-03-19 PROCEDURE — 2060000000 HC ICU INTERMEDIATE R&B

## 2024-03-19 PROCEDURE — 83735 ASSAY OF MAGNESIUM: CPT

## 2024-03-19 PROCEDURE — 97530 THERAPEUTIC ACTIVITIES: CPT

## 2024-03-19 PROCEDURE — 80053 COMPREHEN METABOLIC PANEL: CPT

## 2024-03-19 PROCEDURE — 97535 SELF CARE MNGMENT TRAINING: CPT

## 2024-03-19 PROCEDURE — 85027 COMPLETE CBC AUTOMATED: CPT

## 2024-03-19 PROCEDURE — 6360000002 HC RX W HCPCS

## 2024-03-19 PROCEDURE — 84439 ASSAY OF FREE THYROXINE: CPT

## 2024-03-19 RX ORDER — METHIMAZOLE 5 MG/1
10 TABLET ORAL 2 TIMES DAILY
Status: DISCONTINUED | OUTPATIENT
Start: 2024-03-19 | End: 2024-03-20 | Stop reason: HOSPADM

## 2024-03-19 RX ADMIN — Medication 1 MG: at 09:40

## 2024-03-19 RX ADMIN — SODIUM BICARBONATE 650 MG: 650 TABLET ORAL at 09:40

## 2024-03-19 RX ADMIN — Medication: at 09:00

## 2024-03-19 RX ADMIN — LORAZEPAM 2 MG: 1 TABLET ORAL at 05:28

## 2024-03-19 RX ADMIN — HEPARIN SODIUM 5000 UNITS: 10000 INJECTION INTRAVENOUS; SUBCUTANEOUS at 09:00

## 2024-03-19 RX ADMIN — METHIMAZOLE 10 MG: 5 TABLET ORAL at 20:58

## 2024-03-19 RX ADMIN — METHIMAZOLE 5 MG: 5 TABLET ORAL at 09:00

## 2024-03-19 RX ADMIN — Medication 100 MG: at 09:40

## 2024-03-19 RX ADMIN — Medication: at 20:58

## 2024-03-19 RX ADMIN — LORAZEPAM 2 MG: 1 TABLET ORAL at 09:40

## 2024-03-19 RX ADMIN — LORAZEPAM 2 MG: 1 TABLET ORAL at 15:25

## 2024-03-19 RX ADMIN — METHIMAZOLE 5 MG: 5 TABLET ORAL at 14:32

## 2024-03-19 RX ADMIN — HEPARIN SODIUM 5000 UNITS: 10000 INJECTION INTRAVENOUS; SUBCUTANEOUS at 14:27

## 2024-03-19 RX ADMIN — LORAZEPAM 2 MG: 1 TABLET ORAL at 16:37

## 2024-03-19 RX ADMIN — SODIUM BICARBONATE 650 MG: 650 TABLET ORAL at 14:27

## 2024-03-19 RX ADMIN — Medication 400 MG: at 09:40

## 2024-03-19 RX ADMIN — Medication: at 12:00

## 2024-03-19 RX ADMIN — SODIUM BICARBONATE 650 MG: 650 TABLET ORAL at 20:58

## 2024-03-19 RX ADMIN — MULTIVITAMIN TABLET 1 TABLET: TABLET at 09:40

## 2024-03-19 ASSESSMENT — PAIN SCALES - GENERAL
PAINLEVEL_OUTOF10: 0

## 2024-03-19 NOTE — FLOWSHEET NOTE
Inpatient Wound Care (Initial consult) 4507B    Admit Date: 3/13/2024  3:14 PM    Reason for consult:  Buttocks    Significant history: Per H&P    Chief Complaint:   HYPERNATREMIA        History Of Present Illness:      63 y.o. female presented with HYPERNATREMIA,  SHE IS A POOR HISTORIAN, AND DOES NOT KNOW WHY SHE IS HERE. SHE LIVES ALONE AND APPARENTLY WAS FOUND ON THE FLOOD.. ? ETOH ABUSE    Findings:     03/19/24 1110   Skin Integumentary    Skin Integrity   (redness blanchable soft)   Location bilateral heels   Skin Integrity Site 2   Skin Integrity Location 2 Rash;Redness   Location 2 left achilles and left lateral ankle   Skin Integrity Site 3   Skin Integrity Location 3   (IAD)    Location 3 bilateral buttocks   Skin Integrity Site 4   Skin Integrity Location 4   (scattered dry scabbed areas)   Location 4 left elbow   Wound 03/19/24 Left plantar 2nd toe   Date First Assessed/Time First Assessed: 03/19/24 1127   Present on Original Admission: Yes  Wound Description (Comments): Left plantar 2nd toe   Wound Image    Wound Etiology Arterial   Dressing/Treatment Open to air   Wound Length (cm) 0.5 cm   Wound Width (cm) 0.8 cm   Wound Depth (cm)   (Unable to determine)   Wound Surface Area (cm^2) 0.4 cm^2   Wound Assessment Dry   Drainage Amount None (dry)   Odor None   Shana-wound Assessment Dry/flaky       **Informed Consent**    The patient has given verbal consent to have photos taken of wound and inserted into their chart as part of their permanent medical record for purposes of documentation, treatment management and/or medical review.   All Images taken on 3/19/24 of patient name: Abbie Amaya were transmitted and stored on secured Epic  Site located within Media Folder Tab by a registered [employer] Mobile Application Device.        Impression:  Left 2nd toe; Arterial    Plan: Aloe vesta ointment  TAPS  Heel protectors  Patient will need continued preventative care.      lEisabeth Parker RN 3/19/2024

## 2024-03-19 NOTE — PROGRESS NOTES
Hospitalist Progress Note      PCP: William Olea MD    Date of Admission: 3/13/2024        Hospital Course:   63 y.o. female presented with HYPERNATREMIA,  SHE IS A POOR HISTORIAN, AND DOES NOT KNOW WHY SHE IS HERE. SHE LIVES ALONE AND APPARENTLY WAS FOUND ON THE FLOOD.. ? ETOH ABUSE     3/17 HYPERTHYROID, STARTED ON TAPAZOL        Subjective:  PLEASANTLY CONFUSED     Patient is lying in bed this a.m.  Patient is easily arousable.  Patient has no acute complaints.  Patient denies any chest pain, shortness of breath.  No acute events overnight.      Medications:  Reviewed    Infusion Medications    sodium chloride      sodium chloride       Scheduled Medications    sodium chloride flush  5-40 mL IntraVENous 2 times per day    thiamine  100 mg Oral Daily    magnesium oxide  400 mg Oral Daily    methIMAzole  5 mg Oral TID    sodium bicarbonate  650 mg Oral TID    multivitamin  1 tablet Oral Daily    ammonium lactate   Topical Daily    folic acid  1 mg Oral Daily    [Held by provider] metoprolol tartrate  25 mg Oral BID    sodium chloride flush  5-40 mL IntraVENous 2 times per day    heparin (porcine)  5,000 Units SubCUTAneous Q8H     PRN Meds: sodium chloride flush, sodium chloride, LORazepam **OR** LORazepam **OR** LORazepam **OR** LORazepam **OR** LORazepam **OR** LORazepam **OR** LORazepam **OR** LORazepam, melatonin, sodium chloride flush, ondansetron **OR** ondansetron, acetaminophen **OR** acetaminophen, mineral oil-hydrophilic petrolatum, medicated lip balm, sodium chloride      Intake/Output Summary (Last 24 hours) at 3/19/2024 0831  Last data filed at 3/18/2024 0948  Gross per 24 hour   Intake --   Output 400 ml   Net -400 ml       Exam:    BP (!) 131/92   Pulse 99   Temp 98 °F (36.7 °C) (Temporal)   Resp 14   Ht 1.702 m (5' 7\")   Wt 56.2 kg (123 lb 14.4 oz)   SpO2 98%   BMI 19.41 kg/m²       General appearance:  No apparent distress,   HEENT:  Normal cephalic,   Neck: Supple, with full range  of motion.  Trachea midline.  Respiratory:  Normal respiratory effort. Clear to auscultation,   Cardiovascular:  RRR  Abdomen: Soft, non-tender, non-distended with normal bowel sounds.  Musculoskeletal:  No clubbing, cyanosis or edema bilaterally.    Skin: smooth and dry   Neurologic:   Cranial nerves: II-XII intact,   Psychiatric:  Alert and oriented x 1     Labs:   Recent Labs     03/17/24 0436 03/18/24  0504   WBC 4.1* 5.8   HGB 12.0 12.6   HCT 33.9* 36.4    218     Recent Labs     03/17/24  0436 03/18/24  0504 03/19/24  0505    127*  --    K 3.8 4.1  --    CL 95* 92*  --    CO2 23 23  --    BUN 15 10  --    CREATININE 0.6 0.6  --    CALCIUM 9.3 9.5  --    PHOS 3.1 2.9 3.4     Recent Labs     03/17/24  0436 03/18/24  0504   AST 17 19   ALT 14 14   BILITOT 1.3* 1.1   ALKPHOS 90 99     No results for input(s): \"INR\" in the last 72 hours.    No results for input(s): \"CKTOTAL\", \"TROPONINI\" in the last 72 hours.  Recent Labs     03/17/24  0436 03/18/24  0504   AST 17 19   ALT 14 14   BILITOT 1.3* 1.1   ALKPHOS 90 99     No results for input(s): \"LACTA\" in the last 72 hours.  Lab Results   Component Value Date    URICACID 8.1 (H) 03/14/2024     Lab Results   Component Value Date    AMMONIA 15 03/14/2024       Assessment:    Active Hospital Problems    Diagnosis Date Noted    Hyperthyroidism [E05.90] 03/18/2024    Hyponatremia [E87.1] 03/14/2024    JARRELL (acute kidney injury) (HCC) [N17.9] 03/13/2024   ETOH ABUSE  CAP  HYPERTHYROID      PLAN:  NEPHROLOGY   ID CONSULT  REPLACE LYTES   ROCEPHIN   TAPAZOL    3/18/2024  Patient having obvious tremors-withdrawals from alcohol  Initiate CIWA scale  Thiamine/folic acid  Monitor off antibiotics  Leukocytosis improved  Patient is going to need placement as she attempted to go home during her last stay.  Patient lives alone    3/19/2024  Continue CIWA scale-patient continues to utilize approximately every 10 hours.  Vital signs are stable  Patient will likely need

## 2024-03-19 NOTE — PLAN OF CARE
Problem: Safety - Adult  Goal: Free from fall injury  Outcome: Progressing     Problem: Discharge Planning  Goal: Discharge to home or other facility with appropriate resources  Outcome: Progressing     Problem: Pain  Goal: Verbalizes/displays adequate comfort level or baseline comfort level  Outcome: Progressing     Problem: Skin/Tissue Integrity  Goal: Absence of new skin breakdown  Description: 1.  Monitor for areas of redness and/or skin breakdown  2.  Assess vascular access sites hourly  3.  Every 4-6 hours minimum:  Change oxygen saturation probe site  4.  Every 4-6 hours:  If on nasal continuous positive airway pressure, respiratory therapy assess nares and determine need for appliance change or resting period.  Outcome: Progressing     Problem: Cardiovascular - Adult  Goal: Absence of cardiac dysrhythmias or at baseline  Outcome: Progressing  Flowsheets (Taken 3/18/2024 2000)  Absence of cardiac dysrhythmias or at baseline: Monitor cardiac rate and rhythm     Problem: Infection - Adult  Goal: Absence of infection at discharge  Outcome: Progressing  Flowsheets (Taken 3/18/2024 2000)  Absence of infection at discharge:   Assess and monitor for signs and symptoms of infection   Monitor lab/diagnostic results   Monitor all insertion sites i.e., indwelling lines, tubes and drains  Goal: Absence of infection during hospitalization  Outcome: Progressing  Flowsheets (Taken 3/18/2024 2000)  Absence of infection during hospitalization:   Assess and monitor for signs and symptoms of infection   Monitor lab/diagnostic results   Monitor all insertion sites i.e., indwelling lines, tubes and drains     Problem: Metabolic/Fluid and Electrolytes - Adult  Goal: Electrolytes maintained within normal limits  Outcome: Progressing  Flowsheets (Taken 3/18/2024 2000)  Electrolytes maintained within normal limits: Monitor labs and assess patient for signs and symptoms of electrolyte imbalances     Problem: Hematologic -

## 2024-03-19 NOTE — PROGRESS NOTES
independence/tolerance for self-care routine  * Functional transfer/mobility training/DME recommendations for increased independence, safety, and fall prevention  * Patient/Family education to increase follow through with safety techniques and functional independence  * Recommendation of environmental modifications for increased safety with functional transfers/mobility and ADLs  * Cognitive retraining/development of therapeutic activities to improve problem solving, judgement, memory, and attention for increased safety/participation in ADL/IADL tasks  * Sensory re-education to improve body/limb awareness, maintain/improve skin integrity, and improve hand/UE motor function  * Visual-perceptual training to improve environmental scanning, visual attention/focus, and oculomotor skills for increased safety/independence with functional transfers/mobility and ADLs  * Splinting/positioning for increased function, prevention of contractures, and improve skin integrity  * Therapeutic exercise to improve motor endurance, ROM, and functional strength for ADLs/functional transfers  * Therapeutic activities to facilitate/challenge dynamic balance, stand tolerance for increased safety and independence with ADLs  * Therapeutic activities to facilitate gross/fine motor skills for increased independence with ADLs  * Neuro-muscular re-education: facilitation of righting/equilibrium reactions, midline orientation, scapular stability/mobility, normalization of muscle tone, and facilitation of volitional active controled movement  * Positioning to improve skin integrity, interaction with environment and functional independence  * Delirium prevention/treatment  * Manual techniques for edema management    Recommended Adaptive Equipment: TBD     Home Living: Pt admitted from [facility], where she was active with therapy using FWW. Pt previously lived alone in a 1 story house with basement laundry with 3-4 step(s) to enter and 1 rail(s);  1 flight down to basement with no hand rail; bed/bath on main level of home. (Pt is a questionable historian- inconsistent home set-up provided).     Bathroom setup: walk-in shower  Equipment owned: shower chair, grab bar x1 in shower    Prior Level of Function: Ind with ADLs;  Ind with IADLs. No AD for functional mobility.   Driving: No- neighbor assists  Occupation: watch tv and hang out 8 y/o mini poodle \"kapil\", 3/15 stating she only has a cat and does not have a dog.  Previously .    Pain Level: pt c/o 0/10 pain this session    Cognition: A&O: 2/4- not oriented to month/year; Follows 1 step commands    Memory: P+   Comprehension: P+   Problem solving: P+   Judgement/safety: P+               Communication skills: WFL           Vision: Grossly WFL               Glasses: yes                                                   Hearing: WFL     RASS: -1  CAM-ICU: (+) Delirium    UE Assessment:   Hand Dominance: Right [x]  Left []     ROM Strength STM goal: PRN   RUE  WFL 4/5  : WFL   Increase overall strength for participation in ADLs.     LUE WFL 4/5  : WFL   Increase overall strength for participation in ADLs.       Sensation: No c/o numbness/tingling in extremities.  Tone: WNL  Edema: Unremarkable     Functional Assessment:  AM-PAC Daily Activity Raw Score: 13/24   Initial Eval Status  Date: 3/14/24 Treatment Status  Date: 3/19/24 STGs = LTGs  Time frame: 7-14 days   Feeding Min A  To hold cup Min A  Per last tx                     Set-up       Grooming Max A  To brush hair at EOB.   Mod  A  Overall to wash face and apply deodorant seated. Cuing to take lid off deodorant.                     Min A        UB dressing/bathing Max A   Mod A  To don/doff gown seated EOB                     Min A       LB dressing/bathing Dep  To don sock- limited by balance/cognition.   Max A                     Min A        Toileting Dep  Incontinent of bowel   Max A- hygiene                     Min A

## 2024-03-19 NOTE — CARE COORDINATION
CM Update: Met with patient at bedside to discuss transition of care plan. Patient confused. Discharge plan is [facility]. Destination and [facility] updated. Face Sheet, Ambulette Form, and Envelope in soft chart. Patient is a long term bed hold and can return when medically stable. Patient came to hospital with rectal bleeding. Found to have Hyponatremia, Na 118 in ED, JARRELL and Cr. Was 2.6 and an external hemorrhoid. Na 131 today. CM/SW to follow. MT

## 2024-03-19 NOTE — PROGRESS NOTES
Department of Internal Medicine  Infectious Diseases  Progress  Note      C/C : Leukocytosis , Pneumonia      Pt is awake and alert  Feels better   Denies fever or chills     Afebrile       Current Facility-Administered Medications   Medication Dose Route Frequency Provider Last Rate Last Admin    miconazole nitrate 2 % ointment   Topical BID Tsering Sheldon MD        And    miconazole nitrate 2 % ointment   Topical TID PRN Tsering Sheldon MD        sodium chloride flush 0.9 % injection 5-40 mL  5-40 mL IntraVENous 2 times per day Ana Cristina Herron APRN - CNP        sodium chloride flush 0.9 % injection 5-40 mL  5-40 mL IntraVENous PRN Ana Cristina Herron APRN - CNP        0.9 % sodium chloride infusion   IntraVENous PRN Ana Cristina Herron APRN - CNP        thiamine tablet 100 mg  100 mg Oral Daily Ana Cristina Herron APRN - CNP   100 mg at 03/19/24 0940    LORazepam (ATIVAN) tablet 1 mg  1 mg Oral Q1H PRN Ana Cristina Herron APRN - CNP        Or    LORazepam (ATIVAN) injection 1 mg  1 mg IntraVENous Q1H PRN Ana Cristina Herron APRN - CNP        Or    LORazepam (ATIVAN) tablet 2 mg  2 mg Oral Q1H PRN Ana Cristina Herron APRN - CNP   2 mg at 03/19/24 0940    Or    LORazepam (ATIVAN) injection 2 mg  2 mg IntraVENous Q1H PRN Ana Cristina Herron APRN - CNP        Or    LORazepam (ATIVAN) tablet 3 mg  3 mg Oral Q1H PRN Ana Cristina Herron APRN - CNP        Or    LORazepam (ATIVAN) injection 3 mg  3 mg IntraVENous Q1H PRN Ana Cristina Herron APRN - CNP        Or    LORazepam (ATIVAN) tablet 4 mg  4 mg Oral Q1H PRN Ana Cristina Herron APRN - CNP        Or    LORazepam (ATIVAN) injection 4 mg  4 mg IntraVENous Q1H PRN Ana Cristina Herron APRN - CNP        magnesium oxide (MAG-OX) tablet 400 mg  400 mg Oral Daily Gita Riley APRN - CNP   400 mg at 03/19/24 0940    methIMAzole (TAPAZOLE) tablet 5 mg  5 mg Oral TID Sandeep Hughes MD   5 mg at 03/19/24 0900    sodium bicarbonate tablet 650 mg  650 mg Oral TID Sullivan,

## 2024-03-20 ENCOUNTER — TELEPHONE (OUTPATIENT)
Dept: PRIMARY CARE CLINIC | Age: 64
End: 2024-03-20

## 2024-03-20 VITALS
SYSTOLIC BLOOD PRESSURE: 130 MMHG | TEMPERATURE: 97.4 F | RESPIRATION RATE: 18 BRPM | OXYGEN SATURATION: 94 % | HEART RATE: 113 BPM | DIASTOLIC BLOOD PRESSURE: 97 MMHG | WEIGHT: 123.9 LBS | HEIGHT: 67 IN | BODY MASS INDEX: 19.45 KG/M2

## 2024-03-20 LAB
ALBUMIN SERPL-MCNC: 4.1 G/DL (ref 3.5–5.2)
ALP SERPL-CCNC: 102 U/L (ref 35–104)
ALT SERPL-CCNC: 12 U/L (ref 0–32)
ANION GAP SERPL CALCULATED.3IONS-SCNC: 15 MMOL/L (ref 7–16)
AST SERPL-CCNC: 20 U/L (ref 0–31)
BILIRUB SERPL-MCNC: 1.3 MG/DL (ref 0–1.2)
BUN SERPL-MCNC: 9 MG/DL (ref 6–23)
CALCIUM SERPL-MCNC: 9.8 MG/DL (ref 8.6–10.2)
CHLORIDE SERPL-SCNC: 95 MMOL/L (ref 98–107)
CO2 SERPL-SCNC: 21 MMOL/L (ref 22–29)
CREAT SERPL-MCNC: 0.5 MG/DL (ref 0.5–1)
ERYTHROCYTE [DISTWIDTH] IN BLOOD BY AUTOMATED COUNT: 12.1 % (ref 11.5–15)
GFR SERPL CREATININE-BSD FRML MDRD: >60 ML/MIN/1.73M2
GLUCOSE SERPL-MCNC: 99 MG/DL (ref 74–99)
HCT VFR BLD AUTO: 35.5 % (ref 34–48)
HGB BLD-MCNC: 12.8 G/DL (ref 11.5–15.5)
MAGNESIUM SERPL-MCNC: 1.3 MG/DL (ref 1.6–2.6)
MCH RBC QN AUTO: 34.8 PG (ref 26–35)
MCHC RBC AUTO-ENTMCNC: 36.1 G/DL (ref 32–34.5)
MCV RBC AUTO: 96.5 FL (ref 80–99.9)
PHOSPHATE SERPL-MCNC: 3.2 MG/DL (ref 2.5–4.5)
PLATELET # BLD AUTO: 258 K/UL (ref 130–450)
PMV BLD AUTO: 9.9 FL (ref 7–12)
POTASSIUM SERPL-SCNC: 4.4 MMOL/L (ref 3.5–5)
PROT SERPL-MCNC: 6.6 G/DL (ref 6.4–8.3)
RBC # BLD AUTO: 3.68 M/UL (ref 3.5–5.5)
SODIUM SERPL-SCNC: 131 MMOL/L (ref 132–146)
WBC OTHER # BLD: 5.4 K/UL (ref 4.5–11.5)

## 2024-03-20 PROCEDURE — 6360000002 HC RX W HCPCS: Performed by: INTERNAL MEDICINE

## 2024-03-20 PROCEDURE — 84100 ASSAY OF PHOSPHORUS: CPT

## 2024-03-20 PROCEDURE — 6370000000 HC RX 637 (ALT 250 FOR IP)

## 2024-03-20 PROCEDURE — 6360000002 HC RX W HCPCS: Performed by: FAMILY MEDICINE

## 2024-03-20 PROCEDURE — 83735 ASSAY OF MAGNESIUM: CPT

## 2024-03-20 PROCEDURE — 6360000002 HC RX W HCPCS

## 2024-03-20 PROCEDURE — 85027 COMPLETE CBC AUTOMATED: CPT

## 2024-03-20 PROCEDURE — 6370000000 HC RX 637 (ALT 250 FOR IP): Performed by: INTERNAL MEDICINE

## 2024-03-20 PROCEDURE — 80053 COMPREHEN METABOLIC PANEL: CPT

## 2024-03-20 PROCEDURE — 99232 SBSQ HOSP IP/OBS MODERATE 35: CPT | Performed by: INTERNAL MEDICINE

## 2024-03-20 PROCEDURE — 6370000000 HC RX 637 (ALT 250 FOR IP): Performed by: FAMILY MEDICINE

## 2024-03-20 PROCEDURE — 36415 COLL VENOUS BLD VENIPUNCTURE: CPT

## 2024-03-20 RX ORDER — METHIMAZOLE 10 MG/1
10 TABLET ORAL 2 TIMES DAILY
Refills: 4 | DISCHARGE
Start: 2024-03-20 | End: 2024-04-19

## 2024-03-20 RX ORDER — METOPROLOL SUCCINATE 25 MG/1
12.5 TABLET, EXTENDED RELEASE ORAL DAILY
Qty: 30 TABLET | Refills: 3 | DISCHARGE
Start: 2024-03-20

## 2024-03-20 RX ORDER — SODIUM BICARBONATE 650 MG/1
650 TABLET ORAL 3 TIMES DAILY
DISCHARGE
Start: 2024-03-20

## 2024-03-20 RX ORDER — METOPROLOL SUCCINATE 25 MG/1
12.5 TABLET, EXTENDED RELEASE ORAL DAILY
Status: DISCONTINUED | OUTPATIENT
Start: 2024-03-20 | End: 2024-03-20 | Stop reason: HOSPADM

## 2024-03-20 RX ORDER — AMMONIUM LACTATE 12 G/100G
LOTION TOPICAL
DISCHARGE
Start: 2024-03-21

## 2024-03-20 RX ORDER — MULTIVITAMIN WITH IRON
1 TABLET ORAL DAILY
Refills: 0 | DISCHARGE
Start: 2024-03-21

## 2024-03-20 RX ORDER — MAGNESIUM SULFATE IN WATER 40 MG/ML
4000 INJECTION, SOLUTION INTRAVENOUS ONCE
Status: COMPLETED | OUTPATIENT
Start: 2024-03-20 | End: 2024-03-20

## 2024-03-20 RX ADMIN — LORAZEPAM 2 MG: 2 INJECTION INTRAMUSCULAR; INTRAVENOUS at 14:18

## 2024-03-20 RX ADMIN — HEPARIN SODIUM 5000 UNITS: 10000 INJECTION INTRAVENOUS; SUBCUTANEOUS at 09:22

## 2024-03-20 RX ADMIN — MAGNESIUM SULFATE HEPTAHYDRATE 4000 MG: 4 INJECTION, SOLUTION INTRAVENOUS at 14:18

## 2024-03-20 RX ADMIN — Medication: at 09:20

## 2024-03-20 RX ADMIN — HEPARIN SODIUM 5000 UNITS: 10000 INJECTION INTRAVENOUS; SUBCUTANEOUS at 16:00

## 2024-03-20 RX ADMIN — HEPARIN SODIUM 5000 UNITS: 10000 INJECTION INTRAVENOUS; SUBCUTANEOUS at 00:21

## 2024-03-20 RX ADMIN — METHIMAZOLE 10 MG: 5 TABLET ORAL at 09:21

## 2024-03-20 RX ADMIN — METOPROLOL SUCCINATE 12.5 MG: 25 TABLET, EXTENDED RELEASE ORAL at 16:00

## 2024-03-20 RX ADMIN — SODIUM BICARBONATE 650 MG: 650 TABLET ORAL at 09:21

## 2024-03-20 RX ADMIN — LORAZEPAM 2 MG: 2 INJECTION INTRAMUSCULAR; INTRAVENOUS at 20:14

## 2024-03-20 RX ADMIN — Medication 400 MG: at 09:21

## 2024-03-20 RX ADMIN — Medication 1 MG: at 09:21

## 2024-03-20 RX ADMIN — Medication: at 20:19

## 2024-03-20 RX ADMIN — LORAZEPAM 1 MG: 2 INJECTION INTRAMUSCULAR; INTRAVENOUS at 12:54

## 2024-03-20 RX ADMIN — SODIUM BICARBONATE 650 MG: 650 TABLET ORAL at 20:19

## 2024-03-20 RX ADMIN — SODIUM BICARBONATE 650 MG: 650 TABLET ORAL at 14:18

## 2024-03-20 RX ADMIN — MULTIVITAMIN TABLET 1 TABLET: TABLET at 09:21

## 2024-03-20 RX ADMIN — Medication 100 MG: at 09:21

## 2024-03-20 ASSESSMENT — PAIN SCALES - GENERAL: PAINLEVEL_OUTOF10: 0

## 2024-03-20 NOTE — PROGRESS NOTES
Attempted to call Nurse to Nurse to [facility].   Spoke to  Juli who attempted to transfer call to unit 3x with no answer on unit. Juli asked for this RN to call back later to try to give report.     This RN will attempt to call Nurse to Nurse at a later time prior to patients discharge to facility.    Electronically signed by Zakia Holt RN on 3/20/24 at 3:45 PM EDT

## 2024-03-20 NOTE — PLAN OF CARE
Problem: Safety - Adult  Goal: Free from fall injury  3/20/2024 1047 by Zakia Holt RN  Outcome: Progressing  3/19/2024 2224 by Mckenna Weaver RN  Outcome: Progressing     Problem: Discharge Planning  Goal: Discharge to home or other facility with appropriate resources  3/20/2024 1047 by Zakia Holt RN  Outcome: Progressing  3/19/2024 2224 by Mckenna Weaver RN  Outcome: Progressing  Flowsheets (Taken 3/19/2024 2000)  Discharge to home or other facility with appropriate resources:   Identify barriers to discharge with patient and caregiver   Identify discharge learning needs (meds, wound care, etc)     Problem: Pain  Goal: Verbalizes/displays adequate comfort level or baseline comfort level  3/20/2024 1047 by Zakia Holt RN  Outcome: Progressing  3/19/2024 2224 by Mckenna Weaver RN  Outcome: Progressing     Problem: Skin/Tissue Integrity  Goal: Absence of new skin breakdown  Description: 1.  Monitor for areas of redness and/or skin breakdown  2.  Assess vascular access sites hourly  3.  Every 4-6 hours minimum:  Change oxygen saturation probe site  4.  Every 4-6 hours:  If on nasal continuous positive airway pressure, respiratory therapy assess nares and determine need for appliance change or resting period.  3/20/2024 1047 by Zakia Holt RN  Outcome: Progressing  3/19/2024 2224 by Mckenna Weaver RN  Outcome: Progressing     Problem: Cardiovascular - Adult  Goal: Absence of cardiac dysrhythmias or at baseline  3/20/2024 1047 by Zakia Holt RN  Outcome: Progressing  3/19/2024 2224 by Mckenna Weaver RN  Outcome: Progressing  Flowsheets (Taken 3/19/2024 2000)  Absence of cardiac dysrhythmias or at baseline: Monitor cardiac rate and rhythm     Problem: Infection - Adult  Goal: Absence of infection at discharge  3/20/2024 1047 by Zakia Holt RN  Outcome: Progressing  3/19/2024 2224 by Mckenna Weaver RN  Outcome: Progressing  Flowsheets (Taken 3/19/2024 2000)  Absence of

## 2024-03-20 NOTE — PLAN OF CARE
Problem: Safety - Adult  Goal: Free from fall injury  3/20/2024 1537 by Zakia Holt RN  Outcome: Completed  3/20/2024 1047 by Zakia Holt RN  Outcome: Progressing     Problem: Discharge Planning  Goal: Discharge to home or other facility with appropriate resources  3/20/2024 1537 by Zakia Holt RN  Outcome: Completed  3/20/2024 1047 by Zakia Holt RN  Outcome: Progressing  Flowsheets (Taken 3/20/2024 0830)  Discharge to home or other facility with appropriate resources: Identify barriers to discharge with patient and caregiver     Problem: Pain  Goal: Verbalizes/displays adequate comfort level or baseline comfort level  3/20/2024 1537 by Zakia Holt RN  Outcome: Completed  3/20/2024 1047 by Zakia Holt RN  Outcome: Progressing     Problem: Skin/Tissue Integrity  Goal: Absence of new skin breakdown  Description: 1.  Monitor for areas of redness and/or skin breakdown  2.  Assess vascular access sites hourly  3.  Every 4-6 hours minimum:  Change oxygen saturation probe site  4.  Every 4-6 hours:  If on nasal continuous positive airway pressure, respiratory therapy assess nares and determine need for appliance change or resting period.  3/20/2024 1537 by Zakia Holt RN  Outcome: Completed  3/20/2024 1047 by Zakia Holt RN  Outcome: Progressing     Problem: Cardiovascular - Adult  Goal: Absence of cardiac dysrhythmias or at baseline  3/20/2024 1537 by Zakia Holt RN  Outcome: Completed  3/20/2024 1047 by Zakia Holt RN  Outcome: Progressing  Flowsheets (Taken 3/20/2024 0830)  Absence of cardiac dysrhythmias or at baseline: Monitor cardiac rate and rhythm     Problem: Infection - Adult  Goal: Absence of infection at discharge  3/20/2024 1537 by Zakia Holt RN  Outcome: Completed  3/20/2024 1047 by Zakia Holt RN  Outcome: Progressing  Flowsheets (Taken 3/20/2024 0830)  Absence of infection at discharge: Assess and monitor for signs and symptoms of  infection  Goal: Absence of infection during hospitalization  3/20/2024 1537 by Zakia Holt RN  Outcome: Completed  3/20/2024 1047 by Zakia Holt RN  Outcome: Progressing  Flowsheets (Taken 3/20/2024 0830)  Absence of infection during hospitalization: Assess and monitor for signs and symptoms of infection     Problem: Metabolic/Fluid and Electrolytes - Adult  Goal: Electrolytes maintained within normal limits  3/20/2024 1537 by Zakia Holt RN  Outcome: Completed  3/20/2024 1047 by Zakia Holt RN  Outcome: Progressing  Flowsheets (Taken 3/20/2024 0830)  Electrolytes maintained within normal limits: Monitor labs and assess patient for signs and symptoms of electrolyte imbalances     Problem: Hematologic - Adult  Goal: Maintains hematologic stability  3/20/2024 1537 by Zakia Holt RN  Outcome: Completed  3/20/2024 1047 by Zakia Holt RN  Outcome: Progressing  Flowsheets (Taken 3/20/2024 0830)  Maintains hematologic stability: Assess for signs and symptoms of bleeding or hemorrhage     Problem: Neurosensory - Adult  Goal: Achieves stable or improved neurological status  3/20/2024 1537 by Zakia Holt RN  Outcome: Completed  3/20/2024 1047 by Zakia Holt RN  Outcome: Progressing  Flowsheets (Taken 3/20/2024 0830)  Achieves stable or improved neurological status: Assess for and report changes in neurological status

## 2024-03-20 NOTE — TELEPHONE ENCOUNTER
Richie will contact office to reschedule 6 month follow up for patient once she is out of Hospital and settled into [facility], will also contact office if any new concerns or issues.

## 2024-03-20 NOTE — PROGRESS NOTES
Nurse to nurse called to Sonja at [facility]    Electronically signed by Zaika Holt RN on 3/20/24 at 4:44 PM EDT

## 2024-03-20 NOTE — PROGRESS NOTES
ENDOCRINOLOGY PROGRESS NOTE    Date of Admission: 3/13/2024  Date of Service: 3/19/2024  Admitting Physician: Nash Copeland DO   Primary Care Physician: William Olea MD  Consultant physician: Sandeep Hughes MD     Reason for the consultation:  Hyperthyroidism    History of Present Illness:  The history is provided by the patient. Accuracy of the patient data is excellent.    Abbie Amaya is a very pleasant 63 y.o. old female with PMH of ETOH abuse, cervical dysplasia, and hypertension and other listed below admitted to [facility] on 3/13/2024 because of a GI bleed and low BP, endocrine service was consulted for evaluation for evaluation and management of hyperthyroidism    Subjective  The patient was seen and examined today, no acute events overnight, free T4 is still high    Scheduled Meds:   miconazole nitrate   Topical BID    sodium chloride flush  5-40 mL IntraVENous 2 times per day    thiamine  100 mg Oral Daily    magnesium oxide  400 mg Oral Daily    methIMAzole  5 mg Oral TID    sodium bicarbonate  650 mg Oral TID    multivitamin  1 tablet Oral Daily    ammonium lactate   Topical Daily    folic acid  1 mg Oral Daily    [Held by provider] metoprolol tartrate  25 mg Oral BID    sodium chloride flush  5-40 mL IntraVENous 2 times per day    heparin (porcine)  5,000 Units SubCUTAneous Q8H     PRN Meds:   miconazole nitrate, , TID PRN  sodium chloride flush, 5-40 mL, PRN  sodium chloride, , PRN  LORazepam, 1 mg, Q1H PRN   Or  LORazepam, 1 mg, Q1H PRN   Or  LORazepam, 2 mg, Q1H PRN   Or  LORazepam, 2 mg, Q1H PRN   Or  LORazepam, 3 mg, Q1H PRN   Or  LORazepam, 3 mg, Q1H PRN   Or  LORazepam, 4 mg, Q1H PRN   Or  LORazepam, 4 mg, Q1H PRN  melatonin, 5 mg, Nightly PRN  sodium chloride flush, 5-40 mL, PRN  ondansetron, 4 mg, Q8H PRN   Or  ondansetron, 4 mg, Q6H PRN  acetaminophen, 650 mg, Q6H PRN   Or  acetaminophen, 650 mg, Q6H PRN  mineral oil-hydrophilic petrolatum, , BID PRN  medicated lip balm, , PRN  sodium  records   All labs and imaging were reviewed independently    ASSESSMENT & RECOMMENDATIONS   Abbie Amaya, a 63 y.o.-old female seen in for evaluation and management of hypothyroidism    Hyperthyroidism  Repeated thyroid function test on March 16, 2024 showed a TSH of 0.06 and a free T4 of 1.8  Repeated labs this morning high free T4 without improvement  Will increase methimazole to 10 mg twice daily  No clear signs of Graves' disease on physical examination.    TSI negative, TPO antibody, and antithyroglobulin body are pending    Interdisciplinary plan for communication with healthcare providers:   Consult recommendations were discussed with the Primary Service/Nursing staff      The above issues were reviewed with the patient who understood and agreed with the plan.  Thank you for allowing us to participate in the care of this patient. Please do not hesitate to contact us with any additional questions.     Sandeep Hughes MD  Endocrinologist, [facility]   [address] [place] [id number]   Phone: [phone]  Fax: [phone]  ---------------------------------  An electronic signature was used to authenticate this note. Sandeep Hughes MD on 3/19/2024 at 8:20 PM

## 2024-03-20 NOTE — PLAN OF CARE
Problem: Safety - Adult  Goal: Free from fall injury  Outcome: Progressing     Problem: Discharge Planning  Goal: Discharge to home or other facility with appropriate resources  Outcome: Progressing  Flowsheets (Taken 3/19/2024 2000)  Discharge to home or other facility with appropriate resources:   Identify barriers to discharge with patient and caregiver   Identify discharge learning needs (meds, wound care, etc)     Problem: Pain  Goal: Verbalizes/displays adequate comfort level or baseline comfort level  Outcome: Progressing     Problem: Skin/Tissue Integrity  Goal: Absence of new skin breakdown  Description: 1.  Monitor for areas of redness and/or skin breakdown  2.  Assess vascular access sites hourly  3.  Every 4-6 hours minimum:  Change oxygen saturation probe site  4.  Every 4-6 hours:  If on nasal continuous positive airway pressure, respiratory therapy assess nares and determine need for appliance change or resting period.  Outcome: Progressing     Problem: Cardiovascular - Adult  Goal: Absence of cardiac dysrhythmias or at baseline  Outcome: Progressing  Flowsheets (Taken 3/19/2024 2000)  Absence of cardiac dysrhythmias or at baseline: Monitor cardiac rate and rhythm     Problem: Infection - Adult  Goal: Absence of infection at discharge  Outcome: Progressing  Flowsheets (Taken 3/19/2024 2000)  Absence of infection at discharge: Assess and monitor for signs and symptoms of infection  Goal: Absence of infection during hospitalization  Outcome: Progressing  Flowsheets (Taken 3/19/2024 2000)  Absence of infection during hospitalization: Assess and monitor for signs and symptoms of infection     Problem: Metabolic/Fluid and Electrolytes - Adult  Goal: Electrolytes maintained within normal limits  Outcome: Progressing  Flowsheets (Taken 3/19/2024 2000)  Electrolytes maintained within normal limits: Monitor labs and assess patient for signs and symptoms of electrolyte imbalances     Problem: Hematologic -

## 2024-03-20 NOTE — CARE COORDINATION
CM Update: Met with patient at bedside to discuss transition of care plan. Patient confused. Discharge plan is [facility]. Destination and [facility] updated. Face Sheet, Ambulette Form, and Envelope in soft chart. Patient is a long term bed hold and can return when medically stable. Patient came to hospital with rectal bleeding. Found to have Hyponatremia, Na 118 in ED, JARRELL and Cr. Was 2.6 and an external hemorrhoid. Na 131 today. CM/SW to follow. MT      1500-Discharge Order Noted. Transport set with [facility] for 1800. Nursing Notified, Family Notified, Facility Notified. CM/SW to follow. MT

## 2024-03-20 NOTE — PROGRESS NOTES
Hospitalist Progress Note      PCP: William Olea MD    Date of Admission: 3/13/2024        Hospital Course:  63 y.o. female presented with HYPERNATREMIA,  SHE IS A POOR HISTORIAN, AND DOES NOT KNOW WHY SHE IS HERE. SHE LIVES ALONE AND APPARENTLY WAS FOUND ON THE FLOOD.. ? ETOH ABUSE     3/17 HYPERTHYROID, STARTED ON TAPAZOL     3/20  WILL START ON LOW DOSE TOPROL    Subjective:  PLEASANTLY CONFUSED           Medications:  Reviewed    Infusion Medications    sodium chloride      sodium chloride       Scheduled Medications    magnesium sulfate  4,000 mg IntraVENous Once    metoprolol succinate  12.5 mg Oral Daily    miconazole nitrate   Topical BID    methIMAzole  10 mg Oral BID    sodium chloride flush  5-40 mL IntraVENous 2 times per day    thiamine  100 mg Oral Daily    sodium bicarbonate  650 mg Oral TID    multivitamin  1 tablet Oral Daily    ammonium lactate   Topical Daily    folic acid  1 mg Oral Daily    sodium chloride flush  5-40 mL IntraVENous 2 times per day    heparin (porcine)  5,000 Units SubCUTAneous Q8H     PRN Meds: miconazole nitrate **AND** miconazole nitrate, sodium chloride flush, sodium chloride, LORazepam **OR** LORazepam **OR** LORazepam **OR** LORazepam **OR** LORazepam **OR** LORazepam **OR** LORazepam **OR** LORazepam, melatonin, sodium chloride flush, ondansetron **OR** ondansetron, acetaminophen **OR** acetaminophen, mineral oil-hydrophilic petrolatum, medicated lip balm, sodium chloride      Intake/Output Summary (Last 24 hours) at 3/20/2024 1508  Last data filed at 3/20/2024 0809  Gross per 24 hour   Intake 240 ml   Output 0 ml   Net 240 ml       Exam:    BP (!) 139/103   Pulse (!) 123   Temp 96.8 °F (36 °C) (Infrared)   Resp 18   Ht 1.702 m (5' 7\")   Wt 56.2 kg (123 lb 14.4 oz)   SpO2 95%   BMI 19.41 kg/m²       General appearance:  No apparent distress,   HEENT:  Normal cephalic,   Neck: Supple, with full range of motion.  Trachea midline.  Respiratory:  Normal

## 2024-03-21 LAB
THYROGLOBULIN AB: <12 IU/ML (ref 0–40)
THYROPEROXIDASE AB SERPL IA-ACNC: <4 IU/ML (ref 0–25)

## 2024-03-21 NOTE — DISCHARGE SUMMARY
Nash Copeland, DO   sodium bicarbonate 650 MG tablet Take 1 tablet by mouth 3 times daily 3/20/24  Yes Nash Copeland, DO   metoprolol succinate (TOPROL XL) 25 MG extended release tablet Take 0.5 tablets by mouth daily 3/20/24  Yes Nash Copeland, DO   sertraline (ZOLOFT) 25 MG tablet Take 1 tablet by mouth daily   Yes Provider, MD Jeremiah   folic acid (FOLVITE) 1 MG tablet Take 1 tablet by mouth daily 2/21/24   Ana Cristina Herron APRN - CNP   thiamine 100 MG tablet Take 1 tablet by mouth daily 2/21/24   Ana Cristina Herron APRN - CNP       Consults:   IP CONSULT TO SOCIAL WORK  IP CONSULT TO INTERNAL MEDICINE  IP CONSULT TO CRITICAL CARE  IP CONSULT TO SOCIAL WORK  IP CONSULT TO INFECTIOUS DISEASES  PHARMACY TO DOSE VANCOMYCIN  IP CONSULT TO NEPHROLOGY  IP CONSULT TO NEPHROLOGY  IP CONSULT TO ENDOCRINOLOGY  IP CONSULT TO SOCIAL WORK            Discharge Instructions / Follow up:    Future Appointments   Date Time Provider Department Center   4/17/2024 11:20 AM Diomedes Álvarez APRN - [facility]       Continued appropriate risk factor modification of blood pressure, diabetes and serum lipids will remain essential to reducing risk of future atherosclerotic development    Activity: activity as tolerated    Significant labs:  CBC:   Recent Labs     03/19/24  0845 03/20/24  0441   WBC 5.6 5.4   RBC 3.93 3.68   HGB 13.4 12.8   HCT 38.8 35.5   MCV 98.7 96.5   RDW 12.2 12.1    258     BMP:   Recent Labs     03/19/24  0505 03/19/24  0845 03/20/24  0441   NA  --  131* 131*   K  --  5.0 4.4   CL  --  96* 95*   CO2  --  22 21*   BUN  --  11 9   CREATININE  --  0.6 0.5   MG 1.6  --  1.3*   PHOS 3.4  --  3.2     LFT:  Recent Labs     03/19/24  0845 03/20/24  0441   PROT 6.6 6.6   ALKPHOS 99 102   ALT 12 12   AST 18 20   BILITOT 1.1 1.3*     PT/INR: No results for input(s): \"INR\", \"APTT\" in the last 72 hours.  BNP: No results for input(s): \"BNP\" in the last 72 hours.  Hgb A1C:   Lab Results  Every effort was made to ensure accuracy; however, inadvertent computerized transcription errors may be present.

## 2024-03-21 NOTE — PROGRESS NOTES
ENDOCRINOLOGY PROGRESS NOTE    Date of Admission: 3/13/2024  Date of Service: 3/20/2024  Admitting Physician: Nash Copeland DO   Primary Care Physician: William Olea MD  Consultant physician: Sandeep Hughes MD     Reason for the consultation:  Hyperthyroidism    History of Present Illness:  The history is provided by the patient. Accuracy of the patient data is excellent.    Abbie Amaya is a very pleasant 63 y.o. old female with PMH of ETOH abuse, cervical dysplasia, and hypertension and other listed below admitted to [facility] on 3/13/2024 because of a GI bleed and low BP, endocrine service was consulted for evaluation for evaluation and management of hyperthyroidism    Subjective  I saw and examined the patient at bedside this afternoon.  No acute events overnight.    Scheduled Meds:   metoprolol succinate  12.5 mg Oral Daily    miconazole nitrate   Topical BID    methIMAzole  10 mg Oral BID    sodium chloride flush  5-40 mL IntraVENous 2 times per day    thiamine  100 mg Oral Daily    sodium bicarbonate  650 mg Oral TID    multivitamin  1 tablet Oral Daily    ammonium lactate   Topical Daily    folic acid  1 mg Oral Daily    sodium chloride flush  5-40 mL IntraVENous 2 times per day    heparin (porcine)  5,000 Units SubCUTAneous Q8H     PRN Meds:   miconazole nitrate, , TID PRN  sodium chloride flush, 5-40 mL, PRN  sodium chloride, , PRN  LORazepam, 1 mg, Q1H PRN   Or  LORazepam, 1 mg, Q1H PRN   Or  LORazepam, 2 mg, Q1H PRN   Or  LORazepam, 2 mg, Q1H PRN   Or  LORazepam, 3 mg, Q1H PRN   Or  LORazepam, 3 mg, Q1H PRN   Or  LORazepam, 4 mg, Q1H PRN   Or  LORazepam, 4 mg, Q1H PRN  melatonin, 5 mg, Nightly PRN  sodium chloride flush, 5-40 mL, PRN  ondansetron, 4 mg, Q8H PRN   Or  ondansetron, 4 mg, Q6H PRN  acetaminophen, 650 mg, Q6H PRN   Or  acetaminophen, 650 mg, Q6H PRN  mineral oil-hydrophilic petrolatum, , BID PRN  medicated lip balm, , PRN  sodium chloride, , PRN      Continuous Infusions:   sodium

## 2024-05-06 LAB
25(OH)D3 SERPL-MCNC: 18.5 NG/ML (ref 30–100)
ALBUMIN SERPL-MCNC: 4.2 G/DL (ref 3.5–5.2)
ALP SERPL-CCNC: 132 U/L (ref 35–104)
ALT SERPL-CCNC: 9 U/L (ref 0–32)
ANION GAP SERPL CALCULATED.3IONS-SCNC: 13 MMOL/L (ref 7–16)
AST SERPL-CCNC: 13 U/L (ref 0–31)
BASOPHILS # BLD: 0.03 K/UL (ref 0–0.2)
BASOPHILS NFR BLD: 1 % (ref 0–2)
BILIRUB SERPL-MCNC: 1.1 MG/DL (ref 0–1.2)
BUN SERPL-MCNC: 9 MG/DL (ref 6–23)
CALCIUM SERPL-MCNC: 10.4 MG/DL (ref 8.6–10.2)
CHLORIDE SERPL-SCNC: 102 MMOL/L (ref 98–107)
CHOLEST SERPL-MCNC: 249 MG/DL
CO2 SERPL-SCNC: 24 MMOL/L (ref 22–29)
CREAT SERPL-MCNC: 0.6 MG/DL (ref 0.5–1)
EOSINOPHIL # BLD: 0.12 K/UL (ref 0.05–0.5)
EOSINOPHILS RELATIVE PERCENT: 2 % (ref 0–6)
ERYTHROCYTE [DISTWIDTH] IN BLOOD BY AUTOMATED COUNT: 11.9 % (ref 11.5–15)
GFR, ESTIMATED: >90 ML/MIN/1.73M2
GLUCOSE SERPL-MCNC: 98 MG/DL (ref 74–99)
HCT VFR BLD AUTO: 38.3 % (ref 34–48)
HDLC SERPL-MCNC: 49 MG/DL
HGB BLD-MCNC: 13 G/DL (ref 11.5–15.5)
IMM GRANULOCYTES # BLD AUTO: <0.03 K/UL (ref 0–0.58)
IMM GRANULOCYTES NFR BLD: 0 % (ref 0–5)
LDLC SERPL CALC-MCNC: 179 MG/DL
LYMPHOCYTES NFR BLD: 1.16 K/UL (ref 1.5–4)
LYMPHOCYTES RELATIVE PERCENT: 22 % (ref 20–42)
MCH RBC QN AUTO: 32.9 PG (ref 26–35)
MCHC RBC AUTO-ENTMCNC: 33.9 G/DL (ref 32–34.5)
MCV RBC AUTO: 97 FL (ref 80–99.9)
MONOCYTES NFR BLD: 0.4 K/UL (ref 0.1–0.95)
MONOCYTES NFR BLD: 7 % (ref 2–12)
NEUTROPHILS NFR BLD: 68 % (ref 43–80)
NEUTS SEG NFR BLD: 3.66 K/UL (ref 1.8–7.3)
PLATELET # BLD AUTO: 370 K/UL (ref 130–450)
PMV BLD AUTO: 9.6 FL (ref 7–12)
POTASSIUM SERPL-SCNC: 5.2 MMOL/L (ref 3.5–5)
PROT SERPL-MCNC: 6.8 G/DL (ref 6.4–8.3)
RBC # BLD AUTO: 3.95 M/UL (ref 3.5–5.5)
SODIUM SERPL-SCNC: 139 MMOL/L (ref 132–146)
TRIGL SERPL-MCNC: 104 MG/DL
TSH SERPL DL<=0.05 MIU/L-ACNC: 1.59 UIU/ML (ref 0.27–4.2)
VIT B12 SERPL-MCNC: 185 PG/ML (ref 211–946)
VLDLC SERPL CALC-MCNC: 21 MG/DL
WBC OTHER # BLD: 5.4 K/UL (ref 4.5–11.5)

## 2024-05-14 LAB — VIT B1 PYROPHOSHATE BLD-SCNC: 139 NMOL/L (ref 70–180)

## 2024-08-08 LAB
25(OH)D3 SERPL-MCNC: 47.8 NG/ML (ref 30–100)
VIT B12 SERPL-MCNC: 513 PG/ML (ref 211–946)

## 2025-08-15 ENCOUNTER — TELEPHONE (OUTPATIENT)
Dept: AUDIOLOGY | Age: 65
End: 2025-08-15